# Patient Record
Sex: MALE | Employment: OTHER | URBAN - METROPOLITAN AREA
[De-identification: names, ages, dates, MRNs, and addresses within clinical notes are randomized per-mention and may not be internally consistent; named-entity substitution may affect disease eponyms.]

---

## 2021-07-30 ENCOUNTER — APPOINTMENT (OUTPATIENT)
Dept: GENERAL RADIOLOGY | Age: 61
DRG: 640 | End: 2021-07-30
Payer: COMMERCIAL

## 2021-07-30 ENCOUNTER — HOSPITAL ENCOUNTER (INPATIENT)
Age: 61
LOS: 2 days | Discharge: HOME | DRG: 640 | End: 2021-08-01
Attending: STUDENT IN AN ORGANIZED HEALTH CARE EDUCATION/TRAINING PROGRAM
Payer: COMMERCIAL

## 2021-07-30 DIAGNOSIS — E87.20 METABOLIC ACIDOSIS: ICD-10-CM

## 2021-07-30 DIAGNOSIS — Z99.2 ESRD NEEDING DIALYSIS (HCC): ICD-10-CM

## 2021-07-30 DIAGNOSIS — N18.6 ESRD NEEDING DIALYSIS (HCC): ICD-10-CM

## 2021-07-30 DIAGNOSIS — R06.00 DYSPNEA, UNSPECIFIED TYPE: ICD-10-CM

## 2021-07-30 DIAGNOSIS — E87.5 HYPERKALEMIA: Primary | ICD-10-CM

## 2021-07-30 LAB
ANION GAP SERPL CALCULATED.3IONS-SCNC: 32 MMOL/L (ref 3–16)
BASE EXCESS VENOUS: -20.1 MMOL/L (ref -2–3)
BASOPHILS ABSOLUTE: 0 K/UL (ref 0–0.2)
BASOPHILS RELATIVE PERCENT: 0.4 %
BUN BLDV-MCNC: 236 MG/DL (ref 7–20)
CALCIUM SERPL-MCNC: 7.6 MG/DL (ref 8.3–10.6)
CARBOXYHEMOGLOBIN: 1.3 % (ref 0–1.5)
CHLORIDE BLD-SCNC: 98 MMOL/L (ref 99–110)
CO2: 8 MMOL/L (ref 21–32)
CREAT SERPL-MCNC: 30.8 MG/DL (ref 0.9–1.3)
EKG ATRIAL RATE: 94 BPM
EKG DIAGNOSIS: NORMAL
EKG P AXIS: 73 DEGREES
EKG P-R INTERVAL: 182 MS
EKG Q-T INTERVAL: 414 MS
EKG QRS DURATION: 128 MS
EKG QTC CALCULATION (BAZETT): 517 MS
EKG R AXIS: 19 DEGREES
EKG T AXIS: 80 DEGREES
EKG VENTRICULAR RATE: 94 BPM
EOSINOPHILS ABSOLUTE: 0.1 K/UL (ref 0–0.6)
EOSINOPHILS RELATIVE PERCENT: 0.6 %
GFR AFRICAN AMERICAN: 2
GFR NON-AFRICAN AMERICAN: 1
GLUCOSE BLD-MCNC: 101 MG/DL (ref 70–99)
GLUCOSE BLD-MCNC: 219 MG/DL (ref 70–99)
HCO3 VENOUS: 7.7 MMOL/L (ref 24–28)
HCT VFR BLD CALC: 25.9 % (ref 40.5–52.5)
HEMOGLOBIN, VEN, REDUCED: 12.9 %
HEMOGLOBIN: 8.8 G/DL (ref 13.5–17.5)
LYMPHOCYTES ABSOLUTE: 0.5 K/UL (ref 1–5.1)
LYMPHOCYTES RELATIVE PERCENT: 5.1 %
MCH RBC QN AUTO: 31.9 PG (ref 26–34)
MCHC RBC AUTO-ENTMCNC: 33.9 G/DL (ref 31–36)
MCV RBC AUTO: 93.9 FL (ref 80–100)
METHEMOGLOBIN VENOUS: 0.5 % (ref 0–1.5)
MONOCYTES ABSOLUTE: 0.5 K/UL (ref 0–1.3)
MONOCYTES RELATIVE PERCENT: 5.2 %
NEUTROPHILS ABSOLUTE: 9.4 K/UL (ref 1.7–7.7)
NEUTROPHILS RELATIVE PERCENT: 88.7 %
O2 SAT, VEN: 87 %
PCO2, VEN: 23.8 MMHG (ref 41–51)
PDW BLD-RTO: 15.2 % (ref 12.4–15.4)
PERFORMED ON: ABNORMAL
PH VENOUS: 7.12 (ref 7.35–7.45)
PHOSPHORUS: 16.2 MG/DL (ref 2.5–4.9)
PLATELET # BLD: 365 K/UL (ref 135–450)
PMV BLD AUTO: 7.8 FL (ref 5–10.5)
PO2, VEN: 66.8 MMHG (ref 25–40)
POTASSIUM REFLEX MAGNESIUM: 7.8 MMOL/L (ref 3.5–5.1)
PRO-BNP: ABNORMAL PG/ML (ref 0–124)
RBC # BLD: 2.76 M/UL (ref 4.2–5.9)
SODIUM BLD-SCNC: 138 MMOL/L (ref 136–145)
TCO2 CALC VENOUS: 8 MMOL/L
TROPONIN: 0.08 NG/ML
WBC # BLD: 10.6 K/UL (ref 4–11)

## 2021-07-30 PROCEDURE — 90935 HEMODIALYSIS ONE EVALUATION: CPT

## 2021-07-30 PROCEDURE — 96365 THER/PROPH/DIAG IV INF INIT: CPT

## 2021-07-30 PROCEDURE — 96376 TX/PRO/DX INJ SAME DRUG ADON: CPT

## 2021-07-30 PROCEDURE — 2500000003 HC RX 250 WO HCPCS: Performed by: INTERNAL MEDICINE

## 2021-07-30 PROCEDURE — 93005 ELECTROCARDIOGRAM TRACING: CPT | Performed by: STUDENT IN AN ORGANIZED HEALTH CARE EDUCATION/TRAINING PROGRAM

## 2021-07-30 PROCEDURE — 6370000000 HC RX 637 (ALT 250 FOR IP): Performed by: STUDENT IN AN ORGANIZED HEALTH CARE EDUCATION/TRAINING PROGRAM

## 2021-07-30 PROCEDURE — 83880 ASSAY OF NATRIURETIC PEPTIDE: CPT

## 2021-07-30 PROCEDURE — 71045 X-RAY EXAM CHEST 1 VIEW: CPT

## 2021-07-30 PROCEDURE — 2580000003 HC RX 258: Performed by: STUDENT IN AN ORGANIZED HEALTH CARE EDUCATION/TRAINING PROGRAM

## 2021-07-30 PROCEDURE — 99283 EMERGENCY DEPT VISIT LOW MDM: CPT

## 2021-07-30 PROCEDURE — 96375 TX/PRO/DX INJ NEW DRUG ADDON: CPT

## 2021-07-30 PROCEDURE — 84100 ASSAY OF PHOSPHORUS: CPT

## 2021-07-30 PROCEDURE — 6360000002 HC RX W HCPCS: Performed by: STUDENT IN AN ORGANIZED HEALTH CARE EDUCATION/TRAINING PROGRAM

## 2021-07-30 PROCEDURE — 2060000000 HC ICU INTERMEDIATE R&B

## 2021-07-30 PROCEDURE — 84484 ASSAY OF TROPONIN QUANT: CPT

## 2021-07-30 PROCEDURE — 2500000003 HC RX 250 WO HCPCS: Performed by: STUDENT IN AN ORGANIZED HEALTH CARE EDUCATION/TRAINING PROGRAM

## 2021-07-30 PROCEDURE — 5A1D70Z PERFORMANCE OF URINARY FILTRATION, INTERMITTENT, LESS THAN 6 HOURS PER DAY: ICD-10-PCS | Performed by: INTERNAL MEDICINE

## 2021-07-30 PROCEDURE — 85025 COMPLETE CBC W/AUTO DIFF WBC: CPT

## 2021-07-30 PROCEDURE — 82803 BLOOD GASES ANY COMBINATION: CPT

## 2021-07-30 PROCEDURE — 36415 COLL VENOUS BLD VENIPUNCTURE: CPT

## 2021-07-30 PROCEDURE — 80048 BASIC METABOLIC PNL TOTAL CA: CPT

## 2021-07-30 RX ORDER — LABETALOL HYDROCHLORIDE 5 MG/ML
5 INJECTION, SOLUTION INTRAVENOUS EVERY 4 HOURS PRN
Status: DISCONTINUED | OUTPATIENT
Start: 2021-07-30 | End: 2021-08-01 | Stop reason: HOSPADM

## 2021-07-30 RX ORDER — DEXTROSE MONOHYDRATE 25 G/50ML
25 INJECTION, SOLUTION INTRAVENOUS PRN
Status: DISCONTINUED | OUTPATIENT
Start: 2021-07-30 | End: 2021-08-01 | Stop reason: HOSPADM

## 2021-07-30 RX ORDER — CALCIUM GLUCONATE 94 MG/ML
1000 INJECTION, SOLUTION INTRAVENOUS ONCE
Status: COMPLETED | OUTPATIENT
Start: 2021-07-30 | End: 2021-07-30

## 2021-07-30 RX ORDER — CALCIUM ACETATE 667 MG/1
1334 CAPSULE ORAL
Status: DISCONTINUED | OUTPATIENT
Start: 2021-07-30 | End: 2021-07-31

## 2021-07-30 RX ADMIN — SODIUM BICARBONATE 50 MEQ: 84 INJECTION, SOLUTION INTRAVENOUS at 15:48

## 2021-07-30 RX ADMIN — INSULIN HUMAN 5 UNITS: 100 INJECTION, SOLUTION PARENTERAL at 15:46

## 2021-07-30 RX ADMIN — LABETALOL HYDROCHLORIDE 5 MG: 5 INJECTION, SOLUTION INTRAVENOUS at 22:04

## 2021-07-30 RX ADMIN — CALCIUM GLUCONATE 1000 MG: 98 INJECTION, SOLUTION INTRAVENOUS at 14:43

## 2021-07-30 RX ADMIN — CALCIUM GLUCONATE 1000 MG: 98 INJECTION, SOLUTION INTRAVENOUS at 15:59

## 2021-07-30 ASSESSMENT — ENCOUNTER SYMPTOMS
VOMITING: 0
SHORTNESS OF BREATH: 1
BACK PAIN: 0
NAUSEA: 0
ABDOMINAL PAIN: 0
COUGH: 0
EYE DISCHARGE: 0
EYE PAIN: 0
EYE REDNESS: 0
DIARRHEA: 0

## 2021-07-30 ASSESSMENT — PAIN SCALES - GENERAL
PAINLEVEL_OUTOF10: 10
PAINLEVEL_OUTOF10: 0
PAINLEVEL_OUTOF10: 0

## 2021-07-30 ASSESSMENT — PAIN DESCRIPTION - PAIN TYPE: TYPE: ACUTE PAIN

## 2021-07-30 ASSESSMENT — PAIN DESCRIPTION - LOCATION: LOCATION: EYE

## 2021-07-30 NOTE — H&P
Hospital Medicine History & Physical      PCP: No primary care provider on file. Date of Admission: 7/30/2021    Date of Service: 7/30/2021    Pt seen/examined on 7/30/2021    Admitted to Inpatient with expected LOS greater than two midnights due to medical therapy. Chief Complaint:     Chief Complaint   Patient presents with    Shortness of Breath     had eye procedure last week, has not been able to drive and therefore has not been to HD in two weeks    Eye Pain       History Of Present Illness:      64 y.o. male who presented to [facility]. with SOB, began 2 weeks ago when he missed HD, progressively worse as he has missed more HD, persisted until presentation. He had an eye procedure done 2 weeks ago and has bee unable to drive and therefore has not been to HD since that time. Past Medical History:      Reviewed and non-contributory except as noted below      Diagnosis Date    Chronic kidney disease     Hypertension        Past Surgical History:      Reviewed and non-contributory except as noted below  No past surgical history on file. Medications Prior to Admission:      Reviewed and non-contributory except as noted below  Prior to Admission medications    Not on File       Allergies:     Reviewed and non-contributory except as noted below   Patient has no known allergies. Social History:      Reviewed and non-contributory except as noted below    TOBACCO:   reports that he has quit smoking. He has never used smokeless tobacco.  ETOH:   reports previous alcohol use. Family History:      Reviewed and non-contributory except as noted below    No family history on file. REVIEW OF SYSTEMS:   Pertinent positives and negatives as noted in the HPI. All other systems reviewed and negative.     PHYSICAL EXAM PERFORMED:    BP (!) 185/82   Pulse 92   Temp 98.9 °F (37.2 °C) (Oral)   Resp 28   Ht 5' 8\" (1.727 m)   Wt 164 lb (74.4 kg)   SpO2 98%   BMI 24.94 kg/m²     General appearance:  NAD, appears stated age  Eyes: Pupils equal, round, reactive to light, conjunctiva/corneas clear  Ears/Nose/Mouth/Throat: No external lesions or scars, hearing intact to voice  Neck: Trachea midline, no masses noted, no thyromegaly  Respiratory:  Mildly increased work of breathing, bibasilar crackles  Cardiovascular: Regular rate and rhythm, no murmurs, gallops, or rubs  Abdomen: soft, non-tender, non-distended  Musculoskeletal: Warm, 1+ pitting edema in BL lower extremities  Skin: normal color, no wounds noted  Psychiatric: A&Ox4, good insight and judgment    Labs:     Recent Labs     07/30/21  1435   WBC 10.6   HGB 8.8*   HCT 25.9*        Recent Labs     07/30/21  1435      K 7.8*   CL 98*   CO2 8*   *   CREATININE 30.8*   CALCIUM 7.6*     No results for input(s): AST, ALT, BILIDIR, BILITOT, ALKPHOS in the last 72 hours. No results for input(s): INR in the last 72 hours. Recent Labs     07/30/21  1435   TROPONINI 0.08*       Urinalysis:    No results found for: Deward Goon, BACTERIA, RBCUA, BLOODU, SPECGRAV, GLUCOSEU    Radiology:     XR CHEST PORTABLE   Final Result      Multifocal airspace opacities are noted. Recommend dedicated CT chest with intravenous contrast for further assessment. ASSESSMENT:    Active Hospital Problems    Diagnosis Date Noted    Metabolic acidosis [M87.1] 07/30/2021       PLAN:    # Metabolic acidosis  # ESRD on HD  # Hyperkalemia w/ EKG changes  -potentially life threratening  -s/p lokelma, Ca-gluconate, bicarb  -nephrology consulted for urgent HD  -tele    # HTN  -no home medications on file  -anticipate improvement w/ HD  -adjust PO regimen as needed    DVT Prophylaxis: Heparin  Diet: No diet orders on file  Code Status: No Order    PT/OT Eval Status: Ongoing    Dispo: Elizabeth Bergeron pending clinical improvement    Сергей Coffey MD    Thank you No primary care provider on file. for the opportunity to be involved in this patient's care.  If you have any questions or concerns please feel free to contact me at [phone].

## 2021-07-30 NOTE — ED PROVIDER NOTES
ED Attending Attestation Note     Date of evaluation: 7/30/2021    I supervised the care provided by the resident physician, Dr. Thu Nickerson MD. I personally interviewed and examined the patient, and I agree with the workup, evaluation, management and diagnosis. ED data including laboratory and imaging studies were reviewed by me and considered in medical decision making, and I agree with the interpretations as documented, with the following exceptions: None    In brief, Noe Grider is a 63 y.o. male who presents to the ED with Shortness of Breath (had eye procedure last week, has not been able to drive and therefore has not been to HD in two weeks) and Eye Pain  . My assessment reveals ill-appearing 79-year-old male who is tachycardic, tachypneic, confusion is present, is hypertensive. He has missed several weeks of dialysis  I have reviewed the ECG and concur with the resident's interpretation of ECG findings consistent with hyperkalemia.   Treatment given for ardiac membrane potential treatment directed  I was present for the following procedures: None    MD Cheryl Navarro MD  08/03/21 5455

## 2021-07-30 NOTE — ED NOTES
Bed: B24-24  Expected date:   Expected time:   Means of arrival:   Comments:  Jesica Del Real RN  07/30/21 5827

## 2021-07-30 NOTE — CONSULTS
Consult received. Labs and notes were reviewed. Case was discussed with the staff. Full note to follow.   Dialysis arranged for today    Thanks  Nephrology  [facility] [address]  Office: [phone]  Cell: [phone]  Fax: [phone]
Serve or cell number    Floresita Messina MD  [facility] Nephrology  Roderick Professor [address], [address]  Fax: [phone]  Office: [phone]               Reason for Consult and Chief Complaint     Reason for consult: ESRD    Chief complaint:   Chief Complaint   Patient presents with    Shortness of Breath     had eye procedure last week, has not been able to drive and therefore has not been to HD in two weeks    Eye Pain       History of Present Illness   Negrita Ybarra is a 64 y.o. with PMH significant for ESRD on HD,  HTN presents from home via EMS for evaluation of SOB. Pt has not been able to go to dialysis and unable to walk as he is unable to see. Patient also had recent eye laser surgery. Nephrology is consulted for assist in dialysis management. BP is elevated in ER. CXR in ER showed focal opacities in the right middle lower lung zones. There may be focal airspace disease in the left midlung zone as well. K is very high close to 8 and severe AGMA. Patient is uncomfortable and complaining of SOB and he is on oxygen. Patient denies chest pain, GI symptoms. No report of fever. No fall or trauma. Review of Systems     Unable to ask full ROS as patient is in distress. Past Medical History     Past Medical History:   Diagnosis Date    Chronic kidney disease     Hypertension          Past Surgical History     No past surgical history on file. Family History     No family history on file. Social History     Social History     Tobacco Use    Smoking status: Former Smoker    Smokeless tobacco: Never Used   Substance Use Topics    Alcohol use: Not Currently          Past medical, family, and social histories were reviewed as previously documented. Updates were made as necessary.       Inpatient Medications and Allergies       Scheduled Meds:    Allergies: No Known Allergies      Vital Signs     Vitals:    07/30/21 1408   BP: (!) 194/81   Pulse: 95   Resp: 15   Temp: 98.9 °F (37.2 °C)

## 2021-07-31 ENCOUNTER — APPOINTMENT (OUTPATIENT)
Dept: CT IMAGING | Age: 61
DRG: 640 | End: 2021-07-31
Payer: COMMERCIAL

## 2021-07-31 LAB
ALBUMIN SERPL-MCNC: 3.1 G/DL (ref 3.4–5)
ALBUMIN SERPL-MCNC: 3.1 G/DL (ref 3.4–5)
ALBUMIN SERPL-MCNC: 3.2 G/DL (ref 3.4–5)
ANION GAP SERPL CALCULATED.3IONS-SCNC: 18 MMOL/L (ref 3–16)
ANION GAP SERPL CALCULATED.3IONS-SCNC: 23 MMOL/L (ref 3–16)
ANION GAP SERPL CALCULATED.3IONS-SCNC: 25 MMOL/L (ref 3–16)
BASE EXCESS VENOUS: -1.5 MMOL/L (ref -2–3)
BASOPHILS ABSOLUTE: 0 K/UL (ref 0–0.2)
BASOPHILS ABSOLUTE: 0 K/UL (ref 0–0.2)
BASOPHILS RELATIVE PERCENT: 0.1 %
BASOPHILS RELATIVE PERCENT: 0.3 %
BUN BLDV-MCNC: 50 MG/DL (ref 7–20)
BUN BLDV-MCNC: 89 MG/DL (ref 7–20)
BUN BLDV-MCNC: 98 MG/DL (ref 7–20)
CALCIUM SERPL-MCNC: 7.6 MG/DL (ref 8.3–10.6)
CALCIUM SERPL-MCNC: 7.8 MG/DL (ref 8.3–10.6)
CALCIUM SERPL-MCNC: 8 MG/DL (ref 8.3–10.6)
CARBOXYHEMOGLOBIN: 1.4 % (ref 0–1.5)
CHLORIDE BLD-SCNC: 92 MMOL/L (ref 99–110)
CHLORIDE BLD-SCNC: 92 MMOL/L (ref 99–110)
CHLORIDE BLD-SCNC: 96 MMOL/L (ref 99–110)
CO2: 19 MMOL/L (ref 21–32)
CO2: 22 MMOL/L (ref 21–32)
CO2: 25 MMOL/L (ref 21–32)
CREAT SERPL-MCNC: 13.3 MG/DL (ref 0.9–1.3)
CREAT SERPL-MCNC: 14.1 MG/DL (ref 0.9–1.3)
CREAT SERPL-MCNC: 6.6 MG/DL (ref 0.9–1.3)
EKG ATRIAL RATE: 102 BPM
EKG ATRIAL RATE: 197 BPM
EKG DIAGNOSIS: NORMAL
EKG DIAGNOSIS: NORMAL
EKG P AXIS: 76 DEGREES
EKG P-R INTERVAL: 124 MS
EKG Q-T INTERVAL: 292 MS
EKG Q-T INTERVAL: 364 MS
EKG QRS DURATION: 84 MS
EKG QRS DURATION: 84 MS
EKG QTC CALCULATION (BAZETT): 474 MS
EKG QTC CALCULATION (BAZETT): 488 MS
EKG R AXIS: 38 DEGREES
EKG R AXIS: 58 DEGREES
EKG T AXIS: 169 DEGREES
EKG T AXIS: 225 DEGREES
EKG VENTRICULAR RATE: 102 BPM
EKG VENTRICULAR RATE: 168 BPM
EOSINOPHILS ABSOLUTE: 0 K/UL (ref 0–0.6)
EOSINOPHILS ABSOLUTE: 0 K/UL (ref 0–0.6)
EOSINOPHILS RELATIVE PERCENT: 0 %
EOSINOPHILS RELATIVE PERCENT: 0.1 %
FERRITIN: 893.4 NG/ML (ref 30–400)
GFR AFRICAN AMERICAN: 10
GFR AFRICAN AMERICAN: 4
GFR AFRICAN AMERICAN: 5
GFR NON-AFRICAN AMERICAN: 4
GFR NON-AFRICAN AMERICAN: 4
GFR NON-AFRICAN AMERICAN: 9
GLUCOSE BLD-MCNC: 115 MG/DL (ref 70–99)
GLUCOSE BLD-MCNC: 128 MG/DL (ref 70–99)
GLUCOSE BLD-MCNC: 167 MG/DL (ref 70–99)
GLUCOSE BLD-MCNC: 176 MG/DL (ref 70–99)
HCO3 VENOUS: 23.1 MMOL/L (ref 24–28)
HCT VFR BLD CALC: 20.4 % (ref 40.5–52.5)
HCT VFR BLD CALC: 23.4 % (ref 40.5–52.5)
HEMOGLOBIN, VEN, REDUCED: 31.7 %
HEMOGLOBIN: 7.3 G/DL (ref 13.5–17.5)
HEMOGLOBIN: 8.1 G/DL (ref 13.5–17.5)
IRON SATURATION: 56 % (ref 20–50)
IRON: 74 UG/DL (ref 59–158)
LACTIC ACID: 1.3 MMOL/L (ref 0.4–2)
LACTIC ACID: 3.3 MMOL/L (ref 0.4–2)
LYMPHOCYTES ABSOLUTE: 0.5 K/UL (ref 1–5.1)
LYMPHOCYTES ABSOLUTE: 0.5 K/UL (ref 1–5.1)
LYMPHOCYTES RELATIVE PERCENT: 3.5 %
LYMPHOCYTES RELATIVE PERCENT: 5 %
MAGNESIUM: 1.8 MG/DL (ref 1.8–2.4)
MCH RBC QN AUTO: 31.2 PG (ref 26–34)
MCH RBC QN AUTO: 32.5 PG (ref 26–34)
MCHC RBC AUTO-ENTMCNC: 34.6 G/DL (ref 31–36)
MCHC RBC AUTO-ENTMCNC: 35.9 G/DL (ref 31–36)
MCV RBC AUTO: 90.2 FL (ref 80–100)
MCV RBC AUTO: 90.5 FL (ref 80–100)
METHEMOGLOBIN VENOUS: 0.5 % (ref 0–1.5)
MONOCYTES ABSOLUTE: 0.7 K/UL (ref 0–1.3)
MONOCYTES ABSOLUTE: 1 K/UL (ref 0–1.3)
MONOCYTES RELATIVE PERCENT: 10.1 %
MONOCYTES RELATIVE PERCENT: 5.2 %
NEUTROPHILS ABSOLUTE: 12.1 K/UL (ref 1.7–7.7)
NEUTROPHILS ABSOLUTE: 8.3 K/UL (ref 1.7–7.7)
NEUTROPHILS RELATIVE PERCENT: 84.5 %
NEUTROPHILS RELATIVE PERCENT: 91.2 %
O2 SAT, VEN: 68 %
PARATHYROID HORMONE INTACT: 357.5 PG/ML (ref 14–72)
PCO2, VEN: 37.1 MMHG (ref 41–51)
PDW BLD-RTO: 15 % (ref 12.4–15.4)
PDW BLD-RTO: 15.2 % (ref 12.4–15.4)
PERFORMED ON: ABNORMAL
PH VENOUS: 7.4 (ref 7.35–7.45)
PHOSPHORUS: 4.6 MG/DL (ref 2.5–4.9)
PHOSPHORUS: 8.3 MG/DL (ref 2.5–4.9)
PHOSPHORUS: 9.4 MG/DL (ref 2.5–4.9)
PLATELET # BLD: 280 K/UL (ref 135–450)
PLATELET # BLD: 352 K/UL (ref 135–450)
PMV BLD AUTO: 7.3 FL (ref 5–10.5)
PMV BLD AUTO: 8 FL (ref 5–10.5)
PO2, VEN: 37.7 MMHG (ref 25–40)
POTASSIUM SERPL-SCNC: 4.6 MMOL/L (ref 3.5–5.1)
POTASSIUM SERPL-SCNC: 4.6 MMOL/L (ref 3.5–5.1)
POTASSIUM SERPL-SCNC: 4.7 MMOL/L (ref 3.5–5.1)
PROCALCITONIN: 2.71 NG/ML (ref 0–0.15)
RBC # BLD: 2.25 M/UL (ref 4.2–5.9)
RBC # BLD: 2.59 M/UL (ref 4.2–5.9)
SODIUM BLD-SCNC: 136 MMOL/L (ref 136–145)
SODIUM BLD-SCNC: 137 MMOL/L (ref 136–145)
SODIUM BLD-SCNC: 139 MMOL/L (ref 136–145)
TCO2 CALC VENOUS: 24 MMOL/L
TOTAL IRON BINDING CAPACITY: 131 UG/DL (ref 260–445)
TROPONIN: 0.11 NG/ML
VITAMIN D 25-HYDROXY: 41.7 NG/ML
WBC # BLD: 13.2 K/UL (ref 4–11)
WBC # BLD: 9.8 K/UL (ref 4–11)

## 2021-07-31 PROCEDURE — 2580000003 HC RX 258: Performed by: STUDENT IN AN ORGANIZED HEALTH CARE EDUCATION/TRAINING PROGRAM

## 2021-07-31 PROCEDURE — 84484 ASSAY OF TROPONIN QUANT: CPT

## 2021-07-31 PROCEDURE — 36415 COLL VENOUS BLD VENIPUNCTURE: CPT

## 2021-07-31 PROCEDURE — 84145 PROCALCITONIN (PCT): CPT

## 2021-07-31 PROCEDURE — 90935 HEMODIALYSIS ONE EVALUATION: CPT

## 2021-07-31 PROCEDURE — 2500000003 HC RX 250 WO HCPCS: Performed by: STUDENT IN AN ORGANIZED HEALTH CARE EDUCATION/TRAINING PROGRAM

## 2021-07-31 PROCEDURE — 83970 ASSAY OF PARATHORMONE: CPT

## 2021-07-31 PROCEDURE — 93010 ELECTROCARDIOGRAM REPORT: CPT | Performed by: INTERNAL MEDICINE

## 2021-07-31 PROCEDURE — 85025 COMPLETE CBC W/AUTO DIFF WBC: CPT

## 2021-07-31 PROCEDURE — 6360000002 HC RX W HCPCS: Performed by: INTERNAL MEDICINE

## 2021-07-31 PROCEDURE — 2500000003 HC RX 250 WO HCPCS: Performed by: INTERNAL MEDICINE

## 2021-07-31 PROCEDURE — 70450 CT HEAD/BRAIN W/O DYE: CPT

## 2021-07-31 PROCEDURE — 82803 BLOOD GASES ANY COMBINATION: CPT

## 2021-07-31 PROCEDURE — 6370000000 HC RX 637 (ALT 250 FOR IP): Performed by: INTERNAL MEDICINE

## 2021-07-31 PROCEDURE — 83605 ASSAY OF LACTIC ACID: CPT

## 2021-07-31 PROCEDURE — 83550 IRON BINDING TEST: CPT

## 2021-07-31 PROCEDURE — 71260 CT THORAX DX C+: CPT

## 2021-07-31 PROCEDURE — 2580000003 HC RX 258: Performed by: INTERNAL MEDICINE

## 2021-07-31 PROCEDURE — 2060000000 HC ICU INTERMEDIATE R&B

## 2021-07-31 PROCEDURE — 6360000002 HC RX W HCPCS: Performed by: STUDENT IN AN ORGANIZED HEALTH CARE EDUCATION/TRAINING PROGRAM

## 2021-07-31 PROCEDURE — 82728 ASSAY OF FERRITIN: CPT

## 2021-07-31 PROCEDURE — 80069 RENAL FUNCTION PANEL: CPT

## 2021-07-31 PROCEDURE — 83735 ASSAY OF MAGNESIUM: CPT

## 2021-07-31 PROCEDURE — 82306 VITAMIN D 25 HYDROXY: CPT

## 2021-07-31 PROCEDURE — 93005 ELECTROCARDIOGRAM TRACING: CPT | Performed by: STUDENT IN AN ORGANIZED HEALTH CARE EDUCATION/TRAINING PROGRAM

## 2021-07-31 PROCEDURE — 83540 ASSAY OF IRON: CPT

## 2021-07-31 PROCEDURE — 93005 ELECTROCARDIOGRAM TRACING: CPT | Performed by: INTERNAL MEDICINE

## 2021-07-31 PROCEDURE — 6360000004 HC RX CONTRAST MEDICATION: Performed by: INTERNAL MEDICINE

## 2021-07-31 RX ORDER — ONDANSETRON 2 MG/ML
4 INJECTION INTRAMUSCULAR; INTRAVENOUS EVERY 6 HOURS PRN
Status: DISCONTINUED | OUTPATIENT
Start: 2021-07-31 | End: 2021-08-01 | Stop reason: HOSPADM

## 2021-07-31 RX ORDER — SODIUM CHLORIDE 0.9 % (FLUSH) 0.9 %
10 SYRINGE (ML) INJECTION PRN
Status: DISCONTINUED | OUTPATIENT
Start: 2021-07-31 | End: 2021-08-01 | Stop reason: HOSPADM

## 2021-07-31 RX ORDER — SODIUM CHLORIDE 9 MG/ML
25 INJECTION, SOLUTION INTRAVENOUS PRN
Status: DISCONTINUED | OUTPATIENT
Start: 2021-07-31 | End: 2021-08-01 | Stop reason: HOSPADM

## 2021-07-31 RX ORDER — ACETAMINOPHEN 325 MG/1
650 TABLET ORAL EVERY 6 HOURS PRN
Status: DISCONTINUED | OUTPATIENT
Start: 2021-07-31 | End: 2021-08-01 | Stop reason: HOSPADM

## 2021-07-31 RX ORDER — METOPROLOL TARTRATE 5 MG/5ML
5 INJECTION INTRAVENOUS ONCE
Status: COMPLETED | OUTPATIENT
Start: 2021-07-31 | End: 2021-07-31

## 2021-07-31 RX ORDER — MORPHINE SULFATE 2 MG/ML
1 INJECTION, SOLUTION INTRAMUSCULAR; INTRAVENOUS ONCE
Status: COMPLETED | OUTPATIENT
Start: 2021-07-31 | End: 2021-07-31

## 2021-07-31 RX ORDER — LABETALOL HYDROCHLORIDE 5 MG/ML
10 INJECTION, SOLUTION INTRAVENOUS ONCE
Status: COMPLETED | OUTPATIENT
Start: 2021-07-31 | End: 2021-07-31

## 2021-07-31 RX ORDER — SODIUM CHLORIDE 0.9 % (FLUSH) 0.9 %
10 SYRINGE (ML) INJECTION EVERY 12 HOURS SCHEDULED
Status: DISCONTINUED | OUTPATIENT
Start: 2021-07-31 | End: 2021-08-01 | Stop reason: HOSPADM

## 2021-07-31 RX ORDER — AMLODIPINE BESYLATE 5 MG/1
5 TABLET ORAL DAILY
Status: DISCONTINUED | OUTPATIENT
Start: 2021-07-31 | End: 2021-08-01 | Stop reason: HOSPADM

## 2021-07-31 RX ORDER — ACETAMINOPHEN 650 MG/1
650 SUPPOSITORY RECTAL EVERY 6 HOURS PRN
Status: DISCONTINUED | OUTPATIENT
Start: 2021-07-31 | End: 2021-08-01 | Stop reason: HOSPADM

## 2021-07-31 RX ORDER — HEPARIN SODIUM 5000 [USP'U]/ML
5000 INJECTION, SOLUTION INTRAVENOUS; SUBCUTANEOUS EVERY 8 HOURS SCHEDULED
Status: DISCONTINUED | OUTPATIENT
Start: 2021-07-31 | End: 2021-08-01 | Stop reason: HOSPADM

## 2021-07-31 RX ORDER — SEVELAMER CARBONATE 800 MG/1
1600 TABLET, FILM COATED ORAL
Status: DISCONTINUED | OUTPATIENT
Start: 2021-07-31 | End: 2021-08-01 | Stop reason: HOSPADM

## 2021-07-31 RX ORDER — SENNA AND DOCUSATE SODIUM 50; 8.6 MG/1; MG/1
1 TABLET, FILM COATED ORAL 2 TIMES DAILY
Status: DISCONTINUED | OUTPATIENT
Start: 2021-07-31 | End: 2021-08-01 | Stop reason: HOSPADM

## 2021-07-31 RX ORDER — ONDANSETRON 4 MG/1
4 TABLET, ORALLY DISINTEGRATING ORAL EVERY 8 HOURS PRN
Status: DISCONTINUED | OUTPATIENT
Start: 2021-07-31 | End: 2021-08-01 | Stop reason: HOSPADM

## 2021-07-31 RX ADMIN — IOPAMIDOL 80 ML: 755 INJECTION, SOLUTION INTRAVENOUS at 16:19

## 2021-07-31 RX ADMIN — METOPROLOL TARTRATE 2.5 MG: 5 INJECTION INTRAVENOUS at 12:33

## 2021-07-31 RX ADMIN — HEPARIN SODIUM 5000 UNITS: 5000 INJECTION INTRAVENOUS; SUBCUTANEOUS at 23:13

## 2021-07-31 RX ADMIN — ONDANSETRON 4 MG: 2 INJECTION INTRAMUSCULAR; INTRAVENOUS at 09:43

## 2021-07-31 RX ADMIN — CALCIUM ACETATE 1334 MG: 667 CAPSULE ORAL at 00:07

## 2021-07-31 RX ADMIN — DOCUSATE SODIUM 50 MG AND SENNOSIDES 8.6 MG 1 TABLET: 8.6; 5 TABLET, FILM COATED ORAL at 13:49

## 2021-07-31 RX ADMIN — LABETALOL HYDROCHLORIDE 10 MG: 5 INJECTION INTRAVENOUS at 01:43

## 2021-07-31 RX ADMIN — Medication 10 ML: at 22:11

## 2021-07-31 RX ADMIN — AMIODARONE HYDROCHLORIDE 150 MG: 50 INJECTION, SOLUTION INTRAVENOUS at 13:00

## 2021-07-31 RX ADMIN — Medication 10 ML: at 09:25

## 2021-07-31 RX ADMIN — SEVELAMER CARBONATE 1600 MG: 800 TABLET, FILM COATED ORAL at 13:49

## 2021-07-31 RX ADMIN — HEPARIN SODIUM 5000 UNITS: 5000 INJECTION INTRAVENOUS; SUBCUTANEOUS at 13:39

## 2021-07-31 RX ADMIN — ONDANSETRON 4 MG: 2 INJECTION INTRAMUSCULAR; INTRAVENOUS at 12:54

## 2021-07-31 RX ADMIN — ONDANSETRON 4 MG: 4 TABLET, ORALLY DISINTEGRATING ORAL at 23:21

## 2021-07-31 RX ADMIN — SEVELAMER CARBONATE 1600 MG: 800 TABLET, FILM COATED ORAL at 21:06

## 2021-07-31 RX ADMIN — AZITHROMYCIN MONOHYDRATE 500 MG: 500 INJECTION, POWDER, LYOPHILIZED, FOR SOLUTION INTRAVENOUS at 15:01

## 2021-07-31 RX ADMIN — CEFTRIAXONE 1000 MG: 1 INJECTION, POWDER, FOR SOLUTION INTRAMUSCULAR; INTRAVENOUS at 13:35

## 2021-07-31 RX ADMIN — MORPHINE SULFATE 1 MG: 2 INJECTION, SOLUTION INTRAMUSCULAR; INTRAVENOUS at 01:42

## 2021-07-31 ASSESSMENT — PAIN SCALES - GENERAL
PAINLEVEL_OUTOF10: 0
PAINLEVEL_OUTOF10: 5
PAINLEVEL_OUTOF10: 0

## 2021-07-31 NOTE — PROGRESS NOTES
NUTRITION NOTE   Admission Date: 7/30/2021     Type and Reason for Visit: Positive Nutrition Screen    NUTRITION RECOMMENDATIONS:   1. PO Diet: Recommend adding low Phosphorus modifier on regular diet. 2. ONS: Adding Nepro BID   3. Record and encourage PO intake at all meals through admission. NUTRITION ASSESSMENT:  RD assessment triggered for Malnutrition Screening Tool Score indicating appetite decrease and N/V. Unable to see pt receiving dialysis at the time. No weight history per EMR. Noted elevated phos level, thus RD adding phosphorus restriction on current diet. RD adding ONS BID to aid with meeting elevated nutritional needs while on dialysis. The patient will still be monitored per nutrition standards of care. Consult dietitian if nutrition interventions essential to patient care is needed. Patient admitted d/t Metabolic acidosis    PMH significant for: ESRD on HD, HTN    MALNUTRITION ASSESSMENT  Context of Malnutrition: Acute Illness (on chronic)   Malnutrition Status: Insufficient data    NUTRITION DIAGNOSIS  Problem: Problem #1: Increased nutrient needs  Etiology: Increased demand for nutrient  Signs & Symptoms: Known losses with dialysis    NUTRITION MONITORING & EVALUATION:  Evaluation:Goals set   Goals: Pt to meet >50% of nutritional requirements from diet and ONS   Monitoring: Meal Intake , Nausea , Pertinent Labs , Supplement Intake  or Vomiting      ANTHROPOMETRICS  Current Height: 5' 8\" (172.7 cm)  Current Weight: 156 lb 15.5 oz (71.2 kg)    Admission weight: 164 lb (74.4 kg)  Ideal Body Weight (lbs) (Calculated): 154 lbs (Ideal Body Weight (Kg) (Calculated): 70 kg)  Usual Bodyweight ADRIÁN   Weight Changes ADRIÁN       BMI BMI (Calculated): 23.9    Wt Readings from Last 50 Encounters:   07/31/21 156 lb 15.5 oz (71.2 kg)         CURRENT NUTRITION THERAPIES  ADULT DIET; Regular; Low Phosphorus (Less than 1000 mg)  Adult Oral Nutrition Supplement;  Renal Oral Supplement     PO Intake: ADRIÁN- new admission  PO Supplement Intake: None   IVF: none       Bunny Kraus, [address] [place] [address],   [place]:  [phone]  Office:  [phone]

## 2021-07-31 NOTE — PROGRESS NOTES
Nephrology Consult Note        [address]    [facility]       Phone: [phone]                                                  7/31/2021 8:23 AM     Patient: Noe Grider [id number]  [id number]  Date of Admit: 7/30/2021 LOS: 1 days  Referring physician:      Assessment & Plan     Renal function:  ESRD  On iHD  Patient is non complaint   Patient is complaining of SOB. No chest pain. Electrolytes:  Severe hyperkalemia. Lab Results   Component Value Date    CREATININE 13.3 (HH) 07/31/2021    BUN 89 (HH) 07/31/2021     07/31/2021    K 4.7 07/31/2021    CL 92 (L) 07/31/2021    CO2 22 07/31/2021            # Renal Osteodystrophy:   Lab Results   Component Value Date    .5 (H) 07/31/2021    CALCIUM 8.0 (L) 07/31/2021    PHOS 9.4 (H) 07/31/2021         Acid/Base status:  AGMA. Severe due to non compliance with HD. Volume status/BP:  Patient is hypertensive. Hypervolemic      Hematology:   Lab Results   Component Value Date    FERRITIN 893.4 (H) 07/31/2021     Lab Results   Component Value Date    WBC 13.2 (H) 07/31/2021    HGB 8.1 (L) 07/31/2021    HCT 23.4 (L) 07/31/2021    MCV 90.2 07/31/2021     07/31/2021     CKD related anemia: Hb is low. Goal Hgb 10-11.5 g/dl.   Transfusion goal per primary team.         Plan:    SP HD yesterday with 2.5 liters removal.    Potassium improved    Start Binders and add calcitriol once PO4 under control  Ferritin is high    HD again today      Avoid nephrotoxins  Monitor input, output and daily weight  Renally dose all medications        Thank you for allowing us to participate in this patient's care    In case of any question please call us at our 24 hour answering service [phone] or from 7 AM to 5 PM via [employer] or cell number    Betito Andrea MD  [address] Neph[facility]  [facility]  [place], [address]  Fax: [phone]  Office: [phone]               Reason for Consult and Chief Complaint Reason for consult: ESRD    Chief complaint:   Chief Complaint   Patient presents with    Shortness of Breath     had eye procedure last week, has not been able to drive and therefore has not been to HD in two weeks    Eye Pain       History of Present Illness   Jimenez Alvarenga is a 64 y.o. with PMH significant for ESRD on HD,  HTN presents from home via EMS for evaluation of SOB. Pt has not been able to go to dialysis and unable to walk as he is unable to see. Patient also had recent eye laser surgery. Nephrology is consulted for assist in dialysis management. BP is elevated in ER. CXR in ER showed focal opacities in the right middle lower lung zones. There may be focal airspace disease in the left midlung zone as well. K is very high close to 8 and severe AGMA. Patient is uncomfortable and complaining of SOB and he is on oxygen. Patient denies chest pain, GI symptoms. No report of fever. No fall or trauma. Review of Systems     Unable to ask full ROS as patient is in distress. Past Medical History     Past Medical History:   Diagnosis Date    Chronic kidney disease     Hypertension          Past Surgical History     No past surgical history on file. Family History     No family history on file. Social History     Social History     Tobacco Use    Smoking status: Former Smoker    Smokeless tobacco: Never Used   Substance Use Topics    Alcohol use: Not Currently          Past medical, family, and social histories were reviewed as previously documented. Updates were made as necessary. Inpatient Medications and Allergies       Scheduled Meds:    Allergies: No Known Allergies      Vital Signs     Vitals:    07/31/21 0302   BP: (!) 162/78   Pulse: 93   Resp: 22   Temp: 98.5 °F (36.9 °C)   SpO2: 96%       No intake or output data in the 24 hours ending 07/31/21 4252      Physical Exam     General appearance: Patient is in respiratory distress.    Head: Normocephalic, without obvious abnormality, atraumatic   Mouth: Moist mucous membrane  Neck: Supple. Lungs: Patient is on oxygen and in respiratory distress. No wheezing. Heart: S1, S2. No tachycardia. BP elevated. Abdomen: soft, non-tender non-distended  Extremities: No leg edema, warm to touch   Skin: No concerning rashes noted  Psych: good eye contact, normal affect  Neuro: AAO x 3. Moving all extremities. AVF: Good thrill. No signs of infection.        Laboratory Data     Please see above     Diagnostic Studies   Pertinent images reviewed

## 2021-07-31 NOTE — SIGNIFICANT EVENT
Patient HR elevated while in dialysis sustaining in the 170's. This nurse and the charge nurse arrived to the unit to assess the patient. He was asymptomatic. A rapid was called due to this being new onset afib with RVR. The rapid response team arrived along with the ICU resident team. Dialysis treatment was stopped and patient was treated with a bolus, 2.5 mg of lopressor and attempted to start an amiodraone IV bolus which caused his HR to drop to the 40s. Amiodarone was stopped and patient was stabilized then returned to the unit. Will continue to monitor patient closely.

## 2021-07-31 NOTE — PROGRESS NOTES
Pt admitted from dialysis    4 Eyes Admission Assessment     I agree as the admission nurse that 2 RN's have performed a thorough Head to Toe Skin Assessment on the patient. ALL assessment sites listed below have been assessed on admission. Areas assessed by both nurses: ***  [x]   Head, Face, and Ears   [x]   Shoulders, Back, and Chest  [x]   Arms, Elbows, and Hands- Bruising R arm; fistula L FA  [x]   Coccyx, Sacrum, and Ischium Redness   [x]   Legs, Feet, and Heel        Does the Patient have Skin Breakdown?          Cezar Prevention initiated:  Yes   Wound Care Orders initiated:  No      WOC nurse consulted for Pressure Injury (Stage 3,4, Unstageable, DTI, NWPT, and Complex wounds) or Cezar score 18 or lower:  No      Nurse 1 eSignature: Electronically signed by Doe Bruce RN on 7/31/21 at 2:42 AM EDT    **SHARE this note so that the co-signing nurse is able to place an eSignature**    Nurse 2 eSignature: {Esignature:[id number]}

## 2021-07-31 NOTE — PLAN OF CARE
Problem: Nutrition  Goal: Optimal nutrition therapy  Outcome: Ongoing   Nutrition Problem #1: Increased nutrient needs  Intervention: Food and/or Nutrient Delivery: Continue Current Diet, Start Oral Nutrition Supplement  Nutritional Goals: Pt to meet >50% of nutritional requirements from diet and ONS

## 2021-07-31 NOTE — PROGRESS NOTES
Treatment time: 4 hours  Net UF: 2500 ml     Pre weight: 75.4 kg  Post weight: 73 kg  EDW: 76.5 kg (challenging)  Crit Line Used: Yes Ending Profile: C  Refill Present: No     07/30/21 1645 07/30/21 2100   Vital Signs   BP (!) 199/96 (!) 141/70   Temp 97.4 °F (36.3 °C) 97.7 °F (36.5 °C)   Pulse 88 96   Resp 20 18   SpO2 98 % 98 %   Height 5' 8\" (1.727 m) 5' 8\" (1.727 m)   Weight 166 lb 3.6 oz (75.4 kg) 160 lb 15 oz (73 kg)   Weight Method Bed scale Bed scale   Percent Weight Change 1.36 -3.18   Dry Weight 168 lb 10.4 oz (76.5 kg) 168 lb 10.4 oz (76.5 kg)     Access used: L AVF    Access function: Well with  ml/min     Medications or blood products given: n/a     Regular outpatient schedule: ANABELA Perez     Summary of response to treatment: Patient tolerated treatment fair and without any complications. Patient stable upon exiting the dialysis suite on [facility] via RN transport. Report given to Hill Boo RN and copy of dialysis treatment record placed in chart, to be scanned into EMR.

## 2021-07-31 NOTE — FLOWSHEET NOTE
Treatment time: 2 hours 11min  Net UF: -500 ml     Pre weight: 71.2 kg (standing)  Post weight: 71.7 kg (estimated, ADRIÁN due to patient becoming unstable)  EDW: 76.5 kg (was being challenged)     Access used: LFA AVF  Access function: Good with  ml/min     Medications or blood products given: None     Regular outpatient schedule: ANABELA Masterson     Summary of response to treatment: Tx ended early because floor nurse came to bedside and said patient has heart rate of 160. Patient remains responsive. States he feels fine. Blood rinsed back with 850ml. Patient remains confused about time. Additional 500ml NSS bolus given by HD nurse and labs collected before AVF needles removed per order of rapid response MD. Patient's HR did decrease to 94bpm for a few minutes after tx ended and blood was returned. However, HR increased back to 160's shortly after. RR team gave bolus of Metoprolol and then patient became hypotensive 84/36, but still remained responsive and talking to staff. Patient was transferred back to his room by floor staff once he was stabilized.  Dr. Julee Rosas notified patient had rapid response in HD and tx was cut short.        Copy of dialysis treatment record placed in chart, to be scanned into EMR.       07/31/21 0950 07/31/21 1215   Treatment   Time On 0959  --    Time Off  --  1210   Vital Signs   /68 134/69   Temp 98.5 °F (36.9 °C) 97.8 °F (36.6 °C)   Pulse 98 94   Resp 18 18   Dialysis Bath   K+ (Potassium) 3  --    Ca+ (Calcium) 2.5  --    Na+ (Sodium) 140  --    HCO3 (Bicarb) 40  --

## 2021-07-31 NOTE — SIGNIFICANT EVENT
The ICU resident team on call was called to a rapid response in the dialysis suite for this patient. He was noted to have a HR in the 170's. On arrival he was found to be asymptomatic, without chest pain, shortness of breath or palpitations. He was shortly found to be in Afib with RVR. At that point, Lopressor 2.5mg IV was ordered and given. This caused the patient to have an episode off hypotension to the 81'R-04'F systolic. Amiodarone IV bolus was ordered and started for the patient, which caused the patient's heart rate to slow to the 40's, at which time the bolus amiodarone was discontinued.      Signed,    Mercedez Chauhan MD, PGY-1  Internal Medicine Resident

## 2021-08-01 VITALS
RESPIRATION RATE: 18 BRPM | SYSTOLIC BLOOD PRESSURE: 168 MMHG | HEART RATE: 94 BPM | WEIGHT: 158.07 LBS | DIASTOLIC BLOOD PRESSURE: 83 MMHG | BODY MASS INDEX: 23.96 KG/M2 | OXYGEN SATURATION: 95 % | HEIGHT: 68 IN | TEMPERATURE: 99 F

## 2021-08-01 LAB
ANION GAP SERPL CALCULATED.3IONS-SCNC: 16 MMOL/L (ref 3–16)
BUN BLDV-MCNC: 68 MG/DL (ref 7–20)
CALCIUM SERPL-MCNC: 7.6 MG/DL (ref 8.3–10.6)
CHLORIDE BLD-SCNC: 96 MMOL/L (ref 99–110)
CO2: 28 MMOL/L (ref 21–32)
CREAT SERPL-MCNC: 9.2 MG/DL (ref 0.9–1.3)
EKG ATRIAL RATE: 87 BPM
EKG ATRIAL RATE: 87 BPM
EKG DIAGNOSIS: NORMAL
EKG DIAGNOSIS: NORMAL
EKG P AXIS: 66 DEGREES
EKG P AXIS: 69 DEGREES
EKG P-R INTERVAL: 128 MS
EKG P-R INTERVAL: 140 MS
EKG Q-T INTERVAL: 526 MS
EKG Q-T INTERVAL: 536 MS
EKG QRS DURATION: 88 MS
EKG QRS DURATION: 90 MS
EKG QTC CALCULATION (BAZETT): 578 MS
EKG QTC CALCULATION (BAZETT): 632 MS
EKG R AXIS: 44 DEGREES
EKG R AXIS: 44 DEGREES
EKG T AXIS: 73 DEGREES
EKG T AXIS: 75 DEGREES
EKG VENTRICULAR RATE: 70 BPM
EKG VENTRICULAR RATE: 87 BPM
GFR AFRICAN AMERICAN: 7
GFR NON-AFRICAN AMERICAN: 6
GLUCOSE BLD-MCNC: 191 MG/DL (ref 70–99)
GLUCOSE BLD-MCNC: 232 MG/DL (ref 70–99)
GLUCOSE BLD-MCNC: 280 MG/DL (ref 70–99)
HCT VFR BLD CALC: 20.7 % (ref 40.5–52.5)
HEMOGLOBIN: 7.2 G/DL (ref 13.5–17.5)
MCH RBC QN AUTO: 32.3 PG (ref 26–34)
MCHC RBC AUTO-ENTMCNC: 34.9 G/DL (ref 31–36)
MCV RBC AUTO: 92.4 FL (ref 80–100)
PDW BLD-RTO: 15.1 % (ref 12.4–15.4)
PERFORMED ON: ABNORMAL
PERFORMED ON: ABNORMAL
PLATELET # BLD: 297 K/UL (ref 135–450)
PMV BLD AUTO: 8.1 FL (ref 5–10.5)
POTASSIUM REFLEX MAGNESIUM: 4.7 MMOL/L (ref 3.5–5.1)
RBC # BLD: 2.24 M/UL (ref 4.2–5.9)
SODIUM BLD-SCNC: 140 MMOL/L (ref 136–145)
WBC # BLD: 10.5 K/UL (ref 4–11)

## 2021-08-01 PROCEDURE — 36415 COLL VENOUS BLD VENIPUNCTURE: CPT

## 2021-08-01 PROCEDURE — 2500000003 HC RX 250 WO HCPCS: Performed by: INTERNAL MEDICINE

## 2021-08-01 PROCEDURE — 2580000003 HC RX 258: Performed by: INTERNAL MEDICINE

## 2021-08-01 PROCEDURE — 6360000002 HC RX W HCPCS: Performed by: INTERNAL MEDICINE

## 2021-08-01 PROCEDURE — 80048 BASIC METABOLIC PNL TOTAL CA: CPT

## 2021-08-01 PROCEDURE — 6370000000 HC RX 637 (ALT 250 FOR IP): Performed by: INTERNAL MEDICINE

## 2021-08-01 PROCEDURE — 85027 COMPLETE CBC AUTOMATED: CPT

## 2021-08-01 RX ORDER — METOPROLOL SUCCINATE 25 MG/1
25 TABLET, EXTENDED RELEASE ORAL DAILY
Status: DISCONTINUED | OUTPATIENT
Start: 2021-08-01 | End: 2021-08-01 | Stop reason: HOSPADM

## 2021-08-01 RX ORDER — AMLODIPINE BESYLATE 5 MG/1
5 TABLET ORAL DAILY
Qty: 30 TABLET | Refills: 3 | Status: ON HOLD | OUTPATIENT
Start: 2021-08-02 | End: 2021-08-04 | Stop reason: HOSPADM

## 2021-08-01 RX ORDER — METOPROLOL SUCCINATE 25 MG/1
25 TABLET, EXTENDED RELEASE ORAL DAILY
Qty: 30 TABLET | Refills: 3 | Status: ON HOLD | OUTPATIENT
Start: 2021-08-02 | End: 2021-08-04 | Stop reason: HOSPADM

## 2021-08-01 RX ORDER — INSULIN GLARGINE 100 [IU]/ML
10 INJECTION, SOLUTION SUBCUTANEOUS NIGHTLY
COMMUNITY

## 2021-08-01 RX ORDER — AZITHROMYCIN 500 MG/1
500 TABLET, FILM COATED ORAL DAILY
Qty: 3 TABLET | Refills: 0 | Status: ON HOLD | OUTPATIENT
Start: 2021-08-01 | End: 2021-08-04 | Stop reason: HOSPADM

## 2021-08-01 RX ORDER — CEFDINIR 300 MG/1
300 CAPSULE ORAL 2 TIMES DAILY
Qty: 10 CAPSULE | Refills: 0 | Status: SHIPPED | OUTPATIENT
Start: 2021-08-01 | End: 2021-08-06

## 2021-08-01 RX ORDER — SEVELAMER CARBONATE 800 MG/1
1600 TABLET, FILM COATED ORAL
Qty: 90 TABLET | Refills: 3 | Status: ON HOLD | OUTPATIENT
Start: 2021-08-01 | End: 2022-10-07 | Stop reason: SDUPTHER

## 2021-08-01 RX ADMIN — AMLODIPINE BESYLATE 5 MG: 5 TABLET ORAL at 12:29

## 2021-08-01 RX ADMIN — HEPARIN SODIUM 5000 UNITS: 5000 INJECTION INTRAVENOUS; SUBCUTANEOUS at 05:59

## 2021-08-01 RX ADMIN — AZITHROMYCIN MONOHYDRATE 500 MG: 500 INJECTION, POWDER, LYOPHILIZED, FOR SOLUTION INTRAVENOUS at 12:30

## 2021-08-01 RX ADMIN — SEVELAMER CARBONATE 1600 MG: 800 TABLET, FILM COATED ORAL at 12:29

## 2021-08-01 RX ADMIN — METOPROLOL SUCCINATE 25 MG: 25 TABLET, FILM COATED, EXTENDED RELEASE ORAL at 12:29

## 2021-08-01 RX ADMIN — LABETALOL HYDROCHLORIDE 5 MG: 5 INJECTION, SOLUTION INTRAVENOUS at 08:27

## 2021-08-01 RX ADMIN — DOCUSATE SODIUM 50 MG AND SENNOSIDES 8.6 MG 1 TABLET: 8.6; 5 TABLET, FILM COATED ORAL at 08:27

## 2021-08-01 RX ADMIN — Medication 10 ML: at 08:28

## 2021-08-01 RX ADMIN — CEFTRIAXONE 1000 MG: 1 INJECTION, POWDER, FOR SOLUTION INTRAMUSCULAR; INTRAVENOUS at 12:30

## 2021-08-01 RX ADMIN — SEVELAMER CARBONATE 1600 MG: 800 TABLET, FILM COATED ORAL at 08:27

## 2021-08-01 RX ADMIN — HEPARIN SODIUM 5000 UNITS: 5000 INJECTION INTRAVENOUS; SUBCUTANEOUS at 14:49

## 2021-08-01 ASSESSMENT — PAIN SCALES - GENERAL
PAINLEVEL_OUTOF10: 0

## 2021-08-01 NOTE — CARE COORDINATION
Case Management Assessment            Discharge Note                    Date / Time of Note: 8/1/2021 4:32 PM                  Discharge Note Completed by: Sidney Roa RN    Patient Name: Fabiola Jimenez   YOB: 1960  Diagnosis: Metabolic acidosis [T46.4]   Date / Time: 7/30/2021  2:02 PM    Current PCP: No primary care provider on file. Clinic patient: No    Hospitalization in the last 30 days: No    Advance Directives:  Code Status: Full Code  [address] DNR form completed and on chart: Not Indicated    Financial:  Payor: /      Pharmacy:    [facility], [address] [address]  Phone: [phone] Fax: [phone]      Assistance purchasing medications?:    Assistance provided by Case Management: None at this time    Does patient want to participate in local refill/ meds to beds program?:      Meds To Beds General Rules:  1. Can ONLY be done Monday- Friday between 8:30am-5pm  2. Prescription(s) must be in pharmacy by 3pm to be filled same day  3. Copy of patient's insurance/ prescription drug card and patient face sheet must be sent along with the prescription(s)  4. Cost of Rx cannot be added to hospital bill. If financial assistance is needed, please contact unit  or ;  or  CANNOT provide pharmacy voucher for patients co-pays  5.  Patients can then  the prescription on their way out of the hospital at discharge, or pharmacy can deliver to the bedside if staff is available. (payment due at time of pick-up or delivery - cash, check, or card accepted)     Able to afford home medications/ co-pay costs: Yes    ADLS:  Current PT AM-PAC Score:   /24  Current OT AM-PAC Score:   /24      DISCHARGE Disposition: Home- No Services Needed    LOC at discharge: Not Applicable  RAVINDER Completed: Not Indicated    Notification completed in HENS/PAS?:  Not Applicable    IMM Completed:   Not Indicated    Transportation:  Transportation PLAN for discharge: family   Mode of Transport: [address] ordered at discharge: Not [address]: Not Applicable  Orders faxed: No    Durable Medical Equipment:  DME Provider: none  Equipment obtained during hospitalization:     Home Oxygen and Respiratory Equipment:  Oxygen needed at discharge?: Not [address]: Not Applicable  Portable tank available for discharge?: Not Indicated    Dialysis:  Dialysis patient: Yes [facility]    [facility]   Address:    Phone: Additional CM Notes: PT from home will DC home no needs can ambulate safely per nurse and pt states he can now see and will drive himself to HD at [facility]. The Plan for Transition of Care is related to the following treatment goals of Metabolic acidosis [F17.9]    The Patient and/or patient representative Dalila Crowell and his family were provided with a choice of provider and agrees with the discharge plan Yes    Freedom of choice list was provided with basic dialogue that supports the patient's individualized plan of care/goals and shares the quality data associated with the providers.  Not Indicated    Care Transitions patient: No    Chris Portillo RN  [facility]  Case Management Department  Ph: [phone]  Fax: [phone]

## 2021-08-01 NOTE — PLAN OF CARE
Problem: Confusion - Acute:  Goal: Absence of continued neurological deterioration signs and symptoms  Description: Absence of continued neurological deterioration signs and symptoms  Outcome: Ongoing   Pt is alert and oriented X4. Follows instructions and aware of needs. Problem: Skin Integrity:  Goal: Will show no infection signs and symptoms  Description: Will show no infection signs and symptoms  Outcome: Ongoing   Pt with no new skin breakdown during this shift. Vital signs are stable, pt able to transfer appropriately. Pt able to turn self. Continue with current care. Problem: Falls - Risk of:  Goal: Will remain free from falls  Description: Will remain free from falls  Outcome: Ongoing   Pt remains without falls during this shift. Pt does not attempt to get OOB alone. Pt able to call out appropriately, call light within reach. Safety precautions in place include fall armband, fall towel, chair & bed alarms, non slip foot wear. Signs posted on door, and door remains open for observation. The bed is in lowest position, wheels are locked, and rails are up 2/4. Will continue with current care.

## 2021-08-01 NOTE — PROGRESS NOTES
Nephrology Consult Note        [facility]    [email or web address]       Phone: [phone]                                                  8/1/2021 8:37 AM     Patient: Radha Moreno [id number]  [id number]  Date of Admit: 7/30/2021 LOS: 2 days  Referring physician:      Assessment & Plan     Renal function:  ESRD  On iHD  Patient is non complaint   Patient is complaining of SOB. No chest pain. Electrolytes:  Severe hyperkalemia. Lab Results   Component Value Date    CREATININE 9.2 ([place]) 08/01/2021    BUN 68 (H) 08/01/2021     08/01/2021    K 4.7 08/01/2021    CL 96 (L) 08/01/2021    CO2 28 08/01/2021            # Renal Osteodystrophy:   Lab Results   Component Value Date    .5 (H) 07/31/2021    CALCIUM 7.6 (L) 08/01/2021    PHOS 4.6 07/31/2021         Acid/Base status:  AGMA. Severe due to non compliance with HD. Volume status/BP:  Patient is hypertensive. Hypervolemic      Hematology:   Lab Results   Component Value Date    IRON 74 07/31/2021    TIBC 131 (L) 07/31/2021    FERRITIN 893.4 (H) 07/31/2021     Lab Results   Component Value Date    WBC 10.5 08/01/2021    HGB 7.2 (L) 08/01/2021    HCT 20.7 (LL) 08/01/2021    MCV 92.4 08/01/2021     08/01/2021     CKD related anemia: Hb is low. Goal Hgb 10-11.5 g/dl.   Transfusion goal per primary team.         Plan:    SP HD yesterday with 2.5 liters removal.    Potassium improved    Continue Binders and add calcitriol once PO4 under control  Ferritin is high    Started procrit for anemia   Add Toprol XL for HTN     Avoid nephrotoxins  Monitor input, output and daily weight  Renally dose all medications        Thank you for allowing us to participate in this patient's care    In case of any question please call us at our 24 hour answering service [phone] or from 7 AM to 5 PM via Perfect [employer] or cell number    Maria Del Rosario Florian MD  [facility] Nephrology  [address], [address]  Fax: [phone]  Office: [phone]               Reason for Consult and Chief Complaint     Reason for consult: ESRD    Chief complaint:   Chief Complaint   Patient presents with    Shortness of Breath     had eye procedure last week, has not been able to drive and therefore has not been to HD in two weeks    Eye Pain       History of Present Illness   Pam Qiu is a 63 y.o. with PMH significant for ESRD on HD,  HTN presents from home via EMS for evaluation of SOB. Pt has not been able to go to dialysis and unable to walk as he is unable to see. Patient also had recent eye laser surgery. Nephrology is consulted for assist in dialysis management. BP is elevated in ER. CXR in ER showed focal opacities in the right middle lower lung zones. There may be focal airspace disease in the left midlung zone as well. K is very high close to 8 and severe AGMA. Patient is uncomfortable and complaining of SOB and he is on oxygen. Patient denies chest pain, GI symptoms. No report of fever. No fall or trauma. Review of Systems     Unable to ask full ROS as patient is in distress. Past Medical History     Past Medical History:   Diagnosis Date    Chronic kidney disease     Hypertension          Past Surgical History     No past surgical history on file. Family History     No family history on file. Social History     Social History     Tobacco Use    Smoking status: Former Smoker    Smokeless tobacco: Never Used   Substance Use Topics    Alcohol use: Not Currently          Past medical, family, and social histories were reviewed as previously documented. Updates were made as necessary.       Inpatient Medications and Allergies       Scheduled Meds:    Allergies: No Known Allergies      Vital Signs     Vitals:    08/01/21 0816   BP: (!) 186/87   Pulse: 94   Resp: 20   Temp: 100.2 °F (37.9 °C)   SpO2: 97%         Intake/Output Summary (Last 24 hours) at 8/1/2021 9531  Last data filed at 8/1/2021 1592  Gross per 24 hour   Intake 2150 ml   Output 850 ml   Net 1300 ml         Physical Exam     General appearance: Patient is in respiratory distress. Head: Normocephalic, without obvious abnormality, atraumatic   Mouth: Moist mucous membrane  Neck: Supple. Lungs: Patient is on oxygen and in respiratory distress. No wheezing. Heart: S1, S2. No tachycardia. BP elevated. Abdomen: soft, non-tender non-distended  Extremities: No leg edema, warm to touch   Skin: No concerning rashes noted  Psych: good eye contact, normal affect  Neuro: AAO x 3. Moving all extremities. AVF: Good thrill. No signs of infection.        Laboratory Data     Please see above     Diagnostic Studies   Pertinent images reviewed

## 2021-08-03 ENCOUNTER — APPOINTMENT (OUTPATIENT)
Dept: GENERAL RADIOLOGY | Age: 61
DRG: 312 | End: 2021-08-03
Payer: COMMERCIAL

## 2021-08-03 ENCOUNTER — APPOINTMENT (OUTPATIENT)
Dept: CT IMAGING | Age: 61
DRG: 312 | End: 2021-08-03
Payer: COMMERCIAL

## 2021-08-03 ENCOUNTER — HOSPITAL ENCOUNTER (INPATIENT)
Age: 61
LOS: 1 days | Discharge: HOME | DRG: 312 | End: 2021-08-04
Attending: EMERGENCY MEDICINE | Admitting: INTERNAL MEDICINE
Payer: COMMERCIAL

## 2021-08-03 DIAGNOSIS — D64.9 ANEMIA, UNSPECIFIED TYPE: ICD-10-CM

## 2021-08-03 DIAGNOSIS — R55 SYNCOPE AND COLLAPSE: Primary | ICD-10-CM

## 2021-08-03 DIAGNOSIS — R42 DIZZINESS: ICD-10-CM

## 2021-08-03 PROBLEM — I95.9 HYPOTENSION: Status: ACTIVE | Noted: 2021-08-03

## 2021-08-03 LAB
ABO/RH: NORMAL
ALBUMIN SERPL-MCNC: 3 G/DL (ref 3.4–5)
ALP BLD-CCNC: 66 U/L (ref 40–129)
ALT SERPL-CCNC: 15 U/L (ref 10–40)
AMMONIA: 12 UMOL/L (ref 16–60)
ANION GAP SERPL CALCULATED.3IONS-SCNC: 20 MMOL/L (ref 3–16)
ANION GAP SERPL CALCULATED.3IONS-SCNC: 23 MMOL/L (ref 3–16)
ANTIBODY SCREEN: NORMAL
AST SERPL-CCNC: 16 U/L (ref 15–37)
BASE EXCESS VENOUS: 7.1 MMOL/L (ref -2–3)
BASOPHILS ABSOLUTE: 0.1 K/UL (ref 0–0.2)
BASOPHILS RELATIVE PERCENT: 0.5 %
BILIRUB SERPL-MCNC: <0.2 MG/DL (ref 0–1)
BILIRUBIN DIRECT: <0.2 MG/DL (ref 0–0.3)
BILIRUBIN, INDIRECT: ABNORMAL MG/DL (ref 0–1)
BLOOD BANK DISPENSE STATUS: NORMAL
BLOOD BANK PRODUCT CODE: NORMAL
BPU ID: NORMAL
BUN BLDV-MCNC: 83 MG/DL (ref 7–20)
BUN BLDV-MCNC: 96 MG/DL (ref 7–20)
CALCIUM SERPL-MCNC: 6 MG/DL (ref 8.3–10.6)
CALCIUM SERPL-MCNC: 7.2 MG/DL (ref 8.3–10.6)
CARBOXYHEMOGLOBIN: 3.9 % (ref 0–1.5)
CHLORIDE BLD-SCNC: 85 MMOL/L (ref 99–110)
CHLORIDE BLD-SCNC: 90 MMOL/L (ref 99–110)
CO2: 24 MMOL/L (ref 21–32)
CO2: 27 MMOL/L (ref 21–32)
CREAT SERPL-MCNC: 11.2 MG/DL (ref 0.9–1.3)
CREAT SERPL-MCNC: 12.8 MG/DL (ref 0.9–1.3)
DESCRIPTION BLOOD BANK: NORMAL
EKG ATRIAL RATE: 69 BPM
EKG ATRIAL RATE: 72 BPM
EKG ATRIAL RATE: 79 BPM
EKG DIAGNOSIS: NORMAL
EKG P AXIS: -15 DEGREES
EKG P AXIS: 30 DEGREES
EKG P AXIS: 9 DEGREES
EKG P-R INTERVAL: 122 MS
EKG P-R INTERVAL: 148 MS
EKG P-R INTERVAL: 162 MS
EKG Q-T INTERVAL: 438 MS
EKG Q-T INTERVAL: 446 MS
EKG Q-T INTERVAL: 466 MS
EKG QRS DURATION: 100 MS
EKG QRS DURATION: 104 MS
EKG QRS DURATION: 96 MS
EKG QTC CALCULATION (BAZETT): 488 MS
EKG QTC CALCULATION (BAZETT): 499 MS
EKG QTC CALCULATION (BAZETT): 502 MS
EKG R AXIS: 41 DEGREES
EKG R AXIS: 43 DEGREES
EKG R AXIS: 46 DEGREES
EKG T AXIS: 69 DEGREES
EKG T AXIS: 74 DEGREES
EKG T AXIS: 79 DEGREES
EKG VENTRICULAR RATE: 69 BPM
EKG VENTRICULAR RATE: 72 BPM
EKG VENTRICULAR RATE: 79 BPM
EOSINOPHILS ABSOLUTE: 0.4 K/UL (ref 0–0.6)
EOSINOPHILS RELATIVE PERCENT: 2.6 %
GFR AFRICAN AMERICAN: 5
GFR AFRICAN AMERICAN: 6
GFR NON-AFRICAN AMERICAN: 4
GFR NON-AFRICAN AMERICAN: 5
GLUCOSE BLD-MCNC: 170 MG/DL (ref 70–99)
GLUCOSE BLD-MCNC: 174 MG/DL (ref 70–99)
GLUCOSE BLD-MCNC: 200 MG/DL (ref 70–99)
GLUCOSE BLD-MCNC: 201 MG/DL
GLUCOSE BLD-MCNC: 201 MG/DL (ref 70–99)
GLUCOSE BLD-MCNC: 738 MG/DL (ref 70–99)
HCO3 VENOUS: 32.2 MMOL/L (ref 24–28)
HCT VFR BLD CALC: 20.8 % (ref 40.5–52.5)
HCT VFR BLD CALC: 21 % (ref 40.5–52.5)
HEMOGLOBIN, VEN, REDUCED: 17.6 %
HEMOGLOBIN: 7 G/DL (ref 13.5–17.5)
IMMATURE RETIC FRACT: 0.53 (ref 0.21–0.37)
INR BLD: 1.31 (ref 0.88–1.12)
LACTIC ACID: 1.6 MMOL/L (ref 0.4–2)
LIPASE: 39 U/L (ref 13–60)
LV EF: 55 %
LVEF MODALITY: NORMAL
LYMPHOCYTES ABSOLUTE: 1.2 K/UL (ref 1–5.1)
LYMPHOCYTES RELATIVE PERCENT: 8.4 %
MAGNESIUM: 2.2 MG/DL (ref 1.8–2.4)
MCH RBC QN AUTO: 31.1 PG (ref 26–34)
MCHC RBC AUTO-ENTMCNC: 33.6 G/DL (ref 31–36)
MCV RBC AUTO: 92.6 FL (ref 80–100)
METHEMOGLOBIN VENOUS: 0.4 % (ref 0–1.5)
MONOCYTES ABSOLUTE: 0.9 K/UL (ref 0–1.3)
MONOCYTES RELATIVE PERCENT: 6.7 %
NEUTROPHILS ABSOLUTE: 11.2 K/UL (ref 1.7–7.7)
NEUTROPHILS RELATIVE PERCENT: 81.8 %
O2 SAT, VEN: 82 %
PCO2, VEN: 48.5 MMHG (ref 41–51)
PDW BLD-RTO: 14.8 % (ref 12.4–15.4)
PERFORMED ON: ABNORMAL
PH VENOUS: 7.43 (ref 7.35–7.45)
PLATELET # BLD: 429 K/UL (ref 135–450)
PMV BLD AUTO: 8.6 FL (ref 5–10.5)
PO2, VEN: 47.3 MMHG (ref 25–40)
POTASSIUM REFLEX MAGNESIUM: 5.6 MMOL/L (ref 3.5–5.1)
POTASSIUM SERPL-SCNC: 4.8 MMOL/L (ref 3.5–5.1)
PRO-BNP: ABNORMAL PG/ML (ref 0–124)
PROCALCITONIN: 1.98 NG/ML (ref 0–0.15)
PROTHROMBIN TIME: 15 SEC (ref 9.9–12.7)
RBC # BLD: 2.26 M/UL (ref 4.2–5.9)
RETICULOCYTE ABSOLUTE COUNT: 0.03 M/UL
RETICULOCYTE COUNT PCT: 1.1 % (ref 0.5–2.18)
SODIUM BLD-SCNC: 132 MMOL/L (ref 136–145)
SODIUM BLD-SCNC: 137 MMOL/L (ref 136–145)
TCO2 CALC VENOUS: 34 MMOL/L
TOTAL PROTEIN: 6.3 G/DL (ref 6.4–8.2)
TROPONIN: 0.17 NG/ML
TROPONIN: 0.2 NG/ML
TROPONIN: 0.37 NG/ML
WBC # BLD: 13.7 K/UL (ref 4–11)

## 2021-08-03 PROCEDURE — 83880 ASSAY OF NATRIURETIC PEPTIDE: CPT

## 2021-08-03 PROCEDURE — 93005 ELECTROCARDIOGRAM TRACING: CPT | Performed by: EMERGENCY MEDICINE

## 2021-08-03 PROCEDURE — 85610 PROTHROMBIN TIME: CPT

## 2021-08-03 PROCEDURE — 2580000003 HC RX 258: Performed by: EMERGENCY MEDICINE

## 2021-08-03 PROCEDURE — 93005 ELECTROCARDIOGRAM TRACING: CPT | Performed by: STUDENT IN AN ORGANIZED HEALTH CARE EDUCATION/TRAINING PROGRAM

## 2021-08-03 PROCEDURE — 99284 EMERGENCY DEPT VISIT MOD MDM: CPT

## 2021-08-03 PROCEDURE — 82803 BLOOD GASES ANY COMBINATION: CPT

## 2021-08-03 PROCEDURE — 83605 ASSAY OF LACTIC ACID: CPT

## 2021-08-03 PROCEDURE — 84145 PROCALCITONIN (PCT): CPT

## 2021-08-03 PROCEDURE — 2500000003 HC RX 250 WO HCPCS: Performed by: STUDENT IN AN ORGANIZED HEALTH CARE EDUCATION/TRAINING PROGRAM

## 2021-08-03 PROCEDURE — 2580000003 HC RX 258: Performed by: STUDENT IN AN ORGANIZED HEALTH CARE EDUCATION/TRAINING PROGRAM

## 2021-08-03 PROCEDURE — 99233 SBSQ HOSP IP/OBS HIGH 50: CPT | Performed by: INTERNAL MEDICINE

## 2021-08-03 PROCEDURE — 70450 CT HEAD/BRAIN W/O DYE: CPT

## 2021-08-03 PROCEDURE — 80076 HEPATIC FUNCTION PANEL: CPT

## 2021-08-03 PROCEDURE — 90935 HEMODIALYSIS ONE EVALUATION: CPT

## 2021-08-03 PROCEDURE — 85025 COMPLETE CBC W/AUTO DIFF WBC: CPT

## 2021-08-03 PROCEDURE — 93306 TTE W/DOPPLER COMPLETE: CPT

## 2021-08-03 PROCEDURE — 86850 RBC ANTIBODY SCREEN: CPT

## 2021-08-03 PROCEDURE — 71045 X-RAY EXAM CHEST 1 VIEW: CPT

## 2021-08-03 PROCEDURE — 36415 COLL VENOUS BLD VENIPUNCTURE: CPT

## 2021-08-03 PROCEDURE — 6370000000 HC RX 637 (ALT 250 FOR IP): Performed by: STUDENT IN AN ORGANIZED HEALTH CARE EDUCATION/TRAINING PROGRAM

## 2021-08-03 PROCEDURE — 84484 ASSAY OF TROPONIN QUANT: CPT

## 2021-08-03 PROCEDURE — 5A1D70Z PERFORMANCE OF URINARY FILTRATION, INTERMITTENT, LESS THAN 6 HOURS PER DAY: ICD-10-PCS | Performed by: STUDENT IN AN ORGANIZED HEALTH CARE EDUCATION/TRAINING PROGRAM

## 2021-08-03 PROCEDURE — 83735 ASSAY OF MAGNESIUM: CPT

## 2021-08-03 PROCEDURE — 6370000000 HC RX 637 (ALT 250 FOR IP): Performed by: EMERGENCY MEDICINE

## 2021-08-03 PROCEDURE — 83690 ASSAY OF LIPASE: CPT

## 2021-08-03 PROCEDURE — 6360000002 HC RX W HCPCS: Performed by: STUDENT IN AN ORGANIZED HEALTH CARE EDUCATION/TRAINING PROGRAM

## 2021-08-03 PROCEDURE — 86901 BLOOD TYPING SEROLOGIC RH(D): CPT

## 2021-08-03 PROCEDURE — 82140 ASSAY OF AMMONIA: CPT

## 2021-08-03 PROCEDURE — P9016 RBC LEUKOCYTES REDUCED: HCPCS

## 2021-08-03 PROCEDURE — 84443 ASSAY THYROID STIM HORMONE: CPT

## 2021-08-03 PROCEDURE — 85045 AUTOMATED RETICULOCYTE COUNT: CPT

## 2021-08-03 PROCEDURE — 2000000000 HC ICU R&B

## 2021-08-03 PROCEDURE — 86923 COMPATIBILITY TEST ELECTRIC: CPT

## 2021-08-03 PROCEDURE — 87040 BLOOD CULTURE FOR BACTERIA: CPT

## 2021-08-03 PROCEDURE — 80048 BASIC METABOLIC PNL TOTAL CA: CPT

## 2021-08-03 PROCEDURE — 86900 BLOOD TYPING SEROLOGIC ABO: CPT

## 2021-08-03 RX ORDER — SODIUM CHLORIDE, SODIUM LACTATE, POTASSIUM CHLORIDE, AND CALCIUM CHLORIDE .6; .31; .03; .02 G/100ML; G/100ML; G/100ML; G/100ML
1000 INJECTION, SOLUTION INTRAVENOUS ONCE
Status: DISCONTINUED | OUTPATIENT
Start: 2021-08-03 | End: 2021-08-03

## 2021-08-03 RX ORDER — SODIUM CHLORIDE 0.9 % (FLUSH) 0.9 %
5-40 SYRINGE (ML) INJECTION EVERY 12 HOURS SCHEDULED
Status: DISCONTINUED | OUTPATIENT
Start: 2021-08-03 | End: 2021-08-04 | Stop reason: HOSPADM

## 2021-08-03 RX ORDER — POLYETHYLENE GLYCOL 3350 17 G/17G
17 POWDER, FOR SOLUTION ORAL DAILY PRN
Status: DISCONTINUED | OUTPATIENT
Start: 2021-08-03 | End: 2021-08-04 | Stop reason: HOSPADM

## 2021-08-03 RX ORDER — ASPIRIN 325 MG
325 TABLET ORAL ONCE
Status: COMPLETED | OUTPATIENT
Start: 2021-08-03 | End: 2021-08-03

## 2021-08-03 RX ORDER — DEXTROSE MONOHYDRATE 50 MG/ML
100 INJECTION, SOLUTION INTRAVENOUS PRN
Status: DISCONTINUED | OUTPATIENT
Start: 2021-08-03 | End: 2021-08-04 | Stop reason: HOSPADM

## 2021-08-03 RX ORDER — INSULIN LISPRO 100 [IU]/ML
0-6 INJECTION, SOLUTION INTRAVENOUS; SUBCUTANEOUS
Status: DISCONTINUED | OUTPATIENT
Start: 2021-08-03 | End: 2021-08-04 | Stop reason: HOSPADM

## 2021-08-03 RX ORDER — NICOTINE POLACRILEX 4 MG
15 LOZENGE BUCCAL PRN
Status: DISCONTINUED | OUTPATIENT
Start: 2021-08-03 | End: 2021-08-04 | Stop reason: HOSPADM

## 2021-08-03 RX ORDER — SODIUM CHLORIDE 9 MG/ML
25 INJECTION, SOLUTION INTRAVENOUS PRN
Status: DISCONTINUED | OUTPATIENT
Start: 2021-08-03 | End: 2021-08-04 | Stop reason: HOSPADM

## 2021-08-03 RX ORDER — ONDANSETRON 2 MG/ML
4 INJECTION INTRAMUSCULAR; INTRAVENOUS EVERY 6 HOURS PRN
Status: DISCONTINUED | OUTPATIENT
Start: 2021-08-03 | End: 2021-08-04 | Stop reason: HOSPADM

## 2021-08-03 RX ORDER — ACETAMINOPHEN 650 MG/1
650 SUPPOSITORY RECTAL EVERY 6 HOURS PRN
Status: DISCONTINUED | OUTPATIENT
Start: 2021-08-03 | End: 2021-08-04 | Stop reason: HOSPADM

## 2021-08-03 RX ORDER — SODIUM CHLORIDE, SODIUM LACTATE, POTASSIUM CHLORIDE, AND CALCIUM CHLORIDE .6; .31; .03; .02 G/100ML; G/100ML; G/100ML; G/100ML
500 INJECTION, SOLUTION INTRAVENOUS ONCE
Status: COMPLETED | OUTPATIENT
Start: 2021-08-03 | End: 2021-08-03

## 2021-08-03 RX ORDER — ONDANSETRON 4 MG/1
4 TABLET, ORALLY DISINTEGRATING ORAL EVERY 8 HOURS PRN
Status: DISCONTINUED | OUTPATIENT
Start: 2021-08-03 | End: 2021-08-04 | Stop reason: HOSPADM

## 2021-08-03 RX ORDER — INSULIN LISPRO 100 [IU]/ML
0-3 INJECTION, SOLUTION INTRAVENOUS; SUBCUTANEOUS NIGHTLY
Status: DISCONTINUED | OUTPATIENT
Start: 2021-08-03 | End: 2021-08-04 | Stop reason: HOSPADM

## 2021-08-03 RX ORDER — ACETAMINOPHEN 325 MG/1
650 TABLET ORAL EVERY 6 HOURS PRN
Status: DISCONTINUED | OUTPATIENT
Start: 2021-08-03 | End: 2021-08-04 | Stop reason: HOSPADM

## 2021-08-03 RX ORDER — DEXTROSE MONOHYDRATE 25 G/50ML
25 INJECTION, SOLUTION INTRAVENOUS ONCE
Status: COMPLETED | OUTPATIENT
Start: 2021-08-03 | End: 2021-08-03

## 2021-08-03 RX ORDER — HEPARIN SODIUM 5000 [USP'U]/ML
5000 INJECTION, SOLUTION INTRAVENOUS; SUBCUTANEOUS EVERY 8 HOURS SCHEDULED
Status: DISCONTINUED | OUTPATIENT
Start: 2021-08-04 | End: 2021-08-04 | Stop reason: HOSPADM

## 2021-08-03 RX ORDER — DEXTROSE MONOHYDRATE 25 G/50ML
12.5 INJECTION, SOLUTION INTRAVENOUS PRN
Status: DISCONTINUED | OUTPATIENT
Start: 2021-08-03 | End: 2021-08-04 | Stop reason: HOSPADM

## 2021-08-03 RX ORDER — SODIUM CHLORIDE 9 MG/ML
INJECTION, SOLUTION INTRAVENOUS PRN
Status: DISCONTINUED | OUTPATIENT
Start: 2021-08-03 | End: 2021-08-04 | Stop reason: HOSPADM

## 2021-08-03 RX ORDER — SODIUM CHLORIDE 0.9 % (FLUSH) 0.9 %
5-40 SYRINGE (ML) INJECTION PRN
Status: DISCONTINUED | OUTPATIENT
Start: 2021-08-03 | End: 2021-08-04 | Stop reason: HOSPADM

## 2021-08-03 RX ORDER — SODIUM CHLORIDE 9 MG/ML
INJECTION, SOLUTION INTRAVENOUS CONTINUOUS
Status: ACTIVE | OUTPATIENT
Start: 2021-08-03 | End: 2021-08-03

## 2021-08-03 RX ADMIN — SODIUM CHLORIDE, SODIUM LACTATE, POTASSIUM CHLORIDE, AND CALCIUM CHLORIDE 500 ML: .6; .31; .03; .02 INJECTION, SOLUTION INTRAVENOUS at 11:32

## 2021-08-03 RX ADMIN — DEXTROSE MONOHYDRATE 1000 MG: 5 INJECTION INTRAVENOUS at 14:22

## 2021-08-03 RX ADMIN — ASPIRIN 325 MG ORAL TABLET 325 MG: 325 PILL ORAL at 11:32

## 2021-08-03 RX ADMIN — DEXTROSE MONOHYDRATE 25 G: 25 INJECTION, SOLUTION INTRAVENOUS at 15:31

## 2021-08-03 RX ADMIN — INSULIN HUMAN 10 UNITS: 100 INJECTION, SOLUTION PARENTERAL at 15:31

## 2021-08-03 RX ADMIN — INSULIN LISPRO 1 UNITS: 100 INJECTION, SOLUTION INTRAVENOUS; SUBCUTANEOUS at 21:33

## 2021-08-03 RX ADMIN — VANCOMYCIN HYDROCHLORIDE 1000 MG: 10 INJECTION, POWDER, LYOPHILIZED, FOR SOLUTION INTRAVENOUS at 15:30

## 2021-08-03 RX ADMIN — SODIUM ZIRCONIUM CYCLOSILICATE 10 G: 10 POWDER, FOR SUSPENSION ORAL at 15:31

## 2021-08-03 RX ADMIN — SODIUM CHLORIDE, POTASSIUM CHLORIDE, SODIUM LACTATE AND CALCIUM CHLORIDE 1000 ML: 600; 310; 30; 20 INJECTION, SOLUTION INTRAVENOUS at 15:50

## 2021-08-03 RX ADMIN — SODIUM CHLORIDE: 9 INJECTION, SOLUTION INTRAVENOUS at 16:03

## 2021-08-03 RX ADMIN — Medication 5 ML: at 21:36

## 2021-08-03 ASSESSMENT — ENCOUNTER SYMPTOMS
APNEA: 0
SHORTNESS OF BREATH: 0
GASTROINTESTINAL NEGATIVE: 1
DIARRHEA: 0
CONSTIPATION: 0
COUGH: 0
NAUSEA: 0
VOMITING: 0
RHINORRHEA: 0
WHEEZING: 0
BACK PAIN: 0
ABDOMINAL PAIN: 0
CHOKING: 0
TROUBLE SWALLOWING: 0
EYES NEGATIVE: 1
CHEST TIGHTNESS: 0
RESPIRATORY NEGATIVE: 1
SORE THROAT: 0

## 2021-08-03 ASSESSMENT — PAIN SCALES - GENERAL
PAINLEVEL_OUTOF10: 0
PAINLEVEL_OUTOF10: 0

## 2021-08-03 NOTE — CONSULTS
Nephrology Consult Note        [facility]phrology    [email or web address] [place]       Phone: [phone]                                                  8/3/2021 2:08 PM     Patient: Jose Daniel Amaya [id number]  [id number]/[id number]  Date of Admit: 8/3/2021 LOS: 0 days  Referring physician:      Assessment & Plan     Renal function:  ESRD  On HD MWF    K is 5.6. Mildly elevated. Plan for RRT once patient is transferred to ICU  Monitor renal panel        Electrolytes:  Lab Results   Component Value Date    CREATININE 9.2 (HH) 08/01/2021    BUN 68 (H) 08/01/2021     08/03/2021    K 5.6 (H) 08/03/2021    CL 90 (L) 08/03/2021    CO2 28 08/01/2021            # Renal Osteodystrophy:   Secondary hyperparathyroidism due to renal failure. Lab Results   Component Value Date    .5 (H) 07/31/2021    CALCIUM 7.6 (L) 08/01/2021    PHOS 4.6 07/31/2021         Acid/Base status:  Ph on VBG 7.4    Volume status/BP:  Patient is hypotensive in 70 s range. Rule out sepsis and patient is started on Abx. Also looks IV volume depleted and giving IVF and will transfuse PRBC      Hematology:   Lab Results   Component Value Date    IRON 74 07/31/2021    TIBC 131 (L) 07/31/2021    FERRITIN 893.4 (H) 07/31/2021     Lab Results   Component Value Date    WBC 13.7 (H) 08/03/2021    HGB 7.0 (L) 08/03/2021    HCT 21.0 (L) 08/03/2021    MCV 92.6 08/03/2021     08/03/2021     CKD related anemia:   Goal Hb 10-11. Transfusion goal per primary team.         Plan:  Patient has low BP in 80s in ER. HR is fine. Patient is fully oriented  Please give IVF and also PRBC and it will help in BP  Hold BB  Patient has AVF in left Arm and if BP gets better will do HD otherwise will need to place HD catheter and start CRRT  Requested for ICU admission for close monitoring. Low K diet. While waiting to stabilize BP, please give Lokelma 10 Gram stat in ER and also insulin+ dextrose  Antibiotics and sepsis work up ongoing.    Avoid nephrotoxins  Monitor input, output and daily weight  Renally dose all medications    Discussed with ER team, patient and his sister in detail. Also informed HD Charge RN      Thank you for allowing us to participate in this patient's care    In case of any question please call us at our 24 hour answering service [phone] or from 7 AM to 5 PM via Perfect [employer] or cell number    Robert Turcios MD  [facility] Nephrology  [address], [address]  Fax: [phone]  Office: [phone]               Reason for Consult and Chief Complaint     Reason for consult: ESRD    Chief complaint:   Chief Complaint   Patient presents with    Loss of Consciousness     patient found by family with altered LOC, less responsive, seen here recently for eye pain and abnormal labs, hadn't been to HD in a while, -CP, -SOB       History of Present Illness   Barb Silva is a 63 y.o. with PMH significant for ESRD on HD,  HTN admitted after being found down at home. CT head no acute IC process. CXR showed stable B/L air space disease. Labs showing K of 5.6, mild elevated troponin, leukocytosis with anemia. VBG PH s 7.4. Patient is hypotensive in ER. Patient seen in ER and he is accompanied by his sister in ER and patient is comfortable and oriented x 3. Patient was feeling fine after discharge and today was his dialysis day and his sister was coming to pick him up from his home but he was not feeling well. Later there is  Report of passing out for uncertain duration. Patient is hypotensive in ER and he feels fine. Patient do not feel SOB and he do not feel that he is volume overloaded. Patient denies any chest pain or pain anywhere else. No bleeding. Hb is stable as before although it is low in 7 range. No fever, cough. Patient has leukocytosis. No GI symptoms. Patient is on oxygen while in ER. Patient do not use oxygen at home. Patient's sister is concerned that he might took his metoprolol twice. HR in ER in 70s range. Review of Systems     GEN: No recent fever, chills or night sweats. Appetite is good. No weight loss. HEENT: No changes in vision, sore throat, rhinorrhea   CVS: No cp, palpitations or swelling in legs  Pulmonary: No cough or hemoptysis or SOB  GI: No nausea, vomiting, diarrhea, constipation or abdominal pain  : No bowel or bladder incontinence, no dysuria, no hematuria. MSK: No muscle or joint or bone pains  Skin: No rashes. CNS: No headache, dizziness, confusion or focal weakness. No seizure. + Syncope. Psych: No anxiety, agitation or depression. Reviewed all 12 systems, negative except as above. Past Medical History     Past Medical History:   Diagnosis Date    Chronic kidney disease     Hypertension          Past Surgical History     No past surgical history on file. Family History     No family history on file. Social History     Social History     Tobacco Use    Smoking status: Former Smoker    Smokeless tobacco: Never Used   Substance Use Topics    Alcohol use: Not Currently          Past medical, family, and social histories were reviewed as previously documented. Updates were made as necessary. Inpatient Medications and Allergies       Scheduled Meds:   cefTRIAXone (ROCEPHIN) IV  1,000 mg Intravenous Once    vancomycin  1,000 mg Intravenous Once    sodium zirconium cyclosilicate  10 g Oral Once       Allergies: No Known Allergies      Vital Signs     Vitals:    08/03/21 1357   BP: (!) 81/53   Pulse: 71   Resp: 15   Temp:    SpO2: 92%         Intake/Output Summary (Last 24 hours) at 8/3/2021 1408  Last data filed at 8/3/2021 1335  Gross per 24 hour   Intake 500 ml   Output --   Net 500 ml         Physical Exam     General appearance: NAD  Head: Normocephalic, without obvious abnormality, atraumatic   Mouth: Moist mucous membrane  Neck: Supple. Lungs: Clear to auscultation bilaterally and no respiratory distress  Heart: S1, S2.  No tachycardia. Abdomen: soft, non-tender non-distended  Extremities: No leg edema, warm to touch   Skin: No concerning rashes noted  Psych: good eye contact, normal affect  Neuro: AAO x 3. Non focal grossly.    LUE: AVF, good bruit and thrill      Laboratory Data     Please see above     Diagnostic Studies   Pertinent images reviewed

## 2021-08-03 NOTE — ED PROVIDER NOTES
ED Attending Attestation Note     Date of evaluation: 8/3/2021    This patient was seen by the resident. I have seen and examined the patient, agree with the workup, evaluation, management and diagnosis. The care plan has been discussed. I have reviewed the ECG and concur with the resident's interpretation. My assessment reveals a chronically ill-appearing 65-year-old male patient who presents after he was reportedly found down by family outside of his house. He is well-known to EMS and had a recent admission for hyperkalemia secondary to missed dialysis. Patient was just discharged on 7/30 from this facility. His prehospital EKG showed ST elevations however significant [place] depressions as well and he is not complaining of any chest pain although I do not believe his history is entirely reliable. Bedside ultrasound reveals a small pericardial effusion however no significant left ventricular dysfunction.      Axel Mireles MD  08/03/21 5875

## 2021-08-03 NOTE — CONSULTS
ICU Consult Note       Hospital Day: 0  ICU Day: 0                                                         Code:Prior  Admit Date: 8/3/2021  PCP: No primary care provider on file. CC: Loss of conciousness    HISTORY OF PRESENT ILLNESS:   The patient is a 64 Y/O M with PMH of ESRD on HD, HTN, DM presented to ED by EMS after family stated that he had a loss of conciousness. Pateint was just discharged on Sunday from [facility] after missing dialysis with the initail complaints of SOB. The patient underwent dialysis and had approx 3L of fluid removed. During dialysis he went into A fib RVR was given metoprolol that decreased his BP so was switched to amiodarone. The patient stated that when he got up this morning he felt dizzy when waling around the house. He went outside and the next thing he remembers is waking up in the ambulance. The total time elapsed was approx 30 min accoring to family. The patient has denied any feeling of being unwell, fever, chills, cough, SOB, chest pain, NV or diarrhea. PAST HISTORY:     Past Medical History:   Diagnosis Date    Chronic kidney disease     Hypertension        No past surgical history on file. SocialHistory:   The patient lives at home     Alcohol: None reported   Illicit drugs: no use  Tobacco:  None reported     Family History:  No family history on file. MEDICATIONS:     No current facility-administered medications on file prior to encounter.      Current Outpatient Medications on File Prior to Encounter   Medication Sig Dispense Refill    insulin glargine (LANTUS) 100 UNIT/ML injection vial Inject 15 Units into the skin nightly      insulin lispro (HUMALOG) 100 UNIT/ML injection vial Inject into the skin 3 times daily (before meals) Sliding scale      metoprolol succinate (TOPROL XL) 25 MG extended release tablet Take 1 tablet by mouth daily 30 tablet 3    amLODIPine (NORVASC) 5 MG tablet Take 1 tablet by mouth daily 30 tablet 3  sevelamer (RENVELA) 800 MG tablet Take 2 tablets by mouth 3 times daily (with meals) 90 tablet 3    cefdinir (OMNICEF) 300 MG capsule Take 1 capsule by mouth 2 times daily for 5 days 10 capsule 0    azithromycin (ZITHROMAX) 500 MG tablet Take 1 tablet by mouth daily for 3 days 3 tablet 0         Scheduled Meds:   vancomycin  1,000 mg Intravenous Once    insulin lispro  0-6 Units Subcutaneous TID WC    insulin lispro  0-3 Units Subcutaneous Nightly    [START ON 8/4/2021] heparin (porcine)  5,000 Units Subcutaneous 3 times per day      Continuous Infusions:   sodium chloride      dextrose      sodium chloride 1,000 mL/hr at 08/03/21 1603     PRN Meds:sodium chloride, glucose, dextrose, glucagon (rDNA), dextrose    Allergies: No Known Allergies    REVIEW OF SYSTEMS:       History obtained from the patient and chart review     Review of Systems   Constitutional: Negative. Negative for chills, fatigue, fever and unexpected weight change. HENT: Negative. Eyes: Negative. Respiratory: Negative. Negative for cough, choking, chest tightness, shortness of breath and wheezing. Cardiovascular: Negative. Negative for chest pain, palpitations and leg swelling. Gastrointestinal: Negative. Negative for abdominal pain, constipation, diarrhea, nausea and vomiting. Endocrine: Negative for polyuria. Genitourinary: Negative. Negative for dysuria. Musculoskeletal: Negative. Negative for arthralgias and myalgias. Skin: Negative. Neurological: Positive for syncope and light-headedness. Negative for dizziness, tremors, seizures, facial asymmetry, speech difficulty, weakness, numbness and headaches. Psychiatric/Behavioral: Negative.         PHYSICAL EXAM:       Vitals: BP (!) 81/42   Pulse 67   Temp 98.4 °F (36.9 °C) (Oral)   Resp 15   SpO2 100%     I/O:      Intake/Output Summary (Last 24 hours) at 8/3/2021 1607  Last data filed at 8/3/2021 1606  Gross per 24 hour   Intake 650 ml   Output -- Net 650 ml     I/O this shift:  In: 150 [IV Piggyback:150]  Out: -   I/O last 3 completed shifts: In: 500 [IV Piggyback:500]  Out: -     Physical Examination:     Physical Exam  Constitutional:       General: He is not in acute distress. HENT:      Head: Normocephalic and atraumatic. Eyes:      General: No scleral icterus. Right eye: No discharge. Left eye: No discharge. Extraocular Movements: Extraocular movements intact. Conjunctiva/sclera: Conjunctivae normal.      Pupils: Pupils are equal, round, and reactive to light. Cardiovascular:      Rate and Rhythm: Normal rate and regular rhythm. Pulses: Normal pulses. Heart sounds: Murmur (diastolic ) heard. Pulmonary:      Effort: Pulmonary effort is normal. No respiratory distress. Breath sounds: No stridor. Rales (bilateral ) present. No wheezing or rhonchi. Abdominal:      General: Abdomen is flat. Bowel sounds are normal. There is no distension. Palpations: Abdomen is soft. Musculoskeletal:         General: Normal range of motion. Cervical back: Normal range of motion and neck supple. Right lower leg: No edema. Left lower leg: No edema. Skin:     General: Skin is warm and dry. Coloration: Skin is not jaundiced. Neurological:      General: No focal deficit present. Mental Status: He is alert. Mental status is at baseline. Cranial Nerves: No cranial nerve deficit. Sensory: No sensory deficit. Motor: No weakness. Coordination: Coordination normal.      Gait: Gait normal.      Deep Tendon Reflexes: Reflexes normal.   Psychiatric:         Mood and Affect: Mood normal.         Behavior: Behavior normal.         Thought Content: Thought content normal.         Judgment: Judgment normal.         No results for input(s): PHART, NYY5JDE, PO2ART in the last 72 hours.         DATA:       Labs:  CBC:   Recent Labs     08/01/21  0452 08/03/21  1143   WBC 10.5 13.7*   HGB fluids as seems to be fluid responsive but he is ESRD patient so will be gentle   -Tele    Hyperkalemia: Unsure of when underwent last dialysis. No peaked T waves on EKG   -Insulin and D50 ordered   -Nephrology consulted for possible CRRT    Leukocytosis: SIRS 1/4 for WBC elevated to 13.7 that is increased from his baseline however lasctic acid less then 2 without fever, tachycardia or tachypnea. Received Rocephin and VAnc in the ED   -BC sent FU results  -CXR shows stable bilateral airspace disease   -Hold off further Abx for now   -CBC daily  -F/U pro nathaniel   CT chest     ESRD on HD   -Nephrology consulted for HD but patinet remains hypotensive, however not requiring pressors at this point   -CRRT planned     Heart failure with preserved EF. Echo on 8/3 show EF 55% with no wall motion abnormalities  But grade 2 DD.   -Cardiology consulted appreciate recommendations     HTN: Patient usually has elevated BP byut has been low   -Received 500 ml bolus  -Would give another 1L bolus and reassess  -Holding home BP meds     DM: Initial glucose on admission 200.  -LDSS insulin and titrate as required   Holding home medications     Anemia of chronic disease. No obvious source of bleeding   -One unit of pRBC ordered   -F/U post tansfusion H/H     Code Status:Full   FEN: NPO   PPX:  Heparin  DISPO: ICU    This patient has been staffed and discussed with Dr Justin Murphy    -----------------------------  Josemanuel Gutierrez MD, PGY-2  8/3/2021  4:07 PM    Pulm/CC - Date of service 8/4/2021    Patient seen and examined. I agree with Dr. Mayuri Corey history, physical, lab findings, assessment and plan. Patient presented after syncopal episode on his porch. He had recently been admitted to the hospital where he had A. fib with RVR and was started on Toprol XL. He admits that he has not been eating and drinking as much. On admission to the ER he was hypotensive.   ER was very conservative with fluid and on further fluid resuscitation in ICU his blood pressure normalized. His evaluation was significant for EKG with mild elevation of ST segment elevation inferiorly. He was having no chest pain and cardiology ordered a stat echo that showed EF of 55% with no regional wall motion abnormalities. He does have grade 2 diastolic dysfunction. proBNP was elevated at greater than 70,000 and troponin only got to 0.32 at the highest.  Chest x-ray showed bilateral opacities improved on the right but a little bit worse on the left compared to recent. He denies any fevers, cough, dyspnea, or sputum production. He states he currently is on oral antibiotics for recently diagnosed pneumonia. This morning he is significantly better and offers no complaints. He is actually hypertensive. His hemoglobin is 7.7. Given he likely has CAD we will give him 1 unit of blood. We will start Lopressor 12.5 mg p.o. twice daily. I will check a tox screen. After discussion with cardiology we feel is best to proceed with a nuclear medicine test.    However he is the only caregiver for his wife who is at home covering from her 6 stroke. He states that he needs to go home today to take care of her but will follow up with cardiology nephrology as an outpatient. He has signed out [address]     Gabbi Alamo MD

## 2021-08-03 NOTE — PROGRESS NOTES
Clinical Pharmacy Consult Note    Admit date: 8/3/2021    Subjective/Objective:  64 yo male with PMHx significant for ESRD on hemodialysis T/T/S (non-compliant), HTN who presents complaining of dizziness. Patient recently admitted on 7/30/2021 for SOB secondary to PNA. He was discharged on new HTN medications and antibiotics (cefdinir and azithromycin). Patient now admitted for an abnormal ECG reading with troponin 0.37. CXR shows stable airspace disease; patient is afebrile with leukocytosis (13.7). Antibiotics initiated for treatment of possible pneumonia. Pharmacy is consulted to dose Vancomycin per Dr. Karan Chiu    Pertinent Medications:  Ceftriaxone 1 g IV q24h -- day #1  Vancomycin -- pharmacy to dose -- day #1   Intermittent dosing  Date Vanc Level Vanc Dose   8/3 -- 1 g   8/4 Ordered        Recent Labs     08/03/21  1143 08/03/21  1538    132*   K 5.6* 4.8   CL 90* 85*   CO2 27 24   BUN 96* 83*   CREATININE 12.8* 11.2*     CrCl cannot be calculated (Unknown ideal weight. ). Recent Labs     08/01/21  0452 08/01/21  0452 08/03/21  1141 08/03/21  1143   WBC 10.5  --   --  13.7*   HGB 7.2*  --   --  7.0*   HCT 20.7*   < > 20.8* 21.0*   MCV 92.4  --   --  92.6     --   --  429    < > = values in this interval not displayed. Micro:  8/3 Blood x2: In process  8/3 MRSA nares: Ordered    Assessment/Plan:  1. PNA:  Vancomycin + ceftriaxone  Vancomycin  · ESRD on HD TTS. Will dose intermittently by levels  · Patient received vancomycin 1g IV x1 dose in the ED. No further dosing indicated tonight. · Will obtain a random level tomorrow AM.  · Clinical pharmacist will follow-up in AM.  · Renal function will be monitored closely and dosing will be adjusted as appropriate. Please call with any questions.     Pool Resendez, PharmD  8/3/2021 6:08 PM  [id number]

## 2021-08-03 NOTE — PROGRESS NOTES
Saw patient again in ER. IVF being started and RN is giving Etheleen Sensor. PRBC has not yet started. BP in 80 s. Discussed with patient again need of CRRT and HD catheter placement if BP does not improve and he agree with plan. If BP gets better will do iHD overnight. Discussed plan with HD RN and advised to call in case of any questions.

## 2021-08-03 NOTE — ED NOTES
Bed: 1TR-01  Expected date:   Expected time:   Means of arrival:   Comments:  TY Brown RN  08/03/21 1122

## 2021-08-03 NOTE — CONSULTS
[address]         Reason for Consultation/Chief Complaint: \"I passed out. \"       History of Present Illness:  Melody Perrin is a 63 y.o. patient who presented to the hospital with complaints of passing out but does not remember it. He apparently was sitting in a chair on his porch and was found to be unconscious by his relative. .  Patient denies any chest pain whatsoever. The initial EKG shows an ST elevation but concave up in the inferior leads. Patient had a head CT which was unremarkable. Again the patient denies any chest pain and there is no reciprocal depressions on the EKG and this was repeated. Patient is profoundly anemic with a hemoglobin of 7.0 and a potassium of 5.6. Past Medical History:   has a past medical history of Chronic kidney disease and Hypertension. Surgical History:   has no past surgical history on file. Social History:   reports that he has quit smoking. He has never used smokeless tobacco. He reports previous alcohol use. He reports previous drug use. Family History:  No evidence for sudden cardiac death or premature CAD    Home Medications:  Were reviewed and are listed in nursing record. and/or listed below  Prior to Admission medications    Medication Sig Start Date End Date Taking?  Authorizing Provider   insulin glargine (LANTUS) 100 UNIT/ML injection vial Inject 15 Units into the skin nightly   Yes Historical Provider, MD   insulin lispro (HUMALOG) 100 UNIT/ML injection vial Inject into the skin 3 times daily (before meals) Sliding scale   Yes Historical Provider, MD   metoprolol succinate (TOPROL XL) 25 MG extended release tablet Take 1 tablet by mouth daily 8/2/21  Yes Astrid Dunne MD   amLODIPine (NORVASC) 5 MG tablet Take 1 tablet by mouth daily 8/2/21  Yes Astrid Dunne MD   sevelamer (RENVELA) 800 MG tablet Take 2 tablets by mouth 3 times daily (with meals) 8/1/21  Yes Astrid Dunne MD   cefdinir (OMNICEF) 300 MG capsule Take 1 capsule by mouth 2 times daily for 5 days 8/1/21 8/6/21 Yes Leonard Sanchez MD   [facility]) 500 MG tablet Take 1 tablet by mouth daily for 3 days 8/1/21 8/4/21 Yes Leonard Sanchez MD        [facility] Medications:   insulin lispro  0-6 Units Subcutaneous TID WC    insulin lispro  0-3 Units Subcutaneous Nightly    [START ON 8/4/2021] heparin (porcine)  5,000 Units Subcutaneous 3 times per day     sodium chloride, glucose, dextrose, glucagon (rDNA), dextrose   sodium chloride      dextrose      sodium chloride 1,000 mL/hr at 08/03/21 1603       Allergies:  Patient has no known allergies. Review of Systems:     A 14 ROS obtained and negative except as mentioned in HPI. · Constitutional: there has been no unanticipated weight loss. · Eyes: No visual changes or diplopia. No scleral icterus. · ENT: No Headaches, hearing loss or vertigo. No mouth sores or sore throat. · Cardiovascular: No loss of consciousness. No hemoptysis, pleuritic pain, or phlebitis. · Respiratory: No cough or wheezing, no sputum production. No hematemesis. · Gastrointestinal: No abdominal pain, appetite loss, blood in stools. No change in bowel or bladder habits. · Genitourinary: No dysuria, or hematuria. · Musculoskeletal:  No gait disturbance, weakness or joint complaints. · Integumentary: No rash or pruritis. · Neurological: No headache, diplopia,numbness or tingling. No change in gait, balance, coordination, mood, affect, memory, mentation, behavior.   · Psychiatric: No anxiety,  · Endocrine: No malaise,  · Hematologic/Lymphatic: No abnormal bruising  · Allergic/Immunologic: No nasal congestion      Physical Examination:    Vitals:    08/03/21 1628   BP: (!) 98/53   Pulse: 69   Resp: 18   Temp: 97.7 °F (36.5 °C)   SpO2: 99%              General Appearance:  Alert, cooperative, no distress, appears stated age   Head:  Normocephalic, without obvious abnormality, atraumatic   Eyes:  PERRL   Nose: Nares normal, Neck: Supple, JVP normal   Lungs:   Clear to auscultation bilaterally, respirations unlabored   Chest Wall:  No tenderness or deformity   Heart:  Regular rate and rhythm, normal S1, S2 normal, no murmur, III/VI HSM No rub. No S3 / S4 gallop   Abdomen:   Soft, non-tender, +bowel sounds   Extremities: no cyanosis, no clubbing , No edema   Pulses: Symmetric  extremities   Skin: no gross lesions or rashes   Pysch: Normal mood and affect   Neurologic: No gross deficits. CN II - XII grossly intact        Labs  CBC:   Lab Results   Component Value Date    WBC 13.7 08/03/2021    RBC 2.26 08/03/2021    HGB 7.0 08/03/2021    HCT 21.0 08/03/2021    MCV 92.6 08/03/2021    RDW 14.8 08/03/2021     08/03/2021     CMP:    Lab Results   Component Value Date     08/03/2021    K 4.8 08/03/2021    K 5.6 08/03/2021    CL 85 08/03/2021    CO2 24 08/03/2021    BUN 83 08/03/2021    CREATININE 11.2 08/03/2021    GFRAA 6 08/03/2021    LABGLOM 5 08/03/2021    GLUCOSE 201 08/03/2021    PROT 6.3 08/03/2021    CALCIUM 6.0 08/03/2021    BILITOT <0.2 08/03/2021    ALKPHOS 66 08/03/2021    AST 16 08/03/2021    ALT 15 08/03/2021     PT/INR:  No results found for: PTINR  Recent Labs     08/03/21  1143 08/03/21  1538   TROPONINI 0.37* 0.17*       EKG: Concave up ST elevation in the inferior leads with some extension into the lateral precordial leads. Echocardiogram shows normal LV systolic function. Assessment  Patient Active Problem List   Diagnosis    Metabolic acidosis    Hypotension        Impression: Troponin elevation with no chest pain and now decreasing with hydration. End-stage renal disease on hemodialysis. Syncope. Hyperkalemia. Severe anemia hemoglobin 7.0. Possible multi lobar pneumonia based on x-ray and leukocytosis    Recommendations:    I had the opportunity to review the clinical symptoms and presentation of Angela Benavidez.      I recommend that the patient undergo further cardiac evaluation with echo and

## 2021-08-03 NOTE — CONSULTS
Clinical Pharmacy Progress Note  Medication History     Admit Date: 8/3/21    List of of current medications patient is taking is complete. Source of information: Chart review (patient discharged 8/1/21).      Patient's wife Delazara Guerrar) reports all medications from prior admission were filled 8/2/21, confirmed with [facility].     Patti Cook PharmD  PGY1 Pharmacy Resident  8/3/2021 4:24 PM  [id number]

## 2021-08-03 NOTE — ED PROVIDER NOTES
[facility]          EM RESIDENT NOTE       Date of evaluation: 8/3/2021    Chief Complaint     Loss of Consciousness (patient found by family with altered LOC, less responsive, seen here recently for eye pain and abnormal labs, hadn't been to HD in a while, -CP, -SOB)      History of Present Illness     Tony Pepper is a 63 y.o. male with a Past medical history of ESRD on iHD, hypertension,? Previous valve replacement on Coumadin who presents for evaluation after being found down. Family notes that he was down for approximately 20 minutes on his porch after what he describes as a possible syncopal event. No reported prodromal symptoms. Patient was recently discharged from this facility 2 days ago after a hospitalization for emergent hemodialysis in the setting of severe hyperkalemia and 2 weeks of missed dialysis sessions. On EMS arrival, patient had a fingerstick glucose greater than 200. He was noted to be nonresponsive on EMS arrival however, became awake, alert with a significantly normalized mental status during transport. At this time, the patient complains of no pain, no shortness of breath, no nausea, no vomiting, no headache. Of note, prehospital EKG was concerning for AR depressions diffusely. Chart review is notable for documentation of rapid response event called during his dialysis session on 7/31 for tachycardia which caused his dialysis to and early. His heart rate improved after a 500 cc bolus. He was last dialyzed on 8/1/2021. Review of Systems     Review of Systems   Constitutional: Negative. Negative for activity change, appetite change, chills, diaphoresis, fatigue and fever. HENT: Negative. Negative for congestion, rhinorrhea, sore throat and trouble swallowing. Eyes: Negative. Respiratory: Negative. Negative for cough and shortness of breath. Cardiovascular: Negative. Negative for chest pain and leg swelling. Gastrointestinal: Negative. Negative for abdominal pain, nausea and vomiting. Endocrine: Negative. Genitourinary: Negative. Negative for dysuria, flank pain, hematuria and urgency. Musculoskeletal: Negative. Negative for arthralgias, back pain and myalgias. Skin: Negative. Negative for pallor, rash and wound. Neurological: Negative. Negative for dizziness, syncope, weakness, numbness and headaches. Hematological: Negative. Psychiatric/Behavioral: Negative. Negative for confusion. The patient is not nervous/anxious. Past Medical, Surgical, Family, and Social History     He has a past medical history of Chronic kidney disease and Hypertension. He has no past surgical history on file. His family history is not on file. He reports that he has quit smoking. He has never used smokeless tobacco. He reports previous alcohol use. He reports previous drug use. Medications     Previous Medications    AMLODIPINE (NORVASC) 5 MG TABLET    Take 1 tablet by mouth daily    AZITHROMYCIN (ZITHROMAX) 500 MG TABLET    Take 1 tablet by mouth daily for 3 days    CEFDINIR (OMNICEF) 300 MG CAPSULE    Take 1 capsule by mouth 2 times daily for 5 days    INSULIN GLARGINE (LANTUS) 100 UNIT/ML INJECTION VIAL    Inject 15 Units into the skin nightly    INSULIN LISPRO (HUMALOG) 100 UNIT/ML INJECTION VIAL    Inject into the skin 3 times daily (before meals) Sliding scale    METOPROLOL SUCCINATE (TOPROL XL) 25 MG EXTENDED RELEASE TABLET    Take 1 tablet by mouth daily    SEVELAMER (RENVELA) 800 MG TABLET    Take 2 tablets by mouth 3 times daily (with meals)       Allergies     He has No Known Allergies. Physical Exam     INITIAL VITALS: BP: (!) 78/56, Temp: 98.4 °F (36.9 °C), Pulse: 90, Resp: 17, SpO2: 100 %   Physical Exam  Vitals and nursing note reviewed. Constitutional:       General: He is not in acute distress. Appearance: Normal appearance. He is normal weight. He is not diaphoretic. Comments: Chronically ill appearing   HENT:      Head: Normocephalic and atraumatic. Mouth/Throat:      Mouth: Mucous membranes are moist.      Pharynx: Oropharynx is clear. Eyes:      Extraocular Movements: Extraocular movements intact. Conjunctiva/sclera: Conjunctivae normal.      Pupils: Pupils are equal, round, and reactive to light. Cardiovascular:      Rate and Rhythm: Normal rate and regular rhythm. Pulses: Normal pulses. Heart sounds: Normal heart sounds. No murmur heard. Comments: L forearm fistula with strong thrill  Pulmonary:      Effort: Pulmonary effort is normal. No respiratory distress. Breath sounds: Normal breath sounds. No wheezing, rhonchi or rales. Chest:      Chest wall: No tenderness. Abdominal:      General: Abdomen is flat. There is no distension. Palpations: Abdomen is soft. Tenderness: There is no abdominal tenderness. There is no guarding. Musculoskeletal:         General: No swelling, tenderness, deformity or signs of injury. Cervical back: Normal range of motion and neck supple. No rigidity. No muscular tenderness. Right lower leg: No edema. Left lower leg: No edema. Skin:     General: Skin is warm and dry. Capillary Refill: Capillary refill takes less than 2 seconds. Coloration: Skin is not jaundiced. Findings: No erythema or rash. Neurological:      General: No focal deficit present. Mental Status: He is alert and oriented to person, place, and time. Mental status is at baseline. Cranial Nerves: No cranial nerve deficit. Sensory: No sensory deficit. Motor: No weakness. Coordination: Coordination normal.      Gait: Gait normal.   Psychiatric:         Mood and Affect: Mood normal.         Behavior: Behavior normal.         Thought Content:  Thought content normal.         Judgment: Judgment normal.         DiagnosticResults     EKG   Interpreted in conjunction with emergencydepartment physician Digna Armas MD indication: Syncope, hypotension  Findings normal sinus rhythm at a rate of 79 bpm, intervals and axis are normal.  There are OK depressions in the lateral leads and nonspecific ST segment elevations in the inferior leads which are new compared to prior available in the [facility] system from approximately 1 month ago. There are no reciprocal depressions appreciated. Impression: Normal sinus rhythm with nonspecific inferior ST segment changes and lateral OK depressions. Repeat EKG at 1331 obtained for sequential follow-up does note normal sinus rhythm, again with normal intervals and axis. It is largely unchanged from prior however ST segment changes in the inferior leads are still present. RADIOLOGY:  CT HEAD WO CONTRAST   Final Result      No acute intracranial pathology        Atrophy and ischemic leukoencephalopathy         XR CHEST PORTABLE   Final Result      1. Bilateral airspace disease stable.                 LABS:   Results for orders placed or performed during the hospital encounter of 08/03/21   CBC auto differential   Result Value Ref Range    WBC 13.7 (H) 4.0 - 11.0 K/uL    RBC 2.26 (L) 4.20 - 5.90 M/uL    Hemoglobin 7.0 (L) 13.5 - 17.5 g/dL    Hematocrit 21.0 (L) 40.5 - 52.5 %    MCV 92.6 80.0 - 100.0 fL    MCH 31.1 26.0 - 34.0 pg    MCHC 33.6 31.0 - 36.0 g/dL    RDW 14.8 12.4 - 15.4 %    Platelets 838 374 - 771 K/uL    MPV 8.6 5.0 - 10.5 fL    Neutrophils % 81.8 %    Lymphocytes % 8.4 %    Monocytes % 6.7 %    Eosinophils % 2.6 %    Basophils % 0.5 %    Neutrophils Absolute 11.2 (H) 1.7 - 7.7 K/uL    Lymphocytes Absolute 1.2 1.0 - 5.1 K/uL    Monocytes Absolute 0.9 0.0 - 1.3 K/uL    Eosinophils Absolute 0.4 0.0 - 0.6 K/uL    Basophils Absolute 0.1 0.0 - 0.2 K/uL   Basic Metabolic Panel w/ Reflex to MG   Result Value Ref Range    Sodium 137 136 - 145 mmol/L    Potassium reflex Magnesium 5.6 (H) 3.5 - 5.1 mmol/L    Chloride 90 (L) 99 - 110 mmol/L   Blood gas, venous (Lab)   Result Value Ref Range    pH, Brandon 7.431 7.350 - 7.450    pCO2, Brandon 48.5 41.0 - 51.0 mmHg    pO2, Brandon 47.3 (H) 25 - 40 mmHg    HCO3, Venous 32.2 (H) 24.0 - 28.0 mmol/L    Base Excess, Brandon 7.1 (H) -2.0 - 3.0 mmol/L    O2 Sat, Brandon 82 Not established %    Carboxyhemoglobin 3.9 (H) 0.0 - 1.5 %    MetHgb, Brandon 0.4 0.0 - 1.5 %    TC02 (Calc), Brandon 34 mmol/L    Hemoglobin, Brandon, Reduced 17.60 %   Troponin   Result Value Ref Range    Troponin 0.37 (H) <0.01 ng/mL   Ammonia   Result Value Ref Range    Ammonia 12 (L) 16 - 60 umol/L   PT - INR   Result Value Ref Range    Protime 15.0 (H) 9.9 - 12.7 sec    INR 1.31 (H) 0.88 - 1.12   Hepatic function panel (LFTs)   Result Value Ref Range    Albumin 3.0 (L) 3.4 - 5.0 g/dL    Alkaline Phosphatase 66 40 - 129 U/L    ALT 15 10 - 40 U/L   POCT Glucose   Result Value Ref Range    POC Glucose 200 (H) 70 - 99 mg/dl    Performed on ACCU-CHEK    EKG 12 Lead   Result Value Ref Range    Ventricular Rate 79 BPM    Atrial Rate 79 BPM    P-R Interval 122 ms    QRS Duration 104 ms    Q-T Interval 438 ms    QTc Calculation (Bazett) 502 ms    P Axis 30 degrees    R Axis 43 degrees    T Axis 79 degrees    Diagnosis       EKG performed in ER and to be interpreted by ER physician. Confirmed by MD, ER ([id number]),  [address] [facility], [address] [place] [address] ([id number]) on 8/3/2021 1:21:07 PM       ED BEDSIDE ULTRASOUND:  Bedside echocardiogram performed with observed cardiac activity, small pericardial effusion with no evidence of tamponade, overall preserved ejection fraction with no apparent regional wall motion abnormalities. RECENT VITALS:  BP: (!) 81/53, Temp: 98.4 °F (36.9 °C), Pulse: 71,Resp: 15, SpO2: 92 %     Procedures     ED Course     Nursing Notes, Past Medical Hx, Past Surgical Hx, Social Hx, Allergies, and Family Hx were reviewed.     The patient was given the followingmedications:  Orders Placed This Encounter   Medications    lactated ringers bolus    aspirin tablet 325 mg    cefTRIAXone (ROCEPHIN) 1000 mg IVPB in 50 mL D5W minibag     Order Specific Question:   Antimicrobial Indications     Answer:   Pneumonia (CAP)     Order Specific Question:   Antimicrobial Indications     Answer:   Pneumonia (HAP)    vancomycin (VANCOCIN) 1,000 mg in dextrose 5 % 250 mL IVPB     Order Specific Question:   Antimicrobial Indications     Answer:   Pneumonia (HAP)    DISCONTD: lactated ringers bolus    0.9 % sodium chloride infusion    sodium zirconium cyclosilicate (LOKELMA) oral suspension 10 g       CONSULTS:  IP CONSULT TO CARDIOLOGY  IP CONSULT TO CRITICAL CARE  IP CONSULT TO HOSPITALIST  IP CONSULT TO HOSPITALIST    MEDICAL DECISION MAKING / ASSESSMENT / Anthony Hector is a 63 y.o. male with past medical history of ESRD on hemodialysis who presents after being found down due to probable syncopal event. Based on prehospital notification and prehospital EKG, the patient was triaged to the trauma bay and repeat EKG immediately performed which continues to show mildly depressed ME segments diffusely as well as nonspecific ST segment elevations with an overall reassuring morphology in the inferior leads. As the patient is not currently complaining of any chest pain, shortness of breath and bedside echocardiogram reveals no evidence of significantly decreased systolic function or evidence of abnormal wall motion, the Cath Lab was not activated however the EKG was discussed with the interventional cardiologist who feels that the patient may need further cardiac for stratification during this hospitalization, he does not need emergent angiography. ED Course as of Aug 03 1441   Tue Aug 03, 2021   1201  VBG noted likely indicates a chronic, well compensated metabolic alkalosis, likely secondary to his known renal failure. CBC demonstrates increased leukocytosis compared to most recent values from 3 days prior however, the remainder of the CBC is otherwise unchanged.     [OU]      ED Course User Index  [OU] Chloe Cortes MD     Given his hypotension, patient will receive 1 unit of blood for his hemoglobin of 7.0 as it is possible he is having symptomatic anemia. Additionally, although the patient has no symptoms consistent with underlying infectious process, seeing that he is currently on cefdinir for treatment of a possible community-acquired pneumonia, I elected to administer broad-spectrum antibiotics. Given that he is a dialysis dependent patient, and I do not feel that the patient is septic at this time, I did not resuscitate him with 30 cc/kg of IV fluids. It is possible that some component of this is secondary to recent aggressive dialysis in the setting of volume overload and initiation on a antihypertensive medication, Toprol-XL, with his first dose being administered last night however, given that he is not bradycardic and has no other signs of severe beta-blockade, this is overall felt to be unlikely. BMP is notable for mild hyperkalemia at 5.6 with no evident EKG changes; per nephrology, [facility]  Additionally, with a mildly elevated troponin and concern for EKG changes, the patient did receive aspirin. At this time, the patient will be admitted to the ICU for refractory, cryptogenic hypotension and syncope. This patient was also evaluated by the attending physician. All care plans were discussed and agreed upon. Clinical Impression     1. Syncope and collapse    2. Anemia, unspecified type    3. Dizziness        Disposition     PATIENT REFERRED TO:  No follow-up provider specified.     DISCHARGE MEDICATIONS:  New Prescriptions    No medications on file     DISPOSITION Admitted 08/03/2021 01:59:29 PM         Chloe Cortes MD  Resident  08/03/21 [id number]

## 2021-08-03 NOTE — H&P
[facility] Day: 0  ICU Day: 0                                                         Code:Prior  Admit Date: 8/3/2021  PCP: No primary care provider on file. CC: Dizziness, Syncope     HISTORY OF PRESENT ILLNESS:   63year old male with ESRD on hemodialysis T/T/S, HTN who presented to the ED complaining of dizziness. Patient was recently admitted to [facility] on 7/30/2021, after he had missed 3 sessions of dialysis and had complaint of shortness of breath. During that time dialysis was attempted on 7/31/2021 however a rapid was called because patient had gone into atrial fibrillation with RVR, then was given Lopressor 2.5 mg IV and became hypotensive to the 70V-83Q systolic. Patient reports that he was discharged on new hypertension meds (Toprolol) and antibiotics (cefdinir and azithromycin) for a pneumonia and he had taken his first dose last night. After taking that first dose of all medications he began feeling very dizzy and lightheaded. He then went to bed. Patient woke up feeling not like his normal self. He had a cup of coffee and then sat on a chair. His sister had arrived at his house to take him to dialysis when he was found slumped on the chair. It was noted that patient had been unresponsive during that time. Last known well was 15 minutes prior to episode. Had been unresponsive for approximately 30 minutes and became responsive whilst in the ambulance. Patient was seen in the ED and was noted to be hypotensive. An EKG was done which revealed nonspecific ST segment elevations in the inferior leads. BMP done was remarkable for hyperkalemia of 5.6. He also had a mildly elevated troponin of 0.37 and received aspirin for same because of ECG changes. CT head w/o contrast with no intracranial pathology. CXR with bilateral airspace disease-stable. When patient was seen he was fully awake and alert.   He denied any chest pain, shortness of breath, palpitations, abdominal pain, nausea or vomiting. He also reports that he cannot recall what happened during the time he had that syncopal episode. PAST HISTORY:     Past Medical History:   Diagnosis Date    Chronic kidney disease     Hypertension        No past surgical history on file. SocialHistory:   The patient lives at home with his wife    Alcohol: Nil usage  Illicit drugs: no use  Tobacco:  Smokes 1/2 pack of cigarretes/ day x 20 years    Family History:  No family history on file. MEDICATIONS:     No current facility-administered medications on file prior to encounter. Current Outpatient Medications on File Prior to Encounter   Medication Sig Dispense Refill    insulin glargine (LANTUS) 100 UNIT/ML injection vial Inject 15 Units into the skin nightly      insulin lispro (HUMALOG) 100 UNIT/ML injection vial Inject into the skin 3 times daily (before meals) Sliding scale      metoprolol succinate (TOPROL XL) 25 MG extended release tablet Take 1 tablet by mouth daily 30 tablet 3    amLODIPine (NORVASC) 5 MG tablet Take 1 tablet by mouth daily 30 tablet 3    sevelamer (RENVELA) 800 MG tablet Take 2 tablets by mouth 3 times daily (with meals) 90 tablet 3    cefdinir (OMNICEF) 300 MG capsule Take 1 capsule by mouth 2 times daily for 5 days 10 capsule 0    azithromycin (ZITHROMAX) 500 MG tablet Take 1 tablet by mouth daily for 3 days 3 tablet 0         Scheduled Meds:   vancomycin  1,000 mg Intravenous Once    sodium zirconium cyclosilicate  10 g Oral Once    dextrose  25 g Intravenous Once    insulin regular  10 Units Intravenous Once      Continuous Infusions:   sodium chloride       PRN Meds:sodium chloride    Allergies: No Known Allergies    REVIEW OF SYSTEMS:         Review of Systems   Constitutional: Negative for diaphoresis and fatigue. Respiratory: Negative for apnea, cough, chest tightness, shortness of breath and wheezing.     Cardiovascular: Negative for chest pain, palpitations and leg swelling. Gastrointestinal: Negative for abdominal pain, nausea and vomiting. Neurological: Positive for dizziness, syncope and light-headedness. PHYSICAL EXAM:       Vitals: BP (!) 92/59   Pulse 69   Temp 98.4 °F (36.9 °C) (Oral)   Resp 11   SpO2 100%     I/O:      Intake/Output Summary (Last 24 hours) at 8/3/2021 1453  Last data filed at 8/3/2021 1335  Gross per 24 hour   Intake 500 ml   Output --   Net 500 ml     I/O this shift: In: 500 [IV Piggyback:500]  Out: -   No intake/output data recorded. Physical Examination:     Physical Exam  Eyes:      Conjunctiva/sclera: Conjunctivae normal.      Pupils: Pupils are equal, round, and reactive to light. Cardiovascular:      Rate and Rhythm: Normal rate and regular rhythm. Pulses: Normal pulses. Heart sounds: Murmur (Holosystolic murmur) heard. Pulmonary:      Effort: Pulmonary effort is normal.      Breath sounds: Normal breath sounds. No wheezing, rhonchi or rales. Abdominal:      General: Abdomen is flat. Bowel sounds are normal. There is no distension. Palpations: Abdomen is soft. Tenderness: There is no abdominal tenderness. There is no guarding. Musculoskeletal:      Comments: HD fistula in situ on left upper extremity with surrounding ecchymosis   Skin:     General: Skin is warm. Capillary Refill: Capillary refill takes less than 2 seconds. Neurological:      General: No focal deficit present. Mental Status: He is oriented to person, place, and time. Cranial Nerves: No cranial nerve deficit. Sensory: No sensory deficit. Motor: No weakness. No results for input(s): PHART, FTB3UWV, PO2ART in the last 72 hours.         DATA:       Labs:  CBC:   Recent Labs     08/01/21  0452 08/03/21  1143   WBC 10.5 13.7*   HGB 7.2* 7.0*   HCT 20.7* 21.0*    429       BMP:   Recent Labs     08/01/21  0452 08/03/21  1143    137   K 4.7 5.6*   CL 96* 90* reveals mitral annular calcification with mild to moderate mitral regurgitation. Mild aortic stenosis. Mild aortic regurgitation. Mild tricuspid regurgitation. Severely dilated left atrium. There is also a small circumferential pericardial effusion. End-stage renal disease on HD   Patient on hemodialysis T/T/S but not compliant. Was due for hemodialysis today. -Nephrology following and will continue RRT once in ICU    Anemia of Chronic disease   Patient is a known end-stage renal disease on dialysis. -Type and crossmatch 1 unit PRBC for transfusion   - H/H post transfusion    Hyperkalemia   Potassium of 5.6   - Received 10 units of Novolin R with dextrose 50% IV solution given for hyperkalemia   - Received Lokelma oral suspension in the ED. ? Sepsis  + Leukocytosis: 13.7, however patient is not febrile he is not tachycardic and he is not tachypneic. SIRS 1/4. Patient is currently being treated for pneumonia initial medication on discharge with cefdinir patient received ceftriaxone and vancomycin in the ED. - Blood cultures taken   - lactic acid normal   - Procalcitonin elevated    ? Pneumonia   Patient discharged from hospital 2 days ago and discharged on antibiotics cefdinir and azithromycin. Patient switched to different antibiotics ceftriaxone in the ED to continue on admission. Procal: 1.98   Chest x-ray with bilateral airspace disease stable. Chest physical examination unremarkable. -  Chest CT    - Ceftrioxone 1g OD   - Vancomycin     Pharmacy consulted for vancomycin dosing.    - Blood cultures done in ED. Pending     Hypertension. Patient is currently hypotensive    - Hold Home meds    H/o Atrial fibrillation    Pt had an episode of a fib whilst on dialysis 3 days ago. Current EKG not a.fib.    - Review repeat EKGs       ? Diabetes mellitus   Patient not known to be diabetic however does present with elevated glucose.     - HbA1c   - POCT Gluciose    - LDSSI   - Hypoglycemia protocol      Code Status: FULL    FEN: DM diet  PPX: Heparin  DISPO: ICU    This patient has been staffed and discussed with Eulalio Hu MD  -----------------------------  Jeremiah Swanson MD, PGY-1  Internal medicine resident  Perfect serve  8/3/2021  2:53 PM

## 2021-08-04 VITALS
HEART RATE: 89 BPM | HEIGHT: 68 IN | TEMPERATURE: 98.2 F | DIASTOLIC BLOOD PRESSURE: 91 MMHG | BODY MASS INDEX: 26.98 KG/M2 | RESPIRATION RATE: 22 BRPM | SYSTOLIC BLOOD PRESSURE: 170 MMHG | WEIGHT: 178 LBS | OXYGEN SATURATION: 98 %

## 2021-08-04 LAB
ANION GAP SERPL CALCULATED.3IONS-SCNC: 12 MMOL/L (ref 3–16)
BASOPHILS ABSOLUTE: 0 K/UL (ref 0–0.2)
BASOPHILS RELATIVE PERCENT: 0.2 %
BLOOD BANK DISPENSE STATUS: NORMAL
BLOOD BANK PRODUCT CODE: NORMAL
BPU ID: NORMAL
BUN BLDV-MCNC: 48 MG/DL (ref 7–20)
CALCIUM SERPL-MCNC: 7.2 MG/DL (ref 8.3–10.6)
CHLORIDE BLD-SCNC: 100 MMOL/L (ref 99–110)
CO2: 25 MMOL/L (ref 21–32)
CREAT SERPL-MCNC: 7.4 MG/DL (ref 0.9–1.3)
DESCRIPTION BLOOD BANK: NORMAL
EKG ATRIAL RATE: 79 BPM
EKG ATRIAL RATE: 86 BPM
EKG DIAGNOSIS: NORMAL
EKG DIAGNOSIS: NORMAL
EKG P AXIS: 64 DEGREES
EKG P AXIS: 83 DEGREES
EKG P-R INTERVAL: 138 MS
EKG P-R INTERVAL: 148 MS
EKG Q-T INTERVAL: 388 MS
EKG Q-T INTERVAL: 406 MS
EKG QRS DURATION: 102 MS
EKG QRS DURATION: 92 MS
EKG QTC CALCULATION (BAZETT): 464 MS
EKG QTC CALCULATION (BAZETT): 465 MS
EKG R AXIS: 42 DEGREES
EKG R AXIS: 58 DEGREES
EKG T AXIS: 104 DEGREES
EKG T AXIS: 82 DEGREES
EKG VENTRICULAR RATE: 79 BPM
EKG VENTRICULAR RATE: 86 BPM
EOSINOPHILS ABSOLUTE: 0.4 K/UL (ref 0–0.6)
EOSINOPHILS RELATIVE PERCENT: 2.7 %
GFR AFRICAN AMERICAN: 9
GFR NON-AFRICAN AMERICAN: 8
GLUCOSE BLD-MCNC: 132 MG/DL (ref 70–99)
GLUCOSE BLD-MCNC: 148 MG/DL (ref 70–99)
GLUCOSE BLD-MCNC: 149 MG/DL (ref 70–99)
GLUCOSE BLD-MCNC: 228 MG/DL (ref 70–99)
HCT VFR BLD CALC: 23 % (ref 40.5–52.5)
HCT VFR BLD CALC: 24.7 % (ref 40.5–52.5)
HEMOGLOBIN: 7.7 G/DL (ref 13.5–17.5)
HEMOGLOBIN: 8.5 G/DL (ref 13.5–17.5)
LYMPHOCYTES ABSOLUTE: 1.2 K/UL (ref 1–5.1)
LYMPHOCYTES RELATIVE PERCENT: 8.2 %
MAGNESIUM: 1.9 MG/DL (ref 1.8–2.4)
MCH RBC QN AUTO: 30.7 PG (ref 26–34)
MCHC RBC AUTO-ENTMCNC: 33.4 G/DL (ref 31–36)
MCV RBC AUTO: 91.9 FL (ref 80–100)
MONOCYTES ABSOLUTE: 1.1 K/UL (ref 0–1.3)
MONOCYTES RELATIVE PERCENT: 8 %
NEUTROPHILS ABSOLUTE: 11.4 K/UL (ref 1.7–7.7)
NEUTROPHILS RELATIVE PERCENT: 80.9 %
PDW BLD-RTO: 15.5 % (ref 12.4–15.4)
PERFORMED ON: ABNORMAL
PLATELET # BLD: 358 K/UL (ref 135–450)
PMV BLD AUTO: 8.3 FL (ref 5–10.5)
POTASSIUM SERPL-SCNC: 4.5 MMOL/L (ref 3.5–5.1)
RBC # BLD: 2.51 M/UL (ref 4.2–5.9)
SEDIMENTATION RATE, ERYTHROCYTE: 99 MM/HR (ref 0–20)
SODIUM BLD-SCNC: 137 MMOL/L (ref 136–145)
TROPONIN: 0.32 NG/ML
TSH REFLEX: 1.35 UIU/ML (ref 0.27–4.2)
VANCOMYCIN RANDOM: 11 UG/ML
WBC # BLD: 14.1 K/UL (ref 4–11)

## 2021-08-04 PROCEDURE — 36415 COLL VENOUS BLD VENIPUNCTURE: CPT

## 2021-08-04 PROCEDURE — 80202 ASSAY OF VANCOMYCIN: CPT

## 2021-08-04 PROCEDURE — 6370000000 HC RX 637 (ALT 250 FOR IP): Performed by: STUDENT IN AN ORGANIZED HEALTH CARE EDUCATION/TRAINING PROGRAM

## 2021-08-04 PROCEDURE — 83735 ASSAY OF MAGNESIUM: CPT

## 2021-08-04 PROCEDURE — 6360000002 HC RX W HCPCS: Performed by: STUDENT IN AN ORGANIZED HEALTH CARE EDUCATION/TRAINING PROGRAM

## 2021-08-04 PROCEDURE — 84484 ASSAY OF TROPONIN QUANT: CPT

## 2021-08-04 PROCEDURE — 85652 RBC SED RATE AUTOMATED: CPT

## 2021-08-04 PROCEDURE — 85018 HEMOGLOBIN: CPT

## 2021-08-04 PROCEDURE — 85025 COMPLETE CBC W/AUTO DIFF WBC: CPT

## 2021-08-04 PROCEDURE — 2580000003 HC RX 258: Performed by: STUDENT IN AN ORGANIZED HEALTH CARE EDUCATION/TRAINING PROGRAM

## 2021-08-04 PROCEDURE — 93005 ELECTROCARDIOGRAM TRACING: CPT | Performed by: INTERNAL MEDICINE

## 2021-08-04 PROCEDURE — 83036 HEMOGLOBIN GLYCOSYLATED A1C: CPT

## 2021-08-04 PROCEDURE — 36430 TRANSFUSION BLD/BLD COMPNT: CPT

## 2021-08-04 PROCEDURE — 99233 SBSQ HOSP IP/OBS HIGH 50: CPT | Performed by: INTERNAL MEDICINE

## 2021-08-04 PROCEDURE — 85014 HEMATOCRIT: CPT

## 2021-08-04 PROCEDURE — 93005 ELECTROCARDIOGRAM TRACING: CPT | Performed by: STUDENT IN AN ORGANIZED HEALTH CARE EDUCATION/TRAINING PROGRAM

## 2021-08-04 PROCEDURE — 93010 ELECTROCARDIOGRAM REPORT: CPT | Performed by: INTERNAL MEDICINE

## 2021-08-04 PROCEDURE — 99223 1ST HOSP IP/OBS HIGH 75: CPT | Performed by: INTERNAL MEDICINE

## 2021-08-04 PROCEDURE — 80048 BASIC METABOLIC PNL TOTAL CA: CPT

## 2021-08-04 RX ORDER — CEFDINIR 300 MG/1
300 CAPSULE ORAL EVERY 12 HOURS SCHEDULED
Status: DISCONTINUED | OUTPATIENT
Start: 2021-08-04 | End: 2021-08-04 | Stop reason: HOSPADM

## 2021-08-04 RX ORDER — DOBUTAMINE HYDROCHLORIDE 200 MG/100ML
10 INJECTION INTRAVENOUS CONTINUOUS
Status: DISCONTINUED | OUTPATIENT
Start: 2021-08-04 | End: 2021-08-04

## 2021-08-04 RX ORDER — SODIUM CHLORIDE 9 MG/ML
INJECTION, SOLUTION INTRAVENOUS PRN
Status: DISCONTINUED | OUTPATIENT
Start: 2021-08-04 | End: 2021-08-04 | Stop reason: HOSPADM

## 2021-08-04 RX ADMIN — Medication 10 ML: at 08:41

## 2021-08-04 RX ADMIN — INSULIN LISPRO 2 UNITS: 100 INJECTION, SOLUTION INTRAVENOUS; SUBCUTANEOUS at 12:16

## 2021-08-04 RX ADMIN — HEPARIN SODIUM 5000 UNITS: 5000 INJECTION INTRAVENOUS; SUBCUTANEOUS at 15:30

## 2021-08-04 RX ADMIN — METOPROLOL TARTRATE 12.5 MG: 25 TABLET, FILM COATED ORAL at 12:37

## 2021-08-04 RX ADMIN — HEPARIN SODIUM 5000 UNITS: 5000 INJECTION INTRAVENOUS; SUBCUTANEOUS at 06:18

## 2021-08-04 RX ADMIN — VANCOMYCIN HYDROCHLORIDE 500 MG: 10 INJECTION, POWDER, LYOPHILIZED, FOR SOLUTION INTRAVENOUS at 08:40

## 2021-08-04 RX ADMIN — EPOETIN ALFA-EPBX 10000 UNITS: 10000 INJECTION, SOLUTION INTRAVENOUS; SUBCUTANEOUS at 12:17

## 2021-08-04 ASSESSMENT — PAIN SCALES - GENERAL
PAINLEVEL_OUTOF10: 0

## 2021-08-04 ASSESSMENT — ENCOUNTER SYMPTOMS
ABDOMINAL PAIN: 0
SHORTNESS OF BREATH: 0
CHEST TIGHTNESS: 0
WHEEZING: 0
COUGH: 0

## 2021-08-04 NOTE — PROGRESS NOTES
ICU Progress Note    Admit Date: 8/3/2021  Day: 1  Vent Day: None  IV Access:Peripheral  IV Fluids:None  Vasopressors:None                Antibiotics: Vanc, rocephin  Diet: Diet NPO    CC: Syncope    Interval history:   Pt reports feeling fine. Nil dizziness or lightheadedness. Still denies any chest pain, SOB, dyspnea, palpitations. Only c/o hunger. Trop I 0.37 -> 0.20 -> 0.32. (elevated this morning) and still having serial EKGs  Pro BNP: >30628  Received hemodialysis yesterday.        Medications:     Scheduled Meds:   sodium chloride flush  5-40 mL Intravenous 2 times per day    insulin lispro  0-6 Units Subcutaneous TID WC    insulin lispro  0-3 Units Subcutaneous Nightly    heparin (porcine)  5,000 Units Subcutaneous 3 times per day    cefTRIAXone (ROCEPHIN) IV  1,000 mg Intravenous Q24H    vancomycin (VANCOCIN) intermittent dosing (placeholder)   Other See Admin Instructions     Continuous Infusions:   sodium chloride      sodium chloride      dextrose       PRN Meds:sodium chloride, sodium chloride flush, sodium chloride, ondansetron **OR** ondansetron, polyethylene glycol, acetaminophen **OR** acetaminophen, glucose, dextrose, glucagon (rDNA), dextrose    Objective:   Vitals:   T-max:  Patient Vitals for the past 8 hrs:   BP Temp Temp src Pulse Resp SpO2 Weight   08/04/21 0500 (!) 173/88 -- -- 78 14 -- --   08/04/21 0400 (!) 141/67 -- -- 82 16 -- --   08/04/21 0300 (!) 149/71 97.8 °F (36.6 °C) Axillary 85 19 100 % --   08/04/21 0200 136/62 -- -- 83 -- -- --   08/04/21 0100 (!) 150/80 -- -- 79 14 -- --   08/04/21 0000 (!) 144/66 98.2 °F (36.8 °C) -- 83 12 -- 178 lb 9.2 oz (81 kg)   08/03/21 2300 126/60 -- -- 74 14 -- --   08/03/21 2200 119/68 -- -- 72 15 98 % --       Intake/Output Summary (Last 24 hours) at 8/4/2021 0543  Last data filed at 8/4/2021 0400  Gross per 24 hour   Intake 4340 ml   Output 1174 ml   Net 3166 ml       Review of Systems   Respiratory: Negative for cough, chest tightness, shortness of breath and wheezing. Cardiovascular: Negative for chest pain and palpitations. Gastrointestinal: Negative for abdominal pain. Neurological: Negative for dizziness, weakness, light-headedness and headaches. Physical Exam  Constitutional:       Comments: Lying in bed in semi-Fowlers position   HENT:      Mouth/Throat:      Mouth: Mucous membranes are moist.   Eyes:      Pupils: Pupils are equal, round, and reactive to light. Cardiovascular:      Rate and Rhythm: Normal rate. Rhythm irregular. Heart sounds: Murmur heard. Pulmonary:      Effort: Pulmonary effort is normal.      Breath sounds: Rales (left lower lung base) present. Abdominal:      General: Abdomen is flat. Bowel sounds are normal. There is no distension. Palpations: Abdomen is soft. Tenderness: There is no abdominal tenderness. There is no guarding. Skin:     General: Skin is warm. Capillary Refill: Capillary refill takes less than 2 seconds. Neurological:      General: No focal deficit present. Mental Status: He is alert and oriented to person, place, and time. LABS:    CBC:   Recent Labs     08/03/21  1141 08/03/21  1143 08/04/21  0414   WBC  --  13.7* 14.1*   HGB  --  7.0* 7.7*   HCT 20.8* 21.0* 23.0*   PLT  --  429 358   MCV  --  92.6 91.9     Renal:    Recent Labs     08/03/21  1143 08/03/21  1143 08/03/21  1538 08/03/21  1631 08/04/21  0414     --  132*  --  137   K 5.6*  --  4.8  --  4.5   CL 90*  --  85*  --  100   CO2 27  --  24  --  25   BUN 96*  --  83*  --  48*   CREATININE 12.8*  --  11.2*  --  7.4*   GLUCOSE 174*   < > 738* 201 149*   CALCIUM 7.2*  --  6.0*  --  7.2*   MG 2.20  --   --   --  1.90   ANIONGAP 20*  --  23*  --  12    < > = values in this interval not displayed.      Hepatic:   Recent Labs     08/03/21  1143   AST 16   ALT 15   BILITOT <0.2   BILIDIR <0.2   PROT 6.3*   LABALBU 3.0*   ALKPHOS 66     Troponin:   Recent Labs     08/03/21  1530 08/03/21 2122 08/04/21  0414   TROPONINI 0.17* 0.20* 0.32*     BNP: No results for input(s): BNP in the last 72 hours. Lipids: No results for input(s): CHOL, HDL in the last 72 hours. Invalid input(s): LDLCALCU, TRIGLYCERIDE  ABGs:  No results for input(s): PHART, EHO4YQB, PO2ART, ZWW9UNW, BEART, THGBART, R9FSAQKO, VKS2NKI in the last 72 hours. INR:   Recent Labs     08/03/21  1143   INR 1.31*     Lactate: No results for input(s): LACTATE in the last 72 hours. Cultures:  -----------------------------------------------------------------  RAD:   CT HEAD WO CONTRAST   Final Result      No acute intracranial pathology        Atrophy and ischemic leukoencephalopathy         XR CHEST PORTABLE   Final Result      1. Bilateral airspace disease stable. Assessment/Plan:   Nathanael Flores is a 63 y.o. male with a past medical history of ESRD on HD and HTN, who complained of dizziness and lightheadedness and had a syncopal episode.        Syncope  ? Inferior wall STEMI  Patient initial complaint is lightheadedness and dizziness with a syncopal episode. ECG done revealed ST elevations in lead II, III and aVF. Although patient denies chest pain, shortness of breath, diaphoresis or palpitations, EKG had significant changes. Syncope likely cardiogenic in etiology  Troponin 0.37 -> 0.17 -> 0.20 -> 0.32  Pt continues to deny chest pain, SOB, dyspnea, Palpitations. Cardiology has advised that it is okay to discontinue troponin trending. Patient a bit emotional about home factors which involves his wife being in left unattended. Because patient has anemia of chronic disease last hemoglobin is 7.7 we will attempt to transfuse 1 more unit of PRBC.    -Discontinue troponin trend   - tox screen   - Cardiology to schedule med stress test.      Hypotension  Patient had taken first dose of Toprolol overnight.   Query whether hypotensive episode due to beta-blocker however patient only had decreased blood pressure and noted decreased heart rate. Query inferior infarct causing hypotension. Patient received fluid bolus in ED and blood pressures have improved. Now hypertensive in ICU.          HFpEF- diastolic  EF: 93%  Echo done reveals mitral annular calcification with mild to moderate mitral regurgitation. Mild aortic stenosis. Mild aortic regurgitation. Mild tricuspid regurgitation. Severely dilated left atrium. There is also a small circumferential pericardial effusion. Pro BNP: > 29529   - Cardiology following.      End-stage renal disease on HD  Hypocalcemia   Received HD last night.    - Nephrology following. Anemia of Chronic disease               Patient is a known end-stage renal disease on dialysis. Had 1 unit PRBC transfused 8/3. Hb: 7.7 (stable)    -Transfuse 1 unit PRBC    Hyperkalemia  Received 10 units of Novolin R with dextrose 50% IV solution and  Lokelma oral suspension in the ED yesterday. Also had HD last night. K: 4.5 -stable                      Pneumonia              Patient discharged from hospital 2 days ago and discharged on antibiotics cefdinir and azithromycin. Patient switched to different antibiotics ceftriaxone in the ED to continue on admission. Procal: 1.98   Chest x-ray with bilateral airspace disease stable. Chest physical examination unremarkable. Procal 2.71 -> 1.98              - Discontinue vancomycin and ceftriaxone   -Switch patient to previous home med of cefdinir      ? Sepsis                This is unlikely sepsis although patient was being treated for pneumonia. There is a component of leukocytosis WBC 14.1. Patient only meets 1 out of 4 and SIRS criteria. Hypertension. BP elevated: 156/79    -Lopressor 12.5 mg p.o. twice daily     To observe patient and how he reacts to taking metoprolol. H/o Atrial fibrillation                   Pt had an episode of a fib whilst on dialysis 3 days ago. Current EKG not a.fib. -Lopressor 12.5 mg p.o. twice daily                   Diabetes mellitus  Glucose levels elevated   Glucose level yesterday of 700 likely a sampling site error since pt was receiving D50 with insuline for hyperK. Glucose levels have been 200s and less. - HbA1c              - POCT Gluciose               - LDSSI              - Hypoglycemia protocol    We will observe how he does throughout the day after beginning Lopressor 12.5 mg p.o. Cardiology may also decide whether they are doing med stress test today. If patient maintains stability may be a candidate for discharge today or tomorrow.      Code Status: Full  FEN: Regular  PPX: Heparin  DISPO: ICU    Rosie Lemon MD, PGY-1  08/04/21  5:43 AM    This patient has been staffed and discussed with Geni Garcia MD

## 2021-08-04 NOTE — PROGRESS NOTES
Assessment complete/ pt wants to eat/ wants a diet ordered/ Bacilio Mckeon RN relayed to ICU Residents per hand off / pending order/ see notes/ flow sheet

## 2021-08-04 NOTE — PROGRESS NOTES
Nephrology Progress Note        [facility]    [email or web address]       Phone: [phone]                                                  8/4/2021 8:46 AM     Patient: Travis Steiner [id number]  [id number]  Date of Admit: 8/3/2021 LOS: 1 days  Referring physician:      Assessment & Plan     Renal function:  ESRD  On HD TTS schedule. 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 Lab Results   Component Value Date    .5 (H) 07/31/2021    CALCIUM 7.2 (L) 08/04/2021    PHOS 4.6 07/31/2021         Acid/Base status:  Stable. Volume status/BP:  Patient was hypotensive in 70 s range in ER which improved with IVF and PRBC and now BP is high. On Abx and Cx pending    Hematology:   Lab Results   Component Value Date    IRON 74 07/31/2021    TIBC 131 (L) 07/31/2021    FERRITIN 893.4 (H) 07/31/2021     Lab Results   Component Value Date    WBC 14.1 (H) 08/04/2021    HGB 7.7 (L) 08/04/2021    HCT 23.0 (L) 08/04/2021    MCV 91.9 08/04/2021     08/04/2021     CKD related anemia:   Goal Hb 10-11. Transfused 1 PRBC yesterday  Start GABRIELA          Plan:  No urgent indication of RRT  Will plan for HD in AM.   Start GABRIELA  If BP remains elevated, can add low dose Nifedipine 30 mg ER daily and monitor  Monitor closely  Low K diet  Fluid restriction. Avoid nephrotoxins  Monitor input, output and daily weight  Renally dose all medications  Advised patient for observation here as Cx are pending and his BP is not yet stable and need monitoring.      Thank you for allowing us to participate in this patient's care    In case of any question please call us at our 24 hour answering service [phone] or from 7 AM to 5 PM via [address] [place] [address] or [employer] number    Sundeep Marte MD  [facility]  [facility] [address]  [place], [place]  Fax: [phone]  Office: [phone]               Reason for Consult and Chief Complaint     Reason for consult: ESRD    Chief complaint:   Chief Complaint   Patient presents with    Loss of Consciousness     patient found by family with altered LOC, less responsive, seen here recently for eye pain and abnormal labs, hadn't been to HD in a while, -CP, -SOB       History of Present Illness   Maliha Gore is a 63 y.o. with PMH significant for ESRD on HD,  HTN admitted after being found down at home. CT head no acute IC process. CXR showed stable B/L air space disease. Labs showing K of 5.6, mild elevated troponin, leukocytosis with anemia. VBG PH s 7.4. Patient is hypotensive in ER. Patient seen in ER and he is accompanied by his sister in ER and patient is comfortable and oriented x 3. Patient was feeling fine after discharge and today was his dialysis day and his sister was coming to pick him up from his home but he was not feeling well. Later there is  Report of passing out for uncertain duration. Patient is hypotensive in ER and he feels fine. Patient do not feel SOB and he do not feel that he is volume overloaded. Patient denies any chest pain or pain anywhere else. No bleeding. Hb is stable as before although it is low in 7 range. No fever, cough. Patient has leukocytosis. No GI symptoms. Patient is on oxygen while in ER. Patient do not use oxygen at home. Patient's sister is concerned that he might took his metoprolol twice. HR in ER in 70s range. Interval History:   8/4: BP is now on high after IVF and PRBC transfusion. Patient feel fine. Patient is on room air. Patient feel fine and he wants to go home. Review of Systems     GEN: No recent fever, chills or night sweats. Appetite is good. No weight loss.    HEENT: No changes in vision, sore throat, rhinorrhea   CVS: No cp, palpitations auscultation bilaterally except basal crackles and decreased BS and no respiratory distress on RA,  Heart: S1, S2. No tachycardia. Abdomen: soft, non-tender non-distended  Extremities: No leg edema, warm to touch   Skin: No concerning rashes noted  Psych: good eye contact, normal affect  Neuro: AAO x 3. Non focal grossly.    LUE: AVF, good bruit and thrill      Laboratory Data     Please see above     Diagnostic Studies   Pertinent images reviewed

## 2021-08-04 NOTE — PROGRESS NOTES
Round with ICU Team + cardiology/ will transfuse 1PC + plan for afternoon Nuc med stress test with dobutamine/ pt has sick wife at home who he is primary care and needs to be home as soon as possible/ Plan if test done this afternoon possible DC home with follow up  out pt care    1130 stress test cannot be done/ pt had 1 cup of coffee with breakfast per Nuc-med/ ICU + Cardiology made aware/ will cont with blood transfusion + low dose lopressor/ /EKG/ ambulate in hallway / report any abnormal s/s with hemodynamics/ see flow sheet/ notes/ orders    1545 pt  Transfusion completed/ ambulated in hallway x 2 laps in unit/ no issues/ Dr Tandy Mcardle at bedside / pending f/u H+H/ other labs added by nephrology/ called for phlebotomy draw/ danita notes/orders / flowsheet

## 2021-08-04 NOTE — CARE COORDINATION
assistance is needed, please contact unit  or ;  or  CANNOT provide pharmacy voucher for patients co-pays  5. Patients can then  the prescription on their way out of the hospital at discharge, or pharmacy can deliver to the bedside if staff is available. (payment due at time of pick-up or delivery - cash, check, or card accepted)     Able to afford home medications/ co-pay costs: Yes    ADLS  Support Systems:      PT AM-PAC:   /24  OT AM-PAC:   /24    [place]: apt  Steps: unsure    Plans to RETURN to current housing: Yes  Barriers to RETURNING to current housing: none noted      Dialysis  Active with HD/PD prior to admission: Yes  Nephrologist: [facility]:  [facility]   Address: [place]  Phone: [phone] PLAN:  Disposition: Home- No Services Needed    Transportation PLAN for discharge: family     Factors facilitating achievement of predicted outcomes: Family support, Cooperative and Pleasant    Barriers to discharge: PRBC, stress test    Additional Case Management Notes: NA    The Plan for Transition of Care is related to the following treatment goals of Hypotension [I95.9]    The Patient and/or patient representative Peng Sun and his family were provided with a choice of provider and agrees with the discharge plan Not Indicated    Freedom of choice list was provided with basic dialogue that supports the patient's individualized plan of care/goals and shares the quality data associated with the providers.  Not Indicated    Care Transition patient: No    Ana M Gonsalves, RN  [facility]  Case Management Department  Ph: [phone]   Fax: [phone]

## 2021-08-04 NOTE — PROGRESS NOTES
PharmD, [facility]  Wireless: [phone]  8/4/2021 9:44 AM    Addendum 8/4/2021 11:04 AM  - Vancomycin has been discontinued - Pharmacy will sign off vancomycin dosing  - If vancomycin is resumed, please re-consult Pharmacy    Please call with any questions.     Tristin Billingsley PharmD, [facility]  Wireless: [phone]  8/4/2021 11:04 AM

## 2021-08-04 NOTE — PROGRESS NOTES
Spoke with patient regarding recommendations from Cardiology and Nephrology that the patient remain in the hospital for further work-up and management. It was explained that leaving the hospital at this time would be against medical advice and may lead to worsening of his chronic conditions, acute decompensation, and potentially, death. Patient is alert and oriented and expressed understanding. He states that his family needs require him to leave the hospital at this time. He requests to leave AMA. Spoke with patient regarding the importance of outpatient follow-up with his PCP, Cardiology (Dr. Bridgette Rizo), and Nephrology (Dr. Mona Morgan). Patient stated he will follow-up outpatient as advised. All patient questions were answered. States he will sign AMA paperwork.

## 2021-08-04 NOTE — DISCHARGE SUMMARY
tablet, R-0Normal           Discharge Medication List as of 8/1/2021  4:26 PM        Discharge Medication List as of 8/1/2021  4:26 PM      CONTINUE these medications which have NOT CHANGED    Details   insulin glargine (LANTUS) 100 UNIT/ML injection vial Inject 15 Units into the skin nightlyHistorical Med      insulin lispro (HUMALOG) 100 UNIT/ML injection vial Inject into the skin 3 times daily (before meals) Sliding scaleHistorical Med           Discharge Medication List as of 8/1/2021  4:26 PM              Procedures: none    Assessment on Discharge: Stable, improved     Discharge ROS:  A complete review of systems was asked and negative except for none    Discharge Exam:  BP (!) 168/83   Pulse 94   Temp 99 °F (37.2 °C) (Oral)   Resp 18   Ht 5' 8\" (1.727 m)   Wt 158 lb 1.1 oz (71.7 kg)   SpO2 95%   BMI 24.03 kg/m²     Gen: NAD  HEENT: NC/AT, moist mucous membranes, no oropharyngeal erythema or exudate  Neck: supple, trachea midline, no anterior cervical or SC LAD  Heart:  Normal s1/s2, RRR, no murmurs, gallops, or rubs.  no leg edema  Lungs:  CTA bilaterally, no wheeze,no rales or rhonchi, no use of accessory muscles  Abd: bowel sounds present, soft, nontender, nondistended, no masses  Extrem:  No clubbing, cyanosis,  no edema  Skin: no lesion or masses  Psych:  A & O x3  Neuro: grossly intact, moves all four extremities    Pertinent Studies During Hospital Stay:  Radiology:  ECHO Complete 2D W Doppler W Color    Result Date: 8/3/2021  Transthoracic Echocardiography Report (TTE)  Demographics   Patient Name       Dianne Castaneda   Date of Study      08/03/2021         Gender              Male   Patient Number     [id number]         Date of Birth       1960   Visit Number       [id number]          Age                 63 year(s)   Accession Number   [id number]         Room Number         2TR   Corporate ID       [id number]           Sonographer         Latia Frey, T, [facility]   Ordering Physician Ady Martinez MD        Physician           Krystian Andre MD  Procedure Type of Study   TTE procedure:ECHOCARDIOGRAM COMPLETE 2D W DOPPLER W COLOR. Procedure Date Date: 08/03/2021 Start: 01:46 PM Study Location: [address] Echo Lab Technical Quality: Adequate visualization Additional Indications:hypotension and severe MR. Patient Status: Routine Height: 68 inches Weight: 164 pounds BSA: 1.88 m2 BMI: 24.94 kg/m2 BP: 76/52 mmHg  Conclusions   Summary  Left ventricular cavity size is normal. There is mild concentric left  ventricular hypertrophy. Overall left ventricular systolic function appears  normal with an ejection fraction of 55%. No regional wall motion  abnormalities are noted. Diastolic filling parameters suggest grade II  diastolic dysfunction. Mitral annular calcification is present. Mild to  moderate mitral regurgitation is present. The aortic valve leaflets are  slightly thickened /calcified. Mild aortic stenosis with a peak velocity of  2.8m/s and a mean pressure gradient of 17mmHg. Mild to moderate aortic  regurgitation. Mild tricuspid regurgitation. Estimated pulmonary artery  systolic pressure is at 33 mmHg assuming a right atrial pressure of 8 mmHg. The left atrium is severely dilated. There is a small circumferential  pericardial effusion noted mostly near right ventricle. Signature   ------------------------------------------------------------------  Electronically signed by Alberta Donaldson MD  (Interpreting physician) on 08/03/2021 at 02:53 PM  ------------------------------------------------------------------   Findings   Left Ventricle  Left ventricular cavity size is normal. There is mild concentric left  ventricular hypertrophy. Overall left ventricular systolic function appears  normal with an ejection fraction of 55%.  No regional wall motion abnormalities are noted. Diastolic filling parameters suggest grade II  diastolic dysfunction. Mitral Valve  The mitral valve leaflets appear normal in structure. Mitral annular  calcification is present. Mild to moderate mitral regurgitation is present. No evidence of mitral valve stenosis. Left Atrium  The left atrium is severely dilated. Aortic Valve  The aortic valve leaflets are slightly thickened /calcified. Mild aortic  stenosis with a peak velocity of 2.8m/s and a mean pressure gradient of  17mmHg. Mild to moderate aortic regurgitation. Aorta  The aortic root is normal in size. Right Ventricle  The right ventricle is normal in size and function. TAPSE measures 1.94 cm  and the RVS velocity measures 10.5cm/s. Tricuspid Valve  Tricuspid valve leaflets are structurally normal. Mild tricuspid  regurgitation. No evidence of tricuspid stenosis. Right Atrium  The right atrial size is normal.   Pulmonic Valve  The pulmonic valve is structurally normal. No evidence of pulmonic valve  regurgitation. No evidence of pulmonic valve stenosis. Pericardial Effusion  There is a small circumferential pericardial effusion noted mostly near  right ventricle. Pleural Effusion  No pleural effusion. Miscellaneous  IVC size is dilated (>2.1 cm) but collapses > 50% with respiration  consistent with elevated RA pressure (8 mmHg). Estimated pulmonary artery  systolic pressure is at 33 mmHg assuming a right atrial pressure of 8 mmHg.   M-Mode/2D Measurements (cm)   LV Diastolic Dimension: 5.2 cm  LV Systolic Dimension: 9.03 cm  LV Septum Diastolic: 9.32 cm  LV PW Diastolic: 6.94 cm        AO Root Dimension: 3.2 cm  RV Diastolic Dimension: 1.30 cm LA Dimension: 4.8 cm                                  LA Area: 27 cm2                                  LA volume/Index: 102 ml /54 ml/m2  Doppler Measurements   AV Peak Velocity: 280 cm/s     MV Peak E-Wave: 127 cm/s  AV Peak Gradient: 31.36 mmHg   MV Peak A-Wave: 85.7 cm/s AV Mean Gradient: 17 mmHg      MV E/A Ratio: 1.48  LVOT Peak Velocity: 110 cm/s   MV P1/2t: 69 msec   TR Velocity:253 cm/s  TR Gradient:25.6 mmHg  Estimated RAP:8 mmHg           MV Deceleration Time: 236 msec  Estimated RVSP: 33 mmHg        MV Area (PHT): 3.19 cm2  E' Septal Velocity: 5.56 cm/s  E' Lateral Velocity: 3.88 cm/s  E/E' ratio: 32.7  PV Peak Velocity: 89.2 cm/s  PV Peak Gradient: 3.18 mmHg   Aortic Valve   Peak Velocity: 280 cm/s   Mean Velocity: 198 cm/s  Peak Gradient: 31.36 mmHg Mean Gradient: 17 mmHg  AV VTI: 65.3 cm  AI P1/2t: 738 msec  Aorta   Aortic Root: 3.2 cm      CT HEAD WO CONTRAST    Result Date: 8/3/2021  EXAM: CT HEAD WO CONTRAST ON 8/3/2021 INDICATION: AMS COMPARISON: CT head 7/31/2021 TECHNICAL: Axial CT imaging obtained from vertex to skull base. Axial images and multiplanar reformatted images reviewed. Dose modulation, iterative reconstruction and/or weight based adjustments of the mA/kV were utilized to reduce radiation dose to as low as reasonably achievable IV Contrast: None. LIMITATIONS: None FINDINGS: : No additional abnormality INTRACRANIAL HEMORRHAGE: No intra or extra-axial hemorrhage is visualized. VENTRICLES: The ventricles are normal in size, shape and position. There is no midline shift or mass effect. BRAIN PARENCHYMA: There is mild to moderate atrophy. There is decreased attenuation the periventricular white matter There is no evidence of acute edema or infarct. . SKULL: No destructive osseous process or fracture. PARANASAL SINUSES AND MASTOIDS: The visualized paranasal sinuses and mastoid air cells are clear. ORBITS: Normal as visualized. EXTRACRANIAL SOFT TISSUES: Unremarkable. No acute intracranial pathology  Atrophy and ischemic leukoencephalopathy     CT HEAD WO CONTRAST    Result Date: 7/31/2021  Patient: Cazares Short  Time Out: 03:47 Exam(s): CT HEAD Without Contrast  EXAM:   CT Head Without Intravenous Contrast  CLINICAL HISTORY:   ams.   TECHNIQUE: dependent atelectasis and/or scarring. Nodules: Scattered calcified granulomas without suspicious noncalcified nodule. Pleura: No pleural effusions or significant pleural thickening. Heart and great vessels: Cardiomegaly. Small pericardial effusion. Coronary and aortic calcifications. Other mediastinal structures and lower neck: Normal. Adenopathy: None. Chest wall: No significant abnormality. Osseous structures: No significant abnormality. Upper abdomen: Peripheral wedgelike hypoattenuation in the spleen with capsular retraction. Mild thickening of the left adrenal gland. Mild patchy ground glass opacities with a slight central predominance. This is limited and direct comparison to prior radiograph but may be improved, potentially suggesting pulmonary edema. Atypical infection could have a similar appearance. Cardiomegaly with small pericardial effusion. Peripheral wedgelike hypoattenuation in this spleen with capsular retraction, nonspecific but suggestive of remote splenic infarct or laceration. INCIDENTAL FINDINGS:    XR CHEST PORTABLE    Result Date: 8/3/2021  EXAM: PORTABLE CXR SINGLE VIEW. INDICATION: AMS COMPARISON: Chest x-ray 7/30/2021. FINDINGS: Lines / tubes: None. Heart and Mediastinum: Normal in size. Lungs: Patchy bilateral consolidation in the mid and lower lung zones with slight increased interstitial markings stable. Pleural space: No pneumothorax. No effusion. Bones and Soft Tissues: No significant abnormality. Other: None. 1. Bilateral airspace disease stable. XR CHEST PORTABLE    Result Date: 7/30/2021  Portable chest HISTORY: Shortness of breath. FINDINGS: Focal opacities are present in the right middle lower lung zones. There may be focal airspace disease in the left midlung zone as well. Multifocal airspace opacities are noted. Recommend dedicated CT chest with intravenous contrast for further assessment.           Last Labs on Discharge:     Recent Results (from the past 24 hour(s))   TYPE AND SCREEN    Collection Time: 08/03/21  2:20 PM   Result Value Ref Range    ABO/Rh O POS     Antibody Screen NEG    PREPARE RBC (CROSSMATCH), 1 Units    Collection Time: 08/03/21  2:20 PM   Result Value Ref Range    Product Code Blood Bank [id number]     Description Blood Bank Red Blood Cells, Leuko-reduced     Unit Number [id number]     Dispense Status Blood Bank transfused    PREPARE RBC (CROSSMATCH), 1 Units    Collection Time: 08/03/21  2:20 PM   Result Value Ref Range    Product Code Blood Bank [id number]     Description Blood Bank Red Blood Cells, Leuko-reduced     Unit Number [id number]     Dispense Status Blood Bank transfused    EKG 12 Lead    Collection Time: 08/03/21  2:29 PM   Result Value Ref Range    Ventricular Rate 69 BPM    Atrial Rate 69 BPM    P-R Interval 162 ms    QRS Duration 96 ms    Q-T Interval 466 ms    QTc Calculation (Bazett) 499 ms    P Axis -15 degrees    R Axis 41 degrees    T Axis 69 degrees    Diagnosis       EKG performed in ER and to be interpreted by ER physician. Confirmed by MD, ER ([id number]),  Gay Mcleod ([phone]) on 8/3/2021 3:03:25 PM   Troponin    Collection Time: 08/03/21  3:38 PM   Result Value Ref Range    Troponin 0.17 (H) <0.01 ng/mL   Basic metabolic panel    Collection Time: 08/03/21  3:38 PM   Result Value Ref Range    Sodium 132 (L) 136 - 145 mmol/L    Potassium 4.8 3.5 - 5.1 mmol/L    Chloride 85 (L) 99 - 110 mmol/L    CO2 24 21 - 32 mmol/L    Anion Gap 23 (H) 3 - 16    Glucose 738 (HH) 70 - 99 mg/dL    BUN 83 (HH) 7 - 20 mg/dL    CREATININE 11.2 (HH) 0.9 - 1.3 mg/dL    GFR Non- 5 (A) >60    GFR  6 (A) >60    Calcium 6.0 (L) 8.3 - 10.6 mg/dL   TSH with Reflex    Collection Time: 08/03/21  3:38 PM   Result Value Ref Range    TSH 1.35 0.27 - 4.20 uIU/mL   POCT Glucose    Collection Time: 08/03/21  4:26 PM   Result Value Ref Range    POC Glucose 201 (H) 70 - 99 mg/dl    Performed on ACCU-CHEK    POCT Glucose Collection Time: 08/03/21  4:31 PM   Result Value Ref Range    Glucose 201 mg/dL   Troponin    Collection Time: 08/03/21  9:22 PM   Result Value Ref Range    Troponin 0.20 (H) <0.01 ng/mL   POCT Glucose    Collection Time: 08/03/21  9:30 PM   Result Value Ref Range    POC Glucose 170 (H) 70 - 99 mg/dl    Performed on ACCU-CHEK    Troponin    Collection Time: 08/04/21  4:14 AM   Result Value Ref Range    Troponin 0.32 (H) <0.01 ng/mL   Vancomycin, random    Collection Time: 08/04/21  4:14 AM   Result Value Ref Range    Vancomycin Rm 11.0 ug/mL   CBC auto differential    Collection Time: 08/04/21  4:14 AM   Result Value Ref Range    WBC 14.1 (H) 4.0 - 11.0 K/uL    RBC 2.51 (L) 4.20 - 5.90 M/uL    Hemoglobin 7.7 (L) 13.5 - 17.5 g/dL    Hematocrit 23.0 (L) 40.5 - 52.5 %    MCV 91.9 80.0 - 100.0 fL    MCH 30.7 26.0 - 34.0 pg    MCHC 33.4 31.0 - 36.0 g/dL    RDW 15.5 (H) 12.4 - 15.4 %    Platelets 968 441 - 810 K/uL    MPV 8.3 5.0 - 10.5 fL    Neutrophils % 80.9 %    Lymphocytes % 8.2 %    Monocytes % 8.0 %    Eosinophils % 2.7 %    Basophils % 0.2 %    Neutrophils Absolute 11.4 (H) 1.7 - 7.7 K/uL    Lymphocytes Absolute 1.2 1.0 - 5.1 K/uL    Monocytes Absolute 1.1 0.0 - 1.3 K/uL    Eosinophils Absolute 0.4 0.0 - 0.6 K/uL    Basophils Absolute 0.0 0.0 - 0.2 K/uL   Basic metabolic panel    Collection Time: 08/04/21  4:14 AM   Result Value Ref Range    Sodium 137 136 - 145 mmol/L    Potassium 4.5 3.5 - 5.1 mmol/L    Chloride 100 99 - 110 mmol/L    CO2 25 21 - 32 mmol/L    Anion Gap 12 3 - 16    Glucose 149 (H) 70 - 99 mg/dL    BUN 48 (H) 7 - 20 mg/dL    CREATININE 7.4 (HH) 0.9 - 1.3 mg/dL    GFR Non-African American 8 (A) >60    GFR  9 (A) >60    Calcium 7.2 (L) 8.3 - 10.6 mg/dL   Magnesium    Collection Time: 08/04/21  4:14 AM   Result Value Ref Range    Magnesium 1.90 1.80 - 2.40 mg/dL   EKG 12 Lead    Collection Time: 08/04/21  5:15 AM   Result Value Ref Range    Ventricular Rate 79 BPM    Atrial Rate 79

## 2021-08-04 NOTE — DISCHARGE INSTR - COC
Continuity of Care Form    Patient Name: Mary Chaidez   :  1960  MRN:  [id number]    Admit date:  8/3/2021  Discharge date: 2021    Code Status Order: Full Code   Advance Directives:     Admitting Physician:  Master Ramos MD  PCP: No primary care provider on file. Discharging Nurse: [facility] Unit/Room#: 2508/1366-62  Discharging Unit Phone Number: [phone]    Emergency Contact:   Extended Emergency Contact Information  Primary Emergency Contact: Estephania Velasquez  Home Phone: [phone]  Relation: Spouse  Preferred language: English   needed? No    Past Surgical History:  History reviewed. No pertinent surgical history. Immunization History: There is no immunization history on file for this patient.     Active Problems:  Patient Active Problem List   Diagnosis Code    Metabolic acidosis N03.9    Hypotension I95.9       Isolation/Infection:   Isolation          No Isolation        Patient Infection Status     None to display          Nurse Assessment:  Last Vital Signs: BP (!) 170/91   Pulse 89   Temp 98.2 °F (36.8 °C) (Oral)   Resp 22   Ht 5' 8\" (1.727 m)   Wt 178 lb (80.7 kg)   SpO2 98%   BMI 27.06 kg/m²     Last documented pain score (0-10 scale): Pain Level: 0  Last Weight:   Wt Readings from Last 1 Encounters:   21 178 lb (80.7 kg)     Mental Status:  oriented, alert, coherent, logical, thought processes intact and able to concentrate and follow conversation    IV Access:  - None    Nursing Mobility/ADLs:  Walking   Independent  Transfer  Independent  Bathing  Independent  Dressing  Independent  Toileting  Independent  Feeding  Independent  Med [address] [place]  Med Delivery   none    Wound Care Documentation and Therapy:        Elimination:  Continence:   · Bowel: No  · Bladder: No  Urinary Catheter: None   Colostomy/Ileostomy/Ileal Conduit: No       Date of Last BM: ***    Intake/Output Summary (Last 24 hours) at 2021 1614  Last data filed at 2021 1515  Gross per 24 hour   Intake 4770 ml   Output 1174 ml   Net 3596 ml     I/O last 3 completed shifts: In: 7627 [P.O.:720; I.V.:2100; Blood:500; IV Piggyback:450]  Out: 3951 [Urine:320]    Safety Concerns:     None    Impairments/Disabilities:      None    Nutrition Therapy:  Current Nutrition Therapy:   - Oral Diet:  General    Routes of Feeding: Oral  Liquids: Thin Liquids  Daily Fluid Restriction: yes - amount as prior to admission/ renal / HD pt  Last Modified Barium Swallow with Video (Video Swallowing Test): not done    Treatments at the Time of Hospital Discharge:   Respiratory Treatments: n/a  Oxygen Therapy:  is not on home oxygen therapy.   Ventilator:    - No ventilator support    Rehab Therapies: n/a  Weight Bearing Status/Restrictions: 50Jenny Aguilera CC Weight Bearin}  Other Medical Equipment (for information only, NOT a DME order):  n/a  Other Treatments: ***    Patient's personal belongings (please select all that are sent with patient):  Glasses, keys/ phone +     RN SIGNATURE:  {Esignature:[id number]}    CASE MANAGEMENT/SOCIAL WORK SECTION    Inpatient Status Date: ***    Readmission Risk Assessment Score:  Readmission Risk              Risk of Unplanned Readmission:  21           Discharging to Facility/ Agency   · Name:   · Address:  · Phone:  · Fax:    Dialysis Facility (if applicable)   · Name:  · Address:  · Dialysis Schedule:  · Phone:  · Fax:    / signature: {Esignature:[id number]}    PHYSICIAN SECTION    Prognosis: {Prognosis:0936468753}    Condition at Discharge: 50Jenny Aguilera Patient Condition:578210197}    Rehab Potential (if transferring to Rehab): {Prognosis:4272884654}    Recommended Labs or Other Treatments After Discharge: ***    Physician Certification: I certify the above information and transfer of Alvera Money  is necessary for the continuing treatment of the diagnosis listed and that he requires {Admit to Appropriate Level of Care:72100} for {GREATER/LESS:409599570} 30 days.      Update Admission H&P: {CHP DME Changes in XDEJC:154358643}    PHYSICIAN SIGNATURE:  {Esignature:[id number]}

## 2021-08-04 NOTE — FLOWSHEET NOTE
08/03/21 2100 08/04/21 0000   Treatment   Time On 2100  --    Time Off  --  0000   Treatment Goal 0  --    Observations & Evaluations   Level of Consciousness Responds to Voice (1) Responds to Voice (1)   Oriented X 3 3   Vital Signs   /62  --    Temp 98.2 °F (36.8 °C) 98.2 °F (36.8 °C)   Pulse 74  --    Resp 11  --    SpO2 95 %  --    Weight 178 lb 9.2 oz (81 kg) 178 lb 9.2 oz (81 kg)   Weight Method Bed scale Bed scale   Percent Weight Change 0 0   Pain Assessment   Pain Assessment 0-10 0-10   Pain Level 0 0   Access   Needle Size Used 15  --    Technical Checks   RO Machine Log Sheet Completed Yes  --    Machine Alarm Self Test Completed  --    Machine Autotest Completed  --    Air Foam Detector Tested  --    Extracorporeal Circuit Tested for Integrity Yes  --    Treatment Initiation   Dialyze Hours 3  --    Treatment  Initiation Universal Precautions maintained;Lines secured to patient; Connections secured;Prime given;Venous Parameters set; Arterial Parameters set; Air foam detector engaged; Hemosafe Device; Saline line double clamped; Hemo-Safe Applied;Dialyzer;F180  --    During Hemodialysis Assessment   Blood Flow Rate (ml/min) 250 ml/min  --    Ultrafiltration Rate (ml/hr) 300 ml/hr  --    Arterial Pressure (mmHg) -60 mmHg  --    Venous Pressure (mmHg) 110  --    TMP 50  --      --    Access Visible Yes  --    Dialysis Bath   K+ (Potassium) 2  --    Ca+ (Calcium) 2.5  --    Na+ (Sodium) 138  --    HCO3 (Bicarb) 35  --    Hemodialysis Fistula/Graft Arteriovenous fistula Left Forearm   No Placement Date or Time found. Pre-existing: Yes  Access Type: Arteriovenous fistula  Orientation: Left  Access Location: Forearm   Site Assessment Clean;Dry; Intact Clean;Dry; Intact   Thrill Present Present   Bruit Present Present   Access Interventions Aseptic Technique Aseptic Technique   Dressing Intervention Dressing reinforced  --    Dressing Status Clean;Dry; Intact Clean;Dry; Intact   Post-Hemodialysis Assessment   Post-Treatment Procedures  --  Blood returned; Access bleeding time > 10 minutes   Machine Disinfection Process  --  Acid/Vinegar Clean;Heat Disinfect; Exterior Machine Disinfection   Rinseback Volume (ml)  --  400 ml   Total Liters Processed (l/min)  --  42.1 l/min   Dialyzer Clearance  --  Lightly streaked   Duration of Treatment (minutes)  --  180 minutes   Hemodialysis Intake (ml)  --  800 ml   Hemodialysis Output (ml)  --  854 ml   NET Removed (ml)  --  54 ml   Tolerated Treatment  --  Good

## 2021-08-04 NOTE — PROGRESS NOTES
Patient admitted into [phone]. He is normotensive. Other Vital signs WNL. Preparing to receive dialysis.

## 2021-08-04 NOTE — DISCHARGE SUMMARY
INTERNAL MEDICINE DEPARTMENT AT [address] [place] [address]  DISCHARGE SUMMARY    Patient ID: Alessandra Dubois                                             Discharge Date: 8/4/2021   Patient's PCP: No primary care provider on file. Discharge Physician: Patient signed Jasbir Francis Date: 8/3/2021   Admitting Physician: Rebecca Paredes MD    PROBLEMS DURING HOSPITALIZATION:  Present on Admission:   Hypotension      DISCHARGE DIAGNOSES:  Syncope  HFpEF diastolic  Pneumonia  Hypertension  End-stage renal disease  Anemia of chronic disease      HPI:  72-year-old male with ESRD on HD T/TH/S, hypertension and DM who presented to the ED complaining of dizziness and syncope. Patient had a recent discharge from hospital 2 days ago. Was due for hemodialysis on the day of hospital visit. Patient was hypotensive in the ED with systolic BP of 52L to 76R. EKG done in the ED revealed ST elevations in leads II, III and aVF. Troponin was elevated at 0.37, BNP of more than 70,000. Hemoglobin was 7.2 and patient received 1 units of PRBC transfusion in the ED. potassium was elevated at 5.6 patient had a echo done which revealed small circumferential pericardial effusion, grade 2 diastolic dysfunction, mild to moderate mitral regurgitation and mild to moderate aortic regurgitation. He received 2 L of IV fluids and hypotension improved. Blood cultures were done and He was given a stat dose of vancomycin and Rocephin in the ED. Iansarath Quinn He was transferred to the ICU and monitored with serial troponins and serial EKGs. In ICU, patient received hemodialysis. Troponin decreased to 0.17 and then there was an increase today after to 0.32. Patient consistently denied any chest pain, shortness of breath, dyspnea abdominal pain, or any signs and symptoms of a MI.  IV antibiotics were discontinued and patient was started on cefdinir p.o.   Patient was started on Lopressor 12.5 mg twice daily and the first dose was given in the ICU. Patient was observed and patient ambulated throughout the ICU. There were no acute events after Lopressor given. Patient received another transfusion of 1 unit PRBC. Patient expressed that he wanted to leave AMA. Discussions regarding recommendations from cardiology and nephrology and tingling why patient should remain in the hospital for further work-up and management was done with the patient. It was also explained that leaving the hospital at this time would be [address] and may lead to worsening of his chronic condition, acute decompensation which would potentially lead to death. Patient voiced that he understood however he stated that his family needs required him to leave the hospital at this time. Discussion regarding the importance of outpatient follow-up with his PCP, Cardiology (Dr. Ang Huang), and Nephrology (Dr. Candace Arriaga) was done. Patient stated he will follow-up outpatient as advised. All patient questions were answered. State stated he would sign AMA paperwork.        Physical Exam:  BP (!) 168/86   Pulse 81   Temp 98.2 °F (36.8 °C) (Oral)   Resp (!) 6   Ht 5' 8\" (1.727 m)   Wt 178 lb (80.7 kg)   SpO2 97%   BMI 27.06 kg/m²       Consults: Nephrology, cardiology  Significant Diagnostic Studies: Echocardiogram, CT head, chest x-ray, EKG  Treatments: Hemodialysis  Disposition: Home  Discharged Condition: Stable  Follow Up: Primary Care Physician , nephrologist and cardiologist in 1 week    DISCHARGE MEDICATION:     Medication List        ASK your doctor about these medications      amLODIPine 5 MG tablet  Commonly known as: NORVASC  Take 1 tablet by mouth daily     azithromycin 500 MG tablet  Commonly known as: ZITHROMAX  Take 1 tablet by mouth daily for 3 days     cefdinir 300 MG capsule  Commonly known as: OMNICEF  Take 1 capsule by mouth 2 times daily for 5 days     insulin glargine 100 UNIT/ML injection vial  Commonly known as: LANTUS     insulin lispro 100 UNIT/ML injection vial  Commonly known as: HUMALOG     metoprolol succinate 25 MG extended release tablet  Commonly known as: TOPROL XL  Take 1 tablet by mouth daily     sevelamer 800 MG tablet  Commonly known as: RENVELA  Take 2 tablets by mouth 3 times daily (with meals)            Activity: Tolerated  Diet: Renal diet      Time Spent on discharge is more than 30 minutes    Signed:  Annia Harrison MD

## 2021-08-04 NOTE — PROGRESS NOTES
[facility]  Cardiology Inpatient Consult Service  Daily Progress Note        Admit Date:  8/3/2021    Referring Physician: Diana Valencia MD    Reason for Consultation/Chief Complaint: \"I passed out\"    Subjective: Interval history:  No acute events overnight. Patient is sitting on the chair comfortably. He denies shortness of breath, chest pain, dizziness, fever, chills. Patient expresses that his wife recently had a stroke and he is the primary care taker and  wants to go home as soon as possible. One unit of pRBC was given. Patient systolic blood pressure was in the 170s. We started the patient on Lopressor 12.5 twice daily. Medications:   epoetin natalie-epbx  10,000 Units Subcutaneous Once    [Held by provider] cefdinir  300 mg Oral 2 times per day    metoprolol tartrate  12.5 mg Oral BID    sodium chloride flush  5-40 mL Intravenous 2 times per day    insulin lispro  0-6 Units Subcutaneous TID WC    insulin lispro  0-3 Units Subcutaneous Nightly    heparin (porcine)  5,000 Units Subcutaneous 3 times per day       IV drips:   sodium chloride      sodium chloride      sodium chloride      dextrose         PRN:  sodium chloride, sodium chloride, sodium chloride flush, sodium chloride, ondansetron **OR** ondansetron, polyethylene glycol, acetaminophen **OR** acetaminophen, glucose, dextrose, glucagon (rDNA), dextrose      Objective:     Vitals:    08/04/21 1100 08/04/21 1115 08/04/21 1130 08/04/21 1139   BP: (!) 158/82   (!) 160/80   Pulse: 93 102 88 87   Resp: 17 23 17 18   Temp: 98.9 °F (37.2 °C)   98.9 °F (37.2 °C)   TempSrc: Oral   (P) Oral   SpO2: 94%  97% (P) 93%   Weight:       Height:           Intake/Output Summary (Last 24 hours) at 8/4/2021 1151  Last data filed at 8/4/2021 1010  Gross per 24 hour   Intake 4830 ml   Output 1174 ml   Net 3656 ml     I/O last 3 completed shifts: In: 2108 [P.O.:240;  I.V.:2100; Blood:500; IV Piggyback:700]  Out: 8582 [Urine:320]  Wt Readings from Last 3 Encounters:   08/04/21 178 lb (80.7 kg)   07/31/21 158 lb 1.1 oz (71.7 kg)       Admit Wt: Weight: 178 lb 9.2 oz (81 kg)   Todays Wt: Weight: 178 lb (80.7 kg)    TELEMETRY: Sinus     Physical Exam:     General:  Awake, alert, NAD  Skin:  Warm and dry  Neck:  No JVP  Chest: Respiration normal, Pericardial friction, Friction rub  Cardiovascular:  RRR S1S2  Abdomen:  Soft   Extremities: No edema      Objective    Labs:   Recent Labs     08/03/21  1143 08/03/21  1143 08/03/21  1538 08/03/21  1631 08/04/21  0414     --  132*  --  137   K 5.6*  --  4.8  --  4.5   BUN 96*  --  83*  --  48*   CREATININE 12.8*  --  11.2*  --  7.4*   CL 90*  --  85*  --  100   CO2 27  --  24  --  25   GLUCOSE 174*   < > 738* 201 149*   CALCIUM 7.2*  --  6.0*  --  7.2*   MG 2.20  --   --   --  1.90    < > = values in this interval not displayed. Recent Labs     08/03/21  1141 08/03/21  1143 08/03/21  1143 08/04/21  0414   WBC  --  13.7*  --  14.1*   HGB  --  7.0*  --  7.7*   HCT 20.8* 21.0*  --  23.0*   PLT  --  429  --  358   MCV  --  92.6   < > 91.9    < > = values in this interval not displayed. No results for input(s): CHOLTOT, TRIG, HDL in the last 72 hours. Invalid input(s): LIPIDCOMM, CHOLHDL, VLDCHOL, LDL  Recent Labs     08/03/21  1143   INR 1.31*     Recent Labs     08/03/21  1143 08/03/21  1538 08/03/21  2122 08/04/21  0414   TROPONINI 0.37* 0.17* 0.20* 0.32*     No results for input(s): BNP in the last 72 hours. No results for input(s): NTPROBNP in the last 72 hours. No results for input(s): TSH in the last 72 hours. Imaging:   I personally reviewed imaging studies including CXR, Stress test, TTE/LOUIE. Assessment & Plan:   Pericarditis  EKG shows an concave up ST elevation in the inferior leads without reciprocal depression. Given patients EKG findings, absence of  SOB, CP. Troponin levels.  And ESRD on dialysis, his condition is more likely to be related to aggressive dehydration.  - Troponin today:0.32  - Stop trending troponin   - S/p transfussion 1 pRBC   - Plan to Nuc med stress test with dobutamine this afternoon    Hypertension  -170s    - On metoprolol 25 mg & amlodipine 5 mg at home   - Start Lopressor 12.5 mg BID         This patient was staffed with the attending physician, Dr. Gail Palacios MD.    Harpal Steele you for allowing to us to participate in the care of Brianna Dominugez. Please call our service with questions. Chyna Hernandez, MS-4  Sirisha Frost MD  PGY-2        Staff Note      Patient seen and evaluated with the medical resident. I was physically present during the critical portions of the service when performed by the resident including the assessment and management of the patient. Pt seems to have pericarditis. Friction rub on exam.  ECG much improved today with improvement in BP. Check ESR. Troponin may be elevated in pericarditis and echo showed normal wall motion. Colchicine. Pt should be observed and is also very anemic. That alone may incite further syncopal spells. I agree with the findings and plans as described. Lacey Perez, [phone]

## 2021-08-05 LAB
ESTIMATED AVERAGE GLUCOSE: 125.5 MG/DL
HBA1C MFR BLD: 6 %

## 2021-08-07 LAB
BLOOD CULTURE, ROUTINE: NORMAL
CULTURE, BLOOD 2: NORMAL

## 2021-08-13 NOTE — PROGRESS NOTES
Physician Progress Note      Lonnie Jerez  [facility] #:                  [id number]  :                       1960  ADMIT DATE:       8/3/2021 11:21 AM  DISCH DATE:        2021 5:15 PM  RESPONDING  PROVIDER #:        Therese Estrada DO          QUERY TEXT:    Patient admitted 8/3-  with syncope. Noted ot have hypotension possibly due   to Topral XL (new medication), acute on chronic anemia, dehydration, ongoing   PNA (previously diagnosed) & pericarditis. If possible, please document in   discharge summary the suspected cause of syncope: The medical record reflects the following:  Risk Factors: New antihypertensives, dehydration, anemia, PNA, pericardititis  Clinical Indicators: Per H&P \"Syncope and collapse likely due to hypotension,   was recently started on metoprolol\",  Per ICU c/s  \"admits that he has not   been eating and drinking as much; ER was very conservative with fluid and on   further fluid resuscitation in ICU his blood pressure normalized; Chest x-ray   showed bilateral opacities improved on the right but a little bit worse on the   left compared to recent. He denies any fevers, cough, dyspnea, or sputum\". Per Cards  \"his condition is more likely to be related to aggressive   dehydration; Troponin may be elevated in pe  Treatment: 2500 cc IVF bolus, 2 units PRBC, Norvasc & Toprol XL discontinued &   Metoprolol Tartrate started at discharge  Options provided:  -- Syncope due to hypotension from Norvasc and Toprol XL  -- Syncope due to Pericarditis  -- Syncope due to other acute on chronic blood loss anemia  -- Syncope due to dehydration  -- Syncope due to ongoing pneumonia  -- Other - I will add my own diagnosis  -- Disagree - Not applicable / Not valid  -- Disagree - Clinically unable to determine / Unknown  -- Refer to Clinical Documentation Reviewer    PROVIDER RESPONSE TEXT:    Syncope due to hypotension from Norvasc and Toprol XL.     Query created by: Batsheva Amador on 8/9/2021 8:04 AM      Electronically signed by:  Fawad Rausch DO 8/13/2021 1:30 PM

## 2021-09-10 ENCOUNTER — FOLLOWUP TELEPHONE ENCOUNTER (OUTPATIENT)
Dept: ICU | Age: 61
End: 2021-09-10

## 2021-11-08 ENCOUNTER — APPOINTMENT (OUTPATIENT)
Dept: GENERAL RADIOLOGY | Age: 61
DRG: 189 | End: 2021-11-08
Payer: COMMERCIAL

## 2021-11-08 ENCOUNTER — HOSPITAL ENCOUNTER (INPATIENT)
Age: 61
LOS: 1 days | Discharge: HOME | DRG: 189 | End: 2021-11-09
Attending: STUDENT IN AN ORGANIZED HEALTH CARE EDUCATION/TRAINING PROGRAM | Admitting: INTERNAL MEDICINE
Payer: COMMERCIAL

## 2021-11-08 DIAGNOSIS — R09.02 HYPOXIA: Primary | ICD-10-CM

## 2021-11-08 PROBLEM — E87.5 HYPERKALEMIA: Status: ACTIVE | Noted: 2021-11-08

## 2021-11-08 LAB
A/G RATIO: 1.1 (ref 1.1–2.2)
ALBUMIN SERPL-MCNC: 4.2 G/DL (ref 3.4–5)
ALP BLD-CCNC: 87 U/L (ref 40–129)
ALT SERPL-CCNC: 12 U/L (ref 10–40)
ANION GAP SERPL CALCULATED.3IONS-SCNC: 21 MMOL/L (ref 3–16)
AST SERPL-CCNC: 19 U/L (ref 15–37)
BASE EXCESS VENOUS: -1.5 MMOL/L (ref -2–3)
BASE EXCESS VENOUS: 3.2 MMOL/L (ref -2–3)
BASOPHILS ABSOLUTE: 0.1 K/UL (ref 0–0.2)
BASOPHILS RELATIVE PERCENT: 1.2 %
BILIRUB SERPL-MCNC: 0.3 MG/DL (ref 0–1)
BUN BLDV-MCNC: 49 MG/DL (ref 7–20)
CALCIUM SERPL-MCNC: 8 MG/DL (ref 8.3–10.6)
CARBOXYHEMOGLOBIN: 3 % (ref 0–1.5)
CARBOXYHEMOGLOBIN: 3.2 % (ref 0–1.5)
CHLORIDE BLD-SCNC: 96 MMOL/L (ref 99–110)
CHP ED QC CHECK: YES
CO2: 25 MMOL/L (ref 21–32)
CREAT SERPL-MCNC: 7.8 MG/DL (ref 0.9–1.3)
EKG ATRIAL RATE: 101 BPM
EKG DIAGNOSIS: NORMAL
EKG P AXIS: -14 DEGREES
EKG P-R INTERVAL: 140 MS
EKG Q-T INTERVAL: 378 MS
EKG QRS DURATION: 94 MS
EKG QTC CALCULATION (BAZETT): 490 MS
EKG R AXIS: 23 DEGREES
EKG T AXIS: -36 DEGREES
EKG VENTRICULAR RATE: 101 BPM
EOSINOPHILS ABSOLUTE: 0.4 K/UL (ref 0–0.6)
EOSINOPHILS RELATIVE PERCENT: 4.1 %
GFR AFRICAN AMERICAN: 9
GFR NON-AFRICAN AMERICAN: 7
GLUCOSE BLD-MCNC: 154 MG/DL (ref 70–99)
GLUCOSE BLD-MCNC: 163 MG/DL (ref 70–99)
GLUCOSE BLD-MCNC: 210 MG/DL
GLUCOSE BLD-MCNC: 210 MG/DL (ref 70–99)
GLUCOSE BLD-MCNC: 276 MG/DL (ref 70–99)
HCO3 VENOUS: 29.4 MMOL/L (ref 24–28)
HCO3 VENOUS: 30.5 MMOL/L (ref 24–28)
HCT VFR BLD CALC: 38.9 % (ref 40.5–52.5)
HEMOGLOBIN, VEN, REDUCED: 18.5 %
HEMOGLOBIN, VEN, REDUCED: 19.3 %
HEMOGLOBIN: 12.3 G/DL (ref 13.5–17.5)
INR BLD: 1.21 (ref 0.88–1.12)
LACTIC ACID: 4.5 MMOL/L (ref 0.4–2)
LIPASE: 65 U/L (ref 13–60)
LYMPHOCYTES ABSOLUTE: 2.8 K/UL (ref 1–5.1)
LYMPHOCYTES RELATIVE PERCENT: 27.2 %
MCH RBC QN AUTO: 28.9 PG (ref 26–34)
MCHC RBC AUTO-ENTMCNC: 31.5 G/DL (ref 31–36)
MCV RBC AUTO: 91.8 FL (ref 80–100)
METHEMOGLOBIN VENOUS: 0.3 % (ref 0–1.5)
METHEMOGLOBIN VENOUS: 0.6 % (ref 0–1.5)
MONOCYTES ABSOLUTE: 1.1 K/UL (ref 0–1.3)
MONOCYTES RELATIVE PERCENT: 10.7 %
NEUTROPHILS ABSOLUTE: 5.9 K/UL (ref 1.7–7.7)
NEUTROPHILS RELATIVE PERCENT: 56.8 %
O2 SAT, VEN: 80 %
O2 SAT, VEN: 81 %
PCO2, VEN: 59.3 MMHG (ref 41–51)
PCO2, VEN: 85.6 MMHG (ref 41–51)
PDW BLD-RTO: 18.8 % (ref 12.4–15.4)
PERFORMED ON: ABNORMAL
PH VENOUS: 7.14 (ref 7.35–7.45)
PH VENOUS: 7.32 (ref 7.35–7.45)
PLATELET # BLD: 482 K/UL (ref 135–450)
PMV BLD AUTO: 7.6 FL (ref 5–10.5)
PO2, VEN: 50.9 MMHG (ref 25–40)
PO2, VEN: 63 MMHG (ref 25–40)
POTASSIUM REFLEX MAGNESIUM: 5.9 MMOL/L (ref 3.5–5.1)
POTASSIUM SERPL-SCNC: 6.9 MMOL/L (ref 3.5–5.1)
PRO-BNP: ABNORMAL PG/ML (ref 0–124)
PROTHROMBIN TIME: 13.8 SEC (ref 9.9–12.7)
RBC # BLD: 4.24 M/UL (ref 4.2–5.9)
SODIUM BLD-SCNC: 142 MMOL/L (ref 136–145)
TCO2 CALC VENOUS: 32 MMOL/L
TCO2 CALC VENOUS: 32 MMOL/L
TOTAL PROTEIN: 8.1 G/DL (ref 6.4–8.2)
TROPONIN: 0.2 NG/ML
TROPONIN: 0.21 NG/ML
WBC # BLD: 10.4 K/UL (ref 4–11)

## 2021-11-08 PROCEDURE — 82803 BLOOD GASES ANY COMBINATION: CPT

## 2021-11-08 PROCEDURE — 71045 X-RAY EXAM CHEST 1 VIEW: CPT

## 2021-11-08 PROCEDURE — 94761 N-INVAS EAR/PLS OXIMETRY MLT: CPT

## 2021-11-08 PROCEDURE — 90935 HEMODIALYSIS ONE EVALUATION: CPT

## 2021-11-08 PROCEDURE — 83690 ASSAY OF LIPASE: CPT

## 2021-11-08 PROCEDURE — 6360000002 HC RX W HCPCS: Performed by: STUDENT IN AN ORGANIZED HEALTH CARE EDUCATION/TRAINING PROGRAM

## 2021-11-08 PROCEDURE — 6370000000 HC RX 637 (ALT 250 FOR IP): Performed by: STUDENT IN AN ORGANIZED HEALTH CARE EDUCATION/TRAINING PROGRAM

## 2021-11-08 PROCEDURE — 36415 COLL VENOUS BLD VENIPUNCTURE: CPT

## 2021-11-08 PROCEDURE — 93005 ELECTROCARDIOGRAM TRACING: CPT | Performed by: STUDENT IN AN ORGANIZED HEALTH CARE EDUCATION/TRAINING PROGRAM

## 2021-11-08 PROCEDURE — 84132 ASSAY OF SERUM POTASSIUM: CPT

## 2021-11-08 PROCEDURE — 5A1D70Z PERFORMANCE OF URINARY FILTRATION, INTERMITTENT, LESS THAN 6 HOURS PER DAY: ICD-10-PCS | Performed by: INTERNAL MEDICINE

## 2021-11-08 PROCEDURE — 83605 ASSAY OF LACTIC ACID: CPT

## 2021-11-08 PROCEDURE — 96376 TX/PRO/DX INJ SAME DRUG ADON: CPT

## 2021-11-08 PROCEDURE — 1200000000 HC SEMI PRIVATE

## 2021-11-08 PROCEDURE — 2500000003 HC RX 250 WO HCPCS: Performed by: STUDENT IN AN ORGANIZED HEALTH CARE EDUCATION/TRAINING PROGRAM

## 2021-11-08 PROCEDURE — 84484 ASSAY OF TROPONIN QUANT: CPT

## 2021-11-08 PROCEDURE — 85025 COMPLETE CBC W/AUTO DIFF WBC: CPT

## 2021-11-08 PROCEDURE — 83880 ASSAY OF NATRIURETIC PEPTIDE: CPT

## 2021-11-08 PROCEDURE — 94660 CPAP INITIATION&MGMT: CPT

## 2021-11-08 PROCEDURE — 96375 TX/PRO/DX INJ NEW DRUG ADDON: CPT

## 2021-11-08 PROCEDURE — 80053 COMPREHEN METABOLIC PANEL: CPT

## 2021-11-08 PROCEDURE — 85610 PROTHROMBIN TIME: CPT

## 2021-11-08 PROCEDURE — 99284 EMERGENCY DEPT VISIT MOD MDM: CPT

## 2021-11-08 PROCEDURE — 2700000000 HC OXYGEN THERAPY PER DAY

## 2021-11-08 PROCEDURE — 96374 THER/PROPH/DIAG INJ IV PUSH: CPT

## 2021-11-08 RX ORDER — DEXTROSE MONOHYDRATE 50 MG/ML
100 INJECTION, SOLUTION INTRAVENOUS PRN
Status: DISCONTINUED | OUTPATIENT
Start: 2021-11-08 | End: 2021-11-09 | Stop reason: HOSPADM

## 2021-11-08 RX ORDER — KETAMINE HYDROCHLORIDE 50 MG/ML
INJECTION, SOLUTION, CONCENTRATE INTRAMUSCULAR; INTRAVENOUS
Status: DISPENSED
Start: 2021-11-08 | End: 2021-11-09

## 2021-11-08 RX ORDER — INSULIN LISPRO 100 [IU]/ML
0-12 INJECTION, SOLUTION INTRAVENOUS; SUBCUTANEOUS
Status: DISCONTINUED | OUTPATIENT
Start: 2021-11-09 | End: 2021-11-09 | Stop reason: HOSPADM

## 2021-11-08 RX ORDER — NICOTINE POLACRILEX 4 MG
15 LOZENGE BUCCAL PRN
Status: DISCONTINUED | OUTPATIENT
Start: 2021-11-08 | End: 2021-11-09 | Stop reason: HOSPADM

## 2021-11-08 RX ORDER — ROCURONIUM BROMIDE 10 MG/ML
INJECTION, SOLUTION INTRAVENOUS
Status: DISPENSED
Start: 2021-11-08 | End: 2021-11-09

## 2021-11-08 RX ORDER — POTASSIUM CHLORIDE 7.45 MG/ML
10 INJECTION INTRAVENOUS PRN
Status: DISCONTINUED | OUTPATIENT
Start: 2021-11-08 | End: 2021-11-09 | Stop reason: HOSPADM

## 2021-11-08 RX ORDER — FUROSEMIDE 10 MG/ML
80 INJECTION INTRAMUSCULAR; INTRAVENOUS ONCE
Status: COMPLETED | OUTPATIENT
Start: 2021-11-08 | End: 2021-11-08

## 2021-11-08 RX ORDER — DEXTROSE MONOHYDRATE 25 G/50ML
25 INJECTION, SOLUTION INTRAVENOUS ONCE
Status: COMPLETED | OUTPATIENT
Start: 2021-11-08 | End: 2021-11-08

## 2021-11-08 RX ORDER — CALCIUM GLUCONATE 94 MG/ML
1000 INJECTION, SOLUTION INTRAVENOUS ONCE
Status: COMPLETED | OUTPATIENT
Start: 2021-11-08 | End: 2021-11-08

## 2021-11-08 RX ORDER — ACETAMINOPHEN 325 MG/1
650 TABLET ORAL EVERY 6 HOURS PRN
Status: DISCONTINUED | OUTPATIENT
Start: 2021-11-08 | End: 2021-11-09 | Stop reason: HOSPADM

## 2021-11-08 RX ORDER — DEXTROSE MONOHYDRATE 25 G/50ML
12.5 INJECTION, SOLUTION INTRAVENOUS PRN
Status: DISCONTINUED | OUTPATIENT
Start: 2021-11-08 | End: 2021-11-09 | Stop reason: HOSPADM

## 2021-11-08 RX ORDER — HEPARIN SODIUM 5000 [USP'U]/ML
5000 INJECTION, SOLUTION INTRAVENOUS; SUBCUTANEOUS EVERY 8 HOURS SCHEDULED
Status: DISCONTINUED | OUTPATIENT
Start: 2021-11-08 | End: 2021-11-09 | Stop reason: HOSPADM

## 2021-11-08 RX ORDER — MAGNESIUM SULFATE IN WATER 40 MG/ML
2000 INJECTION, SOLUTION INTRAVENOUS PRN
Status: DISCONTINUED | OUTPATIENT
Start: 2021-11-08 | End: 2021-11-09 | Stop reason: HOSPADM

## 2021-11-08 RX ORDER — ONDANSETRON 2 MG/ML
4 INJECTION INTRAMUSCULAR; INTRAVENOUS EVERY 6 HOURS PRN
Status: DISCONTINUED | OUTPATIENT
Start: 2021-11-08 | End: 2021-11-09 | Stop reason: HOSPADM

## 2021-11-08 RX ORDER — INSULIN LISPRO 100 [IU]/ML
0-6 INJECTION, SOLUTION INTRAVENOUS; SUBCUTANEOUS NIGHTLY
Status: DISCONTINUED | OUTPATIENT
Start: 2021-11-08 | End: 2021-11-09 | Stop reason: HOSPADM

## 2021-11-08 RX ORDER — PROMETHAZINE HYDROCHLORIDE 25 MG/1
12.5 TABLET ORAL EVERY 6 HOURS PRN
Status: DISCONTINUED | OUTPATIENT
Start: 2021-11-08 | End: 2021-11-09 | Stop reason: HOSPADM

## 2021-11-08 RX ORDER — SODIUM CHLORIDE 0.9 % (FLUSH) 0.9 %
10 SYRINGE (ML) INJECTION EVERY 12 HOURS SCHEDULED
Status: DISCONTINUED | OUTPATIENT
Start: 2021-11-08 | End: 2021-11-09 | Stop reason: HOSPADM

## 2021-11-08 RX ORDER — SODIUM CHLORIDE 0.9 % (FLUSH) 0.9 %
10 SYRINGE (ML) INJECTION PRN
Status: DISCONTINUED | OUTPATIENT
Start: 2021-11-08 | End: 2021-11-09 | Stop reason: HOSPADM

## 2021-11-08 RX ORDER — ACETAMINOPHEN 650 MG/1
650 SUPPOSITORY RECTAL EVERY 6 HOURS PRN
Status: DISCONTINUED | OUTPATIENT
Start: 2021-11-08 | End: 2021-11-09 | Stop reason: HOSPADM

## 2021-11-08 RX ORDER — SODIUM CHLORIDE 9 MG/ML
25 INJECTION, SOLUTION INTRAVENOUS PRN
Status: DISCONTINUED | OUTPATIENT
Start: 2021-11-08 | End: 2021-11-09 | Stop reason: HOSPADM

## 2021-11-08 RX ADMIN — FUROSEMIDE 80 MG: 10 INJECTION INTRAMUSCULAR; INTRAVENOUS at 17:38

## 2021-11-08 RX ADMIN — INSULIN HUMAN 10 UNITS: 100 INJECTION, SOLUTION PARENTERAL at 20:37

## 2021-11-08 RX ADMIN — CALCIUM GLUCONATE 1000 MG: 98 INJECTION, SOLUTION INTRAVENOUS at 17:36

## 2021-11-08 RX ADMIN — DEXTROSE MONOHYDRATE 25 G: 25 INJECTION, SOLUTION INTRAVENOUS at 20:37

## 2021-11-08 RX ADMIN — CALCIUM GLUCONATE 1000 MG: 98 INJECTION, SOLUTION INTRAVENOUS at 20:37

## 2021-11-08 ASSESSMENT — PAIN SCALES - GENERAL: PAINLEVEL_OUTOF10: 0

## 2021-11-08 NOTE — ED PROVIDER NOTES
meals) Sliding scale    METOPROLOL TARTRATE (LOPRESSOR) 25 MG TABLET    Take 0.5 tablets by mouth 2 times daily    SEVELAMER (RENVELA) 800 MG TABLET    Take 2 tablets by mouth 3 times daily (with meals)       Allergies     He has No Known Allergies. Physical Exam     INITIAL VITALS: BP: 125/82,  , Pulse: 97, Resp: 30, SpO2: (!) 50 %     General: Unresponsive  Skin: warm, dry and pink without noted rashes or lesions  Head: normocephalic atraumatic  Eyes: Pupils equal, extra ocular movements grossly intact  Mouth / Pharynx: moist mucus membranes without erythema or lesions noted  Neck: full range of motion without meningismus  Chest: Significant rales throughout all lung fields  Heart: regular normal S1 and S2 without murmur  noted  Abdomen: negative external findings, + bowel sounds, soft and non tender; no rebound; non distended  Extremities: well perfused with palpable distal pulses; full range of motion grossly intact, no edema    DiagnosticResults     EKG   Sinus tachycardia rate of 101 without ectopy. Normal axis and intervals. There is ST depressions throughout the anterior lateral leads which may reflect the patient's profound hypoxia at presentation. .  There is no evidence of STEMI. RADIOLOGY:  XR CHEST PORTABLE   Final Result      Diffuse bilateral infiltrates with worsening from prior exam. Appearance suggests pulmonary edema versus pneumonia.              LABS:   Results for orders placed or performed during the hospital encounter of 11/08/21   CBC Auto Differential   Result Value Ref Range    WBC 10.4 4.0 - 11.0 K/uL    RBC 4.24 4.20 - 5.90 M/uL    Hemoglobin 12.3 (L) 13.5 - 17.5 g/dL    Hematocrit 38.9 (L) 40.5 - 52.5 %    MCV 91.8 80.0 - 100.0 fL    MCH 28.9 26.0 - 34.0 pg    MCHC 31.5 31.0 - 36.0 g/dL    RDW 18.8 (H) 12.4 - 15.4 %    Platelets 803 (H) 060 - 450 K/uL    MPV 7.6 5.0 - 10.5 fL    Neutrophils % 56.8 %    Lymphocytes % 27.2 %    Monocytes % 10.7 %    Eosinophils % 4.1 %    Basophils % 1.2 %    Neutrophils Absolute 5.9 1.7 - 7.7 K/uL    Lymphocytes Absolute 2.8 1.0 - 5.1 K/uL    Monocytes Absolute 1.1 0.0 - 1.3 K/uL    Eosinophils Absolute 0.4 0.0 - 0.6 K/uL    Basophils Absolute 0.1 0.0 - 0.2 K/uL   Comprehensive Metabolic Panel w/ Reflex to MG   Result Value Ref Range    Sodium 142 136 - 145 mmol/L    Potassium reflex Magnesium 5.9 (H) 3.5 - 5.1 mmol/L    Chloride 96 (L) 99 - 110 mmol/L    CO2 25 21 - 32 mmol/L    Anion Gap 21 (H) 3 - 16    Glucose 276 (H) 70 - 99 mg/dL    BUN 49 (H) 7 - 20 mg/dL    CREATININE 7.8 (HH) 0.9 - 1.3 mg/dL    GFR Non-African American 7 (A) >60    GFR  9 (A) >60    Calcium 8.0 (L) 8.3 - 10.6 mg/dL    Total Protein 8.1 6.4 - 8.2 g/dL    Albumin 4.2 3.4 - 5.0 g/dL    Albumin/Globulin Ratio 1.1 1.1 - 2.2    Total Bilirubin 0.3 0.0 - 1.0 mg/dL    Alkaline Phosphatase 87 40 - 129 U/L    ALT 12 10 - 40 U/L    AST 19 15 - 37 U/L   Lipase   Result Value Ref Range    Lipase 65.0 (H) 13.0 - 60.0 U/L   Blood Gas, Venous   Result Value Ref Range    pH, Brandon 7.144 (LL) 7.350 - 7.450    pCO2, Brandon 85.6 (H) 41.0 - 51.0 mmHg    pO2, Brandon 63.0 (H) 25.0 - 40.0 mmHg    HCO3, Venous 29.4 (H) 24.0 - 28.0 mmol/L    Base Excess, Brandon -1.5 -2.0 - 3.0 mmol/L    O2 Sat, Brandon 81 Not established %    Carboxyhemoglobin 3.2 (H) 0.0 - 1.5 %    MetHgb, Brnadon 0.6 0.0 - 1.5 %    TC02 (Calc), Brandon 32 mmol/L    Hemoglobin, Brandon, Reduced 18.50 %   Lactic Acid, Plasma   Result Value Ref Range    Lactic Acid 4.5 (HH) 0.4 - 2.0 mmol/L   PT - INR   Result Value Ref Range    Protime 13.8 (H) 9.9 - 12.7 sec    INR 1.21 (H) 0.88 - 1.12   POCT Glucose   Result Value Ref Range    Glucose 210 mg/dL    QC OK?  yes    POCT Glucose   Result Value Ref Range    POC Glucose 210 (H) 70 - 99 mg/dl    Performed on ACCU-CHEK        ED BEDSIDE ULTRASOUND:  na    RECENT VITALS:  BP: (!) 138/107,  , Pulse: 88,Resp: 22, SpO2: 97 %     Procedures     na    ED Course     Nursing Notes, Past Medical Hx, Past Surgical Hx, Social Hx, Allergies, and Family Hx were reviewed. The patient was given the followingmedications:  Orders Placed This Encounter   Medications    ketamine (KETALAR) 50 MG/ML injection     Autumn Marlon: cabinet override    rocuronium (ZEMURON) 50 MG/5ML injection     Autumn Marlon: cabinet override    DISCONTD: calcium gluconate 1,000 mg in dextrose 5 % 100 mL IVPB    furosemide (LASIX) injection 80 mg    calcium gluconate 10 % injection 1,000 mg       CONSULTS:  IP CONSULT TO CRITICAL CARE  IP CONSULT TO HOSPITALIST  IP CONSULT TO [address] [place] [address] / ASSESSMENT / Alex Leland is a 63 y.o. male noted in extremis with profound hypoxia with presumed high-level pulmonary edema/flash pulmonary edema. He was unresponsive. He was started on positive pressure ventilation with bag valve mask in an effort to preoxygenate him with a plan to intubate him with ketamine and rocuronium for RSI. As medications were being prepared IV access obtained and the patient preoxygenated his oxygenation improved and with that his mental status. He became alert conversant following commands. He remained tachypneic. He was repositioned and transition to BiPAP and maintain his mental status and oxygenation. His chest x-ray supports significant pulmonary edema. He remains hemodynamically stable. Given that he responded to diuresis on his last admission he is started on IV dose of 80 mg Lasix. His work-up is reviewed and he is noted with an elevated lactate 4.5.. He is noted with a hypercarbic respiratory acidosis likely has a combined hypoxic hypercarbic respiratory failure. He will require admission and his case is discussed with the medical ICU who suggested given his progress he would benefit from admission to stepdown. His case is discussed with the admitting team as well as renal of concern for emergent dialysis which will be arranged.   He is excepted for admission and this

## 2021-11-08 NOTE — ED TRIAGE NOTES
EMS reports pt states it started 1/2 hr prior to their arrival. Original pulse ox 60's and then pt became unresponsive.

## 2021-11-08 NOTE — ED PROVIDER NOTES
ED Attending Attestation Note     Date of evaluation: 11/8/2021    This patient was seen by the resident. I have seen and examined the patient, agree with the workup, evaluation, management and diagnosis. The care plan has been discussed. I have reviewed the ECG and concur with the resident's interpretation. My assessment reveals a gentleman initially unresponsive but not bradypneic or tachypneic who called EMS for SOB. On my initial exam, the patient is profoundly hypoxic but does have a pulse and has had a pulse throughout his entire EMS and emergency department stay. The patient had been initiated on CPAP by EMS which was initially continued here, but we transition to and assisted bag-valve-mask ventilation due to the patient's lack of tachypnea and concern for insufficient volume. With the intervention of this assisted respiration, the patient rapidly improved in both his pulse oximetry as well as in his mental status. He is now awake alert and conversant. He indicates that he has been compliant with his dialysis. Review of the chart shows that the patient does have a history of a similar presentation a week ago which improved with BiPAP. The patient has able to participate in the exam and therefore I do feel is appropriate for trial of BiPAP. His initial laboratory studies are consistent with volume overload as well as acute hypercarbic and hypoxic respiratory failure. The chart also shows that he benefited from a dose of Lasix at his prior presentation so we will repeat that here. On reassessment, the patient continues to be awake alert conversant appears to be tolerating BiPAP well. Critical Care:  Due to the immediate potential for life-threatening deterioration due to hypoxia, I spent 32 minutes providing critical care.   This time excludes time spent performing procedures but includes time spent on direct patient care, history retrieval, review of the chart, and discussions with patient,

## 2021-11-08 NOTE — ED NOTES
Critical lab [facility] 7.144.  Labs were given  to [address] [place] [address]  11/08/21 8027

## 2021-11-09 VITALS
WEIGHT: 153.88 LBS | SYSTOLIC BLOOD PRESSURE: 182 MMHG | DIASTOLIC BLOOD PRESSURE: 78 MMHG | BODY MASS INDEX: 23.32 KG/M2 | TEMPERATURE: 98 F | RESPIRATION RATE: 18 BRPM | HEIGHT: 68 IN | HEART RATE: 86 BPM | OXYGEN SATURATION: 97 %

## 2021-11-09 LAB
A/G RATIO: 1.1 (ref 1.1–2.2)
ALBUMIN SERPL-MCNC: 3.5 G/DL (ref 3.4–5)
ALP BLD-CCNC: 67 U/L (ref 40–129)
ALT SERPL-CCNC: 9 U/L (ref 10–40)
ANION GAP SERPL CALCULATED.3IONS-SCNC: 16 MMOL/L (ref 3–16)
AST SERPL-CCNC: 15 U/L (ref 15–37)
BASOPHILS ABSOLUTE: 0.1 K/UL (ref 0–0.2)
BASOPHILS RELATIVE PERCENT: 1.1 %
BILIRUB SERPL-MCNC: 0.3 MG/DL (ref 0–1)
BUN BLDV-MCNC: 29 MG/DL (ref 7–20)
CALCIUM SERPL-MCNC: 8.3 MG/DL (ref 8.3–10.6)
CHLORIDE BLD-SCNC: 97 MMOL/L (ref 99–110)
CO2: 24 MMOL/L (ref 21–32)
CREAT SERPL-MCNC: 5.9 MG/DL (ref 0.9–1.3)
EOSINOPHILS ABSOLUTE: 0.4 K/UL (ref 0–0.6)
EOSINOPHILS RELATIVE PERCENT: 4.3 %
ESTIMATED AVERAGE GLUCOSE: 134.1 MG/DL
FERRITIN: 834.8 NG/ML (ref 30–400)
GFR AFRICAN AMERICAN: 12
GFR NON-AFRICAN AMERICAN: 10
GLUCOSE BLD-MCNC: 103 MG/DL (ref 70–99)
GLUCOSE BLD-MCNC: 128 MG/DL (ref 70–99)
GLUCOSE BLD-MCNC: 172 MG/DL (ref 70–99)
GLUCOSE BLD-MCNC: 58 MG/DL (ref 70–99)
GLUCOSE BLD-MCNC: 65 MG/DL (ref 70–99)
GLUCOSE BLD-MCNC: 94 MG/DL (ref 70–99)
HBA1C MFR BLD: 6.3 %
HCT VFR BLD CALC: 31.6 % (ref 40.5–52.5)
HEMOGLOBIN: 10.4 G/DL (ref 13.5–17.5)
IRON SATURATION: 28 % (ref 20–50)
IRON: 47 UG/DL (ref 59–158)
LYMPHOCYTES ABSOLUTE: 1.5 K/UL (ref 1–5.1)
LYMPHOCYTES RELATIVE PERCENT: 16.9 %
MCH RBC QN AUTO: 28.9 PG (ref 26–34)
MCHC RBC AUTO-ENTMCNC: 32.9 G/DL (ref 31–36)
MCV RBC AUTO: 87.8 FL (ref 80–100)
MONOCYTES ABSOLUTE: 0.7 K/UL (ref 0–1.3)
MONOCYTES RELATIVE PERCENT: 7.9 %
NEUTROPHILS ABSOLUTE: 6.2 K/UL (ref 1.7–7.7)
NEUTROPHILS RELATIVE PERCENT: 69.8 %
PDW BLD-RTO: 18.3 % (ref 12.4–15.4)
PERFORMED ON: ABNORMAL
PERFORMED ON: NORMAL
PLATELET # BLD: 329 K/UL (ref 135–450)
PMV BLD AUTO: 7.5 FL (ref 5–10.5)
POTASSIUM REFLEX MAGNESIUM: 4.4 MMOL/L (ref 3.5–5.1)
RBC # BLD: 3.6 M/UL (ref 4.2–5.9)
SODIUM BLD-SCNC: 137 MMOL/L (ref 136–145)
TOTAL IRON BINDING CAPACITY: 167 UG/DL (ref 260–445)
TOTAL PROTEIN: 6.7 G/DL (ref 6.4–8.2)
WBC # BLD: 8.9 K/UL (ref 4–11)

## 2021-11-09 PROCEDURE — 83036 HEMOGLOBIN GLYCOSYLATED A1C: CPT

## 2021-11-09 PROCEDURE — 83550 IRON BINDING TEST: CPT

## 2021-11-09 PROCEDURE — 90935 HEMODIALYSIS ONE EVALUATION: CPT | Performed by: INTERNAL MEDICINE

## 2021-11-09 PROCEDURE — 87086 URINE CULTURE/COLONY COUNT: CPT

## 2021-11-09 PROCEDURE — G0378 HOSPITAL OBSERVATION PER HR: HCPCS

## 2021-11-09 PROCEDURE — 83540 ASSAY OF IRON: CPT

## 2021-11-09 PROCEDURE — 80053 COMPREHEN METABOLIC PANEL: CPT

## 2021-11-09 PROCEDURE — 6370000000 HC RX 637 (ALT 250 FOR IP): Performed by: STUDENT IN AN ORGANIZED HEALTH CARE EDUCATION/TRAINING PROGRAM

## 2021-11-09 PROCEDURE — 6370000000 HC RX 637 (ALT 250 FOR IP): Performed by: INTERNAL MEDICINE

## 2021-11-09 PROCEDURE — 6360000002 HC RX W HCPCS: Performed by: INTERNAL MEDICINE

## 2021-11-09 PROCEDURE — 96376 TX/PRO/DX INJ SAME DRUG ADON: CPT

## 2021-11-09 PROCEDURE — 99223 1ST HOSP IP/OBS HIGH 75: CPT | Performed by: INTERNAL MEDICINE

## 2021-11-09 PROCEDURE — 85025 COMPLETE CBC W/AUTO DIFF WBC: CPT

## 2021-11-09 PROCEDURE — 94761 N-INVAS EAR/PLS OXIMETRY MLT: CPT

## 2021-11-09 PROCEDURE — 82728 ASSAY OF FERRITIN: CPT

## 2021-11-09 PROCEDURE — 2700000000 HC OXYGEN THERAPY PER DAY

## 2021-11-09 PROCEDURE — 36415 COLL VENOUS BLD VENIPUNCTURE: CPT

## 2021-11-09 PROCEDURE — 94660 CPAP INITIATION&MGMT: CPT

## 2021-11-09 PROCEDURE — 90935 HEMODIALYSIS ONE EVALUATION: CPT

## 2021-11-09 RX ORDER — DULOXETIN HYDROCHLORIDE 60 MG/1
60 CAPSULE, DELAYED RELEASE ORAL DAILY
COMMUNITY

## 2021-11-09 RX ORDER — SEVELAMER CARBONATE 800 MG/1
1600 TABLET, FILM COATED ORAL
Status: DISCONTINUED | OUTPATIENT
Start: 2021-11-09 | End: 2021-11-09 | Stop reason: HOSPADM

## 2021-11-09 RX ORDER — WARFARIN SODIUM 5 MG/1
2.5-5 TABLET ORAL DAILY
Status: ON HOLD | COMMUNITY
End: 2022-05-29

## 2021-11-09 RX ORDER — NIFEDIPINE 10 MG/1
20 CAPSULE ORAL ONCE
Status: COMPLETED | OUTPATIENT
Start: 2021-11-09 | End: 2021-11-09

## 2021-11-09 RX ORDER — ASPIRIN 81 MG/1
81 TABLET ORAL DAILY
COMMUNITY

## 2021-11-09 RX ORDER — CARVEDILOL 25 MG/1
12.5 TABLET ORAL 2 TIMES DAILY WITH MEALS
COMMUNITY

## 2021-11-09 RX ORDER — GABAPENTIN 100 MG/1
100 CAPSULE ORAL NIGHTLY
Status: ON HOLD | COMMUNITY
End: 2022-10-07 | Stop reason: HOSPADM

## 2021-11-09 RX ORDER — HYDRALAZINE HYDROCHLORIDE 20 MG/ML
10 INJECTION INTRAMUSCULAR; INTRAVENOUS EVERY 4 HOURS PRN
Status: DISCONTINUED | OUTPATIENT
Start: 2021-11-09 | End: 2021-11-09 | Stop reason: HOSPADM

## 2021-11-09 RX ORDER — ATORVASTATIN CALCIUM 40 MG/1
40 TABLET, FILM COATED ORAL DAILY
COMMUNITY

## 2021-11-09 RX ORDER — NIFEDIPINE 60 MG/1
60 TABLET, FILM COATED, EXTENDED RELEASE ORAL 2 TIMES DAILY
Status: ON HOLD | COMMUNITY
End: 2021-11-09

## 2021-11-09 RX ORDER — TRAZODONE HYDROCHLORIDE 50 MG/1
50 TABLET ORAL NIGHTLY
COMMUNITY

## 2021-11-09 RX ORDER — NIFEDIPINE 60 MG/1
60 TABLET, EXTENDED RELEASE ORAL 2 TIMES DAILY
COMMUNITY

## 2021-11-09 RX ADMIN — HYDRALAZINE HYDROCHLORIDE 10 MG: 20 INJECTION INTRAMUSCULAR; INTRAVENOUS at 03:25

## 2021-11-09 RX ADMIN — DEXTROSE 15 G: 15 GEL ORAL at 01:59

## 2021-11-09 RX ADMIN — METOPROLOL TARTRATE 12.5 MG: 25 TABLET, FILM COATED ORAL at 12:47

## 2021-11-09 RX ADMIN — INSULIN LISPRO 1 UNITS: 100 INJECTION, SOLUTION INTRAVENOUS; SUBCUTANEOUS at 12:56

## 2021-11-09 RX ADMIN — SEVELAMER CARBONATE 1600 MG: 800 TABLET, FILM COATED ORAL at 12:47

## 2021-11-09 RX ADMIN — NIFEDIPINE 20 MG: 10 CAPSULE ORAL at 05:32

## 2021-11-09 ASSESSMENT — ENCOUNTER SYMPTOMS
SHORTNESS OF BREATH: 1
VOMITING: 0
NAUSEA: 0
CHEST TIGHTNESS: 0
DIARRHEA: 0
COUGH: 0
APNEA: 0
SORE THROAT: 0
CONSTIPATION: 0
ABDOMINAL PAIN: 0

## 2021-11-09 ASSESSMENT — PAIN SCALES - GENERAL
PAINLEVEL_OUTOF10: 0

## 2021-11-09 NOTE — ED NOTES
[facility] RN called and are ready for Trauma 1. Patient will be transported to Dialysis on ED stretcher with tele.       Jordan Chavez RN  11/08/21 5252

## 2021-11-09 NOTE — CARE COORDINATION
Case Management Assessment            Discharge Note                    Date / Time of Note: 11/9/2021 1:39 PM                  Discharge Note Completed by: ANTON Thornton, PHUONGW    Patient Name: Patrice Taylor   YOB: 1960  Diagnosis: Hyperkalemia [E87.5]  Hypoxia [R09.02]   Date / Time: 11/8/2021  5:04 PM    Current PCP: No primary care provider on file. Clinic patient: No    Hospitalization in the last 30 days: No    Advance Directives:  Code Status: Full Code  [place] DNR form completed and on chart: No    Financial:  Payor: Rossy Rao / Plan: Sourav Crowe / Product Type: *No Product type* /      Pharmacy:    [address] [place] [address] [phone]  [address] [place] [address]  Phone: [phone] Fax: [phone]      Assistance purchasing medications?:    Assistance provided by Case Management: None at this time    Does patient want to participate in local refill/ meds to beds program?:      Meds To Beds General Rules:  1. Can ONLY be done Monday- Friday between 8:30am-5pm  2. Prescription(s) must be in pharmacy by 3pm to be filled same day  3. Copy of patient's insurance/ prescription drug card and patient face sheet must be sent along with the prescription(s)  4. Cost of Rx cannot be added to hospital bill. If financial assistance is needed, please contact unit  or ;  or  CANNOT provide pharmacy voucher for patients co-pays  5.  Patients can then  the prescription on their way out of the hospital at discharge, or pharmacy can deliver to the bedside if staff is available. (payment due at time of pick-up or delivery - cash, check, or card accepted)     Able to afford home medications/ co-pay costs: Yes    ADLS:  Current PT AM-PAC Score:   /24  Current OT AM-PAC Score:   /24      DISCHARGE Disposition: Home- No Services Needed    LOC at discharge: Not Applicable  RAVINDER Completed: Yes    Notification completed in HENS/PAS?:  Not Applicable    IMM Completed:   Not Indicated    Transportation:  Transportation PLAN for discharge: family   Mode of Transport: Private Car    Dialysis:  Dialysis patient: Yes    Dialysis Center:  [facility]   Address: [address]  Phone: [phone]   Lanette BellamyNick lema      Referrals made at [facility] for outpatient continued care:  Not Applicable    Additional CM Notes: CM met with pt at bedside to discuss DC needs as pt has DC order. Pt reported plan to return home, no current HHC or DME needs. Pt confirmed dialysis center and schedule, and that his sister will be able to transport him home. CM spoke with Odalis Boateng at [facility] to make aware of pt's DC. No DC needs at this time. The Plan for Transition of Care is related to the following treatment goals of Hyperkalemia [E87.5]  Hypoxia [R09.02]    The Patient and/or patient representative Paulo Kaye and his family were provided with a choice of provider and agrees with the discharge plan Yes    Freedom of choice list was provided with basic dialogue that supports the patient's individualized plan of care/goals and shares the quality data associated with the providers.  Yes    Care Transitions patient: No    ANTON Horvath, [facility] [employer]  Case Management Department  Ph: [phone]

## 2021-11-09 NOTE — PROGRESS NOTES
Nephrology Consult Note    Chief Complaint: Shortness of breath  Consulted For: HD  Consulted By; Dr. Khari Velázquez    History Obtained From:  patient    HISTORY OF PRESENT ILLNESS:   Mr. Arun Mccarty is a 33-year-old man with a past medical history of ESRD [on HD], hypertension, type 2 diabetes who presented to the hospital via EMS for shortness of breath. Patient has had call paramedics was found in the field to be hypoxic with a saturation into the 60s. Patient states that the series events that prompted his admission started the day prior to presentation. He states that his best friend unfortunately passed away and his memory a group of them had a few drinks to celebrate his life. States that this is the first time he had alcohol in a number of years and likely overdid it. He had 6 or 7 beers and a few shots. States that after the party was over he tried to take the bus home, but it was running. States that he was on the call for a number of hours. The next day patient states that this is when shortness of breath started to come on. He did not delay and called EMS. He was found to be hypoxic and brought emergently to [facility]. he was initiated on CPAP by EMS and received BiPAP when patient arrived in the ED. Patient was admitted for flash pulmonary edema. Patient states that he has been compliant and has not missed dialysis. Patient was admitted and received emergent dialysis overnight. Patient's blood pressure also elevated with systolics to the 490M to 247Y. Presenting labs are pertinent for potassium 5.9, proBNP greater than 70,000, troponin 0 0.21, creatinine 7.8. This morning the patient's blood pressure is 128/66. He is saturating well on 2 L nasal cannula. Already had hemodialysis well and had 3.1 L removed. PAST HISTORY:     Past Medical History:        Diagnosis Date    Chronic kidney disease     Hypertension    ·     Past Surgical History:    · History reviewed.  No pertinent surgical history. Medications Priorto Admission:    · Medications Prior to Admission: ATORVASTATIN CALCIUM PO, Take by mouth  · WARFARIN SODIUM PO, Take by mouth  · CARVEDILOL PO, Take by mouth  · Empagliflozin (JARDIANCE PO), Take by mouth  · NIFEDIPINE PO, Take by mouth  · insulin glargine (LANTUS) 100 UNIT/ML injection vial, Inject 15 Units into the skin nightly  · insulin lispro (HUMALOG) 100 UNIT/ML injection vial, Inject into the skin 3 times daily (before meals) Sliding scale  · sevelamer (RENVELA) 800 MG tablet, Take 2 tablets by mouth 3 times daily (with meals)  · [DISCONTINUED] NIFEdipine (ADALAT CC) 60 MG extended release tablet, Take 60 mg by mouth 2 times daily  · metoprolol tartrate (LOPRESSOR) 25 MG tablet, Take 0.5 tablets by mouth 2 times daily    Allergies:  Naproxen    Social History:   · TOBACCO:   reports that he has quit smoking. He has never used smokeless tobacco.  · ETOH:   reports previous alcohol use. · DRUGS : none  · Patient currently lives with family at home    Family History:   · History reviewed. No pertinent family history. Review of Systems   Constitutional: Negative for activity change, appetite change, chills, diaphoresis, fever and unexpected weight change. HENT: Negative for congestion and sore throat. Eyes: Negative for visual disturbance. Respiratory: Positive for shortness of breath (now resolved). Negative for apnea, cough and chest tightness. Cardiovascular: Negative for chest pain, palpitations and leg swelling. Gastrointestinal: Negative for abdominal pain, constipation, diarrhea, nausea and vomiting. Endocrine: Negative for cold intolerance, heat intolerance, polydipsia, polyphagia and polyuria. Genitourinary: Negative for difficulty urinating. Musculoskeletal: Negative for arthralgias and myalgias. Neurological: Negative for weakness and headaches. Psychiatric/Behavioral: Negative for confusion and sleep disturbance.    All other systems reviewed and are negative. ROS: A 10 point review of systems was conducted, significant findings as noted in HPI. Physical Exam:     Vitals:    11/09/21 0911   BP:    Pulse:    Resp:    Temp:    SpO2: 93%     Physical Exam  Vitals reviewed. Constitutional:       General: He is not in acute distress. Appearance: He is well-developed and well-groomed. HENT:      Head: Normocephalic and atraumatic. Mouth/Throat:      Mouth: Mucous membranes are moist.      Pharynx: Oropharynx is clear. Eyes:      General: No scleral icterus. Extraocular Movements: Extraocular movements intact. Conjunctiva/sclera: Conjunctivae normal.      Pupils: Pupils are equal, round, and reactive to light. Cardiovascular:      Rate and Rhythm: Normal rate and regular rhythm. Pulses: Normal pulses. Heart sounds: Normal heart sounds, S1 normal and S2 normal. No murmur heard. Pulmonary:      Effort: Pulmonary effort is normal. No respiratory distress. Breath sounds: Normal breath sounds and air entry. Abdominal:      General: Abdomen is flat. Bowel sounds are normal.      Palpations: Abdomen is soft. Tenderness: There is no abdominal tenderness. Musculoskeletal:         General: Normal range of motion. Cervical back: Full passive range of motion without pain, normal range of motion and neck supple. Skin:     General: Skin is warm and dry. Neurological:      General: No focal deficit present. Mental Status: He is alert and oriented to person, place, and time. Cranial Nerves: Cranial nerves are intact. Sensory: Sensation is intact. Motor: Motor function is intact. Coordination: Coordination is intact. Gait: Gait is intact. Deep Tendon Reflexes: Reflexes are normal and symmetric.    Psychiatric:         Attention and Perception: Attention and perception normal.         Mood and Affect: Mood normal.         Speech: Speech normal.         Behavior: Behavior normal. Behavior is cooperative. Thought Content: Thought content normal.         Cognition and Memory: Cognition and memory normal.         Judgment: Judgment normal.         Data:   Labs:  CBC:   Recent Labs     11/08/21 1720 11/09/21  0508   WBC 10.4 8.9   HGB 12.3* 10.4*   HCT 38.9* 31.6*   * 329       BMP:   Recent Labs     11/08/21  1709 11/08/21  1720 11/08/21 1914 11/09/21  0508   NA  --  142  --  137   K  --  5.9* 6.9* 4.4   CL  --  96*  --  97*   CO2  --  25  --  24   BUN  --  49*  --  29*   CREATININE  --  7.8*  --  5.9*   GLUCOSE 210 276*  --  65*     LFT's:   Recent Labs     11/08/21 1720 11/09/21  0508   AST 19 15   ALT 12 9*   BILITOT 0.3 0.3   ALKPHOS 87 67     Troponin:   Recent Labs     11/08/21 1720 11/08/21 1914   TROPONINI 0.21* 0.20*     BNP:No results for input(s): BNP in the last 72 hours. ABGs: No results for input(s): PHART, EVF9KEH, PO2ART in the last 72 hours. INR:   Recent Labs     11/08/21 1720   INR 1.21*       U/A:No results for input(s): NITRITE, COLORU, PHUR, LABCAST, WBCUA, RBCUA, MUCUS, TRICHOMONAS, YEAST, BACTERIA, CLARITYU, SPECGRAV, LEUKOCYTESUR, UROBILINOGEN, BILIRUBINUR, BLOODU, GLUCOSEU, AMORPHOUS in the last 72 hours. Invalid input(s): KETONESU    XR CHEST PORTABLE   Final Result      Diffuse bilateral infiltrates with worsening from prior exam. Appearance suggests pulmonary edema versus pneumonia. ASSESSMENT AND PLAN:     Shortness of breath  Patient presented what appears to be flash pulmonary edema. Patient received emergent hemodialysis on presentation with resolution of her is shortness of breath. Was originally on BiPAP this resolved after dialysis. Likely secondary to large fluid intake and alcohol consumption. · Supplemental oxygen as needed  · Now resolved    ESRD on HD  Typically get dialysis Thursday Saturday. Received hemodialysis emergently on presentation. · Dry weight to be adjusted.   · Continue home dialysis schedule while patient admitted  · Cant be discharged home after HD today    Hypertensive urgency  Patient received emergent dialysis and had 3 L removed. Help with resolution of the patient's elevated BP.   · Continue home blood pressure medications    Code Status:Full  DISPO: General Medical Floor    This patient has been staffed and discussed with Dr. Kishan Medrano  -----------------------------  Christie Handley MD, PGY-3  11/9/2021  9:44 AM

## 2021-11-09 NOTE — DISCHARGE SUMMARY
setting of ESRD  #Hypertension  #Diabetes mellitus type 2  #PAD on Coumadin follow-up with vascular surgery. Physical Exam Performed:     BP (!) 149/71   Pulse 94   Temp 99 °F (37.2 °C)   Resp 18   Ht 5' 8\" (1.727 m)   Wt 161 lb 2.5 oz (73.1 kg)   SpO2 93%   BMI 24.50 kg/m²       General appearance:  No apparent distress, appears stated age and cooperative. HEENT:  Normal cephalic, atraumatic without obvious deformity. Pupils equal, round, and reactive to light. Extra ocular muscles intact. Conjunctivae/corneas clear. Neck: Supple, with full range of motion. No jugular venous distention. Trachea midline. Respiratory:  Normal respiratory effort. Clear to auscultation, bibasilar crackles  Cardiovascular:  Regular rate and rhythm with normal S1/S2 without murmurs, rubs or gallops. Abdomen: Soft, non-tender, non-distended with normal bowel sounds. Musculoskeletal:  No clubbing, cyanosis or edema bilaterally. Neurologic:  Neurovascularly intact without any focal sensory/motor deficits. Cranial nerves: II-XII intact, grossly non-focal.  Psychiatric:  Alert and oriented x3, thought content appropriate, normal insight  Capillary Refill: Brisk,< 3 seconds   Peripheral Pulses: +2 palpable, equal bilaterally       Labs: For convenience and continuity at follow-up the following most recent labs are provided:      CBC:    Lab Results   Component Value Date    WBC 8.9 11/09/2021    HGB 10.4 11/09/2021    HCT 31.6 11/09/2021     11/09/2021       Renal:    Lab Results   Component Value Date     11/09/2021    K 4.4 11/09/2021    CL 97 11/09/2021    CO2 24 11/09/2021    BUN 29 11/09/2021    CREATININE 5.9 11/09/2021    CALCIUM 8.3 11/09/2021    PHOS 4.6 07/31/2021         Significant Diagnostic Studies    Radiology:   XR CHEST PORTABLE   Final Result      Diffuse bilateral infiltrates with worsening from prior exam. Appearance suggests pulmonary edema versus pneumonia.                 Consults: IP CONSULT TO CRITICAL CARE  IP CONSULT TO HOSPITALIST  IP CONSULT TO HOSPITALIST  IP CONSULT TO PHARMACY    Disposition:  Home    Condition at Discharge: Stable    Discharge Instructions/Follow-up:  PCP    Code Status:  Full Code     Activity: activity as tolerated    Diet: diabetic diet      Discharge Medications:     Current Discharge Medication List           Details   atorvastatin (LIPITOR) 40 MG tablet Take 40 mg by mouth daily       warfarin (COUMADIN) 5 MG tablet Take 2.5-5 mg by mouth daily 2.5mg on Mondays, 5mg all other days      carvedilol (COREG) 25 MG tablet Take 25 mg by mouth 2 times daily (with meals)       NIFEdipine (PROCARDIA XL) 60 MG extended release tablet Take 60 mg by mouth 2 times daily      SITagliptin (JANUVIA) 25 MG tablet Take 25 mg by mouth daily      aspirin 81 MG EC tablet Take 81 mg by mouth daily      DULoxetine (CYMBALTA) 60 MG extended release capsule Take 60 mg by mouth daily      gabapentin (NEURONTIN) 100 MG capsule Take 100 mg by mouth nightly. traZODone (DESYREL) 50 MG tablet Take 50 mg by mouth nightly      vitamin D (CHOLECALCIFEROL) 25 MCG (1000 UT) TABS tablet Take 1,000 Units by mouth daily      insulin glargine (LANTUS) 100 UNIT/ML injection vial Inject 15 Units into the skin nightly      insulin lispro (HUMALOG) 100 UNIT/ML injection vial Inject into the skin 3 times daily (before meals) Sliding scale      sevelamer (RENVELA) 800 MG tablet Take 2 tablets by mouth 3 times daily (with meals)  Qty: 90 tablet, Refills: 3             Time Spent on discharge is more than 30 minutes in the examination, evaluation, counseling and review of medications and discharge plan. This chart was likely completed using voice recognition technology and may contain unintended grammatical , phraseology,and/or punctuation errors      Signed:    Zohreh Hay MD   11/9/2021      Thank you No primary care provider on file. for the opportunity to be involved in this patient's care. If you have any questions or concerns please feel free to contact me at [phone].

## 2021-11-09 NOTE — FLOWSHEET NOTE
11/09/21 0918 11/09/21 1216   Vital Signs   BP (!) 149/71 (!) 162/81   Temp 99 °F (37.2 °C) 98.8 °F (37.1 °C)   Pulse 94 86   Resp 18 18   Weight 161 lb 2.5 oz (73.1 kg) 153 lb 14.1 oz (69.8 kg)   Weight Method Standing scale Standing scale   Post-Hemodialysis Assessment   NET Removed (ml)  --  3000 ml   Tolerated Treatment  --  Good   Treatment time: 3 hours  Net UF: 3000 ml    Pre weight: 73.1 kg   Post weight: 69.8 kg  EDW: 75 kg    Access used: LLA AVF    Access function: good with  ml/min    Medications or blood products given: none    Regular outpatient schedule: [facility]    Summary of response to treatment: good- flow sheet with new post wt faxed to out pt clinic    Copy of dialysis treatment record placed in chart, to be scanned into EMR.

## 2021-11-09 NOTE — ED NOTES
Sherry Reed blood tube sent to lab for repeat K  VBG sent to lab.       Jarrell Montano RN  11/08/21 4970

## 2021-11-09 NOTE — H&P
Hospital Medicine History & Physical      PCP: No primary care provider on file. Date of Admission: 11/8/2021    Date of Service: Pt seen/examined on 11/8/2021  Pt seen/examined face to face on and admitted as inpatient with expected LOS greater than two midnights due to medical therapy    Chief Complaint:    Chief Complaint   Patient presents with    Shortness of Breath     EMS reports pt states it started 1/2 hr prior to their arrival. Original pulse ox 60's and then pt became unresponsive. History Of Present Illness:      63 y.o. 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 Patient came to the ED for worsening shortness of breath. Patient was noted to be hypoxic satting in the 60% and was placed on CPAP. On patient's arrival to the ED patient was unresponsive hypoxic, after oxygen supplementation patient mentation improved reported that he has not missed any dialysis reports continues to take oral intake. Shortness of breath exacerbated with exertion better with rest.  No association with fever, chills, nausea, vomiting, chest pain, shortness of breath and abdominal pain. In the ED labs was obtained patient showed hyperkalemia was also in acute respiratory failure requiring BiPAP therapy. Patient was admitted for dialysis. Past Medical History:          Diagnosis Date    Chronic kidney disease     Hypertension        Past Surgical History:      History reviewed. No pertinent surgical history. Medications Prior to Admission:      Prior to Admission medications    Medication Sig Start Date End Date Taking?  Authorizing Provider   metoprolol tartrate (LOPRESSOR) 25 MG tablet Take 0.5 tablets by mouth 2 times daily 8/4/21   Elysia Rosales DO   insulin glargine (LANTUS) 100 UNIT/ML injection vial Inject 15 Units into the skin nightly    Historical Provider, MD   insulin lispro (HUMALOG) 100 UNIT/ML injection vial Inject into the skin 3 times daily (before meals) Sliding scale    Historical Provider, MD   sevelamer (RENVELA) 800 MG tablet Take 2 tablets by mouth 3 times daily (with meals) 8/1/21   Blanquita Mcgrath MD       Allergies:  Patient has no known allergies. Social History:          TOBACCO:   reports that he has quit smoking. He has never used smokeless tobacco.  ETOH:   reports previous alcohol use. E-Cigarettes/Vaping Use     Questions Responses    E-Cigarette/Vaping Use     Start Date     Passive Exposure     Quit Date     Counseling Given     Comments             Family History:      Reviewed in detail, and noncontributory    REVIEW OF SYSTEMS:     Constitutional:  No Fever, No Chills, No Night Sweats  ENT/Mouth:  No Nasal Congestion,  No Hoarseness, No new mouth lesion  Eyes:  No Eye Pain, No Redness, No Discharge  Cardiovascular:  No Chest Pain, No Orthopnea, No Palpitations  Respiratory:  No Cough, No Sputum,+ Dyspnea  Gastrointestinal: No Vomiting, No Diarrhea, No abdominal pain  Genitourinary: No Urinary Frequency, No Hematuria, No Urinary pain  Musculoskeletal:  No worsening Arthralgias, No worsening Myalgias  Skin:  No new Skin Lesions, No new skin rash  Neuro:  No new weakness, No new numbness. Psych:  No suicial ideation, No Violence ideation    PHYSICAL EXAM PERFORMED:    BP (!) 166/105   Pulse 78   Temp 97.6 °F (36.4 °C)   Resp 14   Ht 5' 8\" (1.727 m)   Wt 173 lb 15.1 oz (78.9 kg)   SpO2 100%   BMI 26.45 kg/m²     General appearance:  mild acute distress, appears older than stated age  HEENT:   atraumatic, sclera anicteric, Conjunctivae clear. Neck: Supple,Trachea midline, no goiter  Respiratory:minimal accessory muscle usage, Normal respiratory effort. crackles bilaterally  Cardiovascular:  Regular rate and rhythm, capillary refill 2 seconds  Abdomen: Soft, non-tender, non-distended with normal bowel sounds. Musculoskeletal:  No clubbing, cyanosis. 3+ edema LE bilaterally. Skin: turgor normal.  No new rashes or lesions. Neurologic: Alert and oriented x3, no new focal sensory/motor deficits. Labs:     Recent Labs     11/08/21  1720   WBC 10.4   HGB 12.3*   HCT 38.9*   *     Recent Labs     11/08/21  1720 11/08/21 1914     --    K 5.9* 6.9*   CL 96*  --    CO2 25  --    BUN 49*  --    CREATININE 7.8*  --    CALCIUM 8.0*  --      Recent Labs     11/08/21 1720   AST 19   ALT 12   BILITOT 0.3   ALKPHOS 87     Recent Labs     11/08/21 1720   INR 1.21*     Recent Labs     11/08/21 1720 11/08/21 1914   TROPONINI 0.21* 0.20*       Urinalysis:    No results found for: Tiffany Merl, BACTERIA, RBCUA, BLOODU, [address], GLUCOSEU    Radiology:     CXR: I have reviewed the CXR with the following interpretation:   Pulmonary edema  EKG:  I have reviewed the EKG with the following interpretation:   Sinus tachycardia    XR CHEST PORTABLE   Final Result      Diffuse bilateral infiltrates with worsening from prior exam. Appearance suggests pulmonary edema versus pneumonia. ASSESSMENT AND PLAN:    Active Hospital Problems    Diagnosis Date Noted    Hyperkalemia [E87.5] 11/08/2021     acute hypoxic respiratory failure,  secondary to pulmonary edema  responding well with BiPAP therapy  after dialysis reassessed    End-stage renal disease:  Nephrology consulted, much appreciated    Hyperkalemia:   In the setting of end-stage renal disease  patient received dialysis  Recheck in the am    NSTEMI: Type II in the setting of fluid overload end-stage renal disease  continue to trend    Normocytic anemia  iron panel, Hemoccult pending    HTN  Urgency:  meds ordered    Type 2 Diabetes: Insulin sliding scale    Diet: renal diet    DVT Prophylaxis: heparin    Dispo:   Expected LOS greater than two DO Aisha

## 2021-11-09 NOTE — PROGRESS NOTES
4 Eyes Admission Assessment     I agree as the admission nurse that 2 RN's have performed a thorough Head to Toe Skin Assessment on the patient. ALL assessment sites listed below have been assessed on admission. Areas assessed by both nurses: ***  [x]   Head, Face, and Ears   [x]   Shoulders, Back, and Chest  [x]   Arms, Elbows, and Hands   [x]   Coccyx, Sacrum, and Ischium  [x]   Legs, Feet, and Heels        Does the Patient have Skin Breakdown?   No         Cezar Prevention initiated:  No   Wound Care Orders initiated:  No      [facility] nurse consulted for Pressure Injury (Stage 3,4, Unstageable, DTI, NWPT, and Complex wounds) or Cezar score 18 or lower:  No      Nurse 1 eSignature: Electronically signed by Van Lal RN on 11/9/21 at 8:57 AM EST    **SHARE this note so that the co-signing nurse is able to place an eSignature**    Nurse 2 eSignature: {Esignature:[id number]}  ]

## 2021-11-09 NOTE — PROGRESS NOTES
Discharge order received. Patient informed of discharge order. Discharge instructions reviewed with patient. Copy of discharge instructions given to patient. Patient verbalized understanding, denies needs or questions at this time. IV and telemetry removed. All patient belongings packed and sent with patient upon discharge. Pt's sister in law picked him up. Pt aware of the need to follow up with PCP in one week and resume outpt hd schedule  Pt returned to [id number] due to his sister not being able to get him from hospital at this time.  Pt's discharged at [phone]

## 2021-11-09 NOTE — PROGRESS NOTES
2147: Pt transported to dialysis while on BIPAP 12/6/30% per physician order; pt tolerated well. 0110:  Pt states his breathing is much better after dialysis; Pt transported back to the ED on 3L O2 via nasal cannula. Pt tolerates being off BIPAP well and denies any dyspnea. SpO2 100% on 3L; O2 decreased to 2L. Physician states that pt can stay off BIPAP.

## 2021-11-09 NOTE — PROGRESS NOTES
Treatment time: 2.5 hours  Net UF: 3100 ml     Pre weight: 81.1 kg  Post weight: 78.9 kg  EDW: TBD kg  Crit Line Used: Yes Ending Profile: A  Refill Present: Yes     11/08/21 2150 11/09/21 0045   Vital Signs   BP (!) 204/102 (!) 184/97   Temp 97.5 °F (36.4 °C) 97.6 °F (36.4 °C)   Pulse 75 75   Resp 24 20   SpO2 100 % 100 %   Height 5' 8\" (1.727 m) 5' 8\" (1.727 m)   Weight 178 lb 12.7 oz (81.1 kg) 173 lb 15.1 oz (78.9 kg)   Weight Method Bed scale Bed scale   Percent Weight Change -0.06 -2.71     Access used: L AVF    Access function: Well with  ml/min     Medications or blood products given: n/a     Regular outpatient schedule: [facility]     Summary of response to treatment: Patient tolerated treatment well and without any complications. Patient remained stable throughout entire treatment and upon the dialysis suite via RN transport. Report given to [facility], RN and copy of dialysis treatment record placed in chart, to be scanned into EMR.

## 2021-11-09 NOTE — ED NOTES
Bed:  2TRA-A2  Expected date:   Expected time:   Means of arrival:   Comments:  Needs bed     Lazaro Sinha RN  11/09/21 6534

## 2021-11-10 LAB — URINE CULTURE, ROUTINE: NORMAL

## 2021-11-12 ENCOUNTER — HOSPITAL ENCOUNTER (INPATIENT)
Age: 61
LOS: 1 days | Discharge: LEFT BEFORE DISCHARGE | DRG: 291 | End: 2021-11-13
Attending: EMERGENCY MEDICINE | Admitting: INTERNAL MEDICINE
Payer: COMMERCIAL

## 2021-11-12 ENCOUNTER — APPOINTMENT (OUTPATIENT)
Dept: GENERAL RADIOLOGY | Age: 61
DRG: 291 | End: 2021-11-12
Payer: COMMERCIAL

## 2021-11-12 DIAGNOSIS — Z99.2 ESRD (END STAGE RENAL DISEASE) ON DIALYSIS (HCC): ICD-10-CM

## 2021-11-12 DIAGNOSIS — N18.6 ESRD (END STAGE RENAL DISEASE) ON DIALYSIS (HCC): ICD-10-CM

## 2021-11-12 DIAGNOSIS — E87.5 HYPERKALEMIA: Primary | ICD-10-CM

## 2021-11-12 DIAGNOSIS — E87.79 OTHER HYPERVOLEMIA: ICD-10-CM

## 2021-11-12 LAB
A/G RATIO: 1.1 (ref 1.1–2.2)
ALBUMIN SERPL-MCNC: 4.4 G/DL (ref 3.4–5)
ALP BLD-CCNC: 83 U/L (ref 40–129)
ALT SERPL-CCNC: 12 U/L (ref 10–40)
ANION GAP SERPL CALCULATED.3IONS-SCNC: 22 MMOL/L (ref 3–16)
AST SERPL-CCNC: 15 U/L (ref 15–37)
BASE EXCESS VENOUS: -9.9 MMOL/L (ref -2–3)
BASOPHILS ABSOLUTE: 0 K/UL (ref 0–0.2)
BASOPHILS RELATIVE PERCENT: 0.2 %
BILIRUB SERPL-MCNC: 0.3 MG/DL (ref 0–1)
BUN BLDV-MCNC: 68 MG/DL (ref 7–20)
CALCIUM SERPL-MCNC: 8.3 MG/DL (ref 8.3–10.6)
CARBOXYHEMOGLOBIN: 2.9 % (ref 0–1.5)
CHLORIDE BLD-SCNC: 99 MMOL/L (ref 99–110)
CO2: 17 MMOL/L (ref 21–32)
CREAT SERPL-MCNC: 9.8 MG/DL (ref 0.9–1.3)
EOSINOPHILS ABSOLUTE: 0.6 K/UL (ref 0–0.6)
EOSINOPHILS RELATIVE PERCENT: 4.7 %
GFR AFRICAN AMERICAN: 7
GFR NON-AFRICAN AMERICAN: 5
GLUCOSE BLD-MCNC: 248 MG/DL (ref 70–99)
HCO3 VENOUS: 20.8 MMOL/L (ref 24–28)
HCT VFR BLD CALC: 39.5 % (ref 40.5–52.5)
HEMOGLOBIN, VEN, REDUCED: 28.4 %
HEMOGLOBIN: 12.4 G/DL (ref 13.5–17.5)
LYMPHOCYTES ABSOLUTE: 2.9 K/UL (ref 1–5.1)
LYMPHOCYTES RELATIVE PERCENT: 23.6 %
MCH RBC QN AUTO: 29 PG (ref 26–34)
MCHC RBC AUTO-ENTMCNC: 31.4 G/DL (ref 31–36)
MCV RBC AUTO: 92.2 FL (ref 80–100)
METHEMOGLOBIN VENOUS: 0.6 % (ref 0–1.5)
MONOCYTES ABSOLUTE: 0.9 K/UL (ref 0–1.3)
MONOCYTES RELATIVE PERCENT: 7.6 %
NEUTROPHILS ABSOLUTE: 7.9 K/UL (ref 1.7–7.7)
NEUTROPHILS RELATIVE PERCENT: 63.9 %
O2 SAT, VEN: 71 %
PCO2, VEN: 67.8 MMHG (ref 41–51)
PDW BLD-RTO: 19.3 % (ref 12.4–15.4)
PH VENOUS: 7.09 (ref 7.35–7.45)
PLATELET # BLD: 466 K/UL (ref 135–450)
PMV BLD AUTO: 8.3 FL (ref 5–10.5)
PO2, VEN: 52.2 MMHG (ref 25–40)
POTASSIUM REFLEX MAGNESIUM: 6.7 MMOL/L (ref 3.5–5.1)
PRO-BNP: ABNORMAL PG/ML (ref 0–124)
RBC # BLD: 4.29 M/UL (ref 4.2–5.9)
SODIUM BLD-SCNC: 138 MMOL/L (ref 136–145)
TCO2 CALC VENOUS: 23 MMOL/L
TOTAL PROTEIN: 8.5 G/DL (ref 6.4–8.2)
TROPONIN: 0.3 NG/ML
WBC # BLD: 12.3 K/UL (ref 4–11)

## 2021-11-12 PROCEDURE — 96365 THER/PROPH/DIAG IV INF INIT: CPT

## 2021-11-12 PROCEDURE — 80053 COMPREHEN METABOLIC PANEL: CPT

## 2021-11-12 PROCEDURE — 83880 ASSAY OF NATRIURETIC PEPTIDE: CPT

## 2021-11-12 PROCEDURE — 99285 EMERGENCY DEPT VISIT HI MDM: CPT

## 2021-11-12 PROCEDURE — 2500000003 HC RX 250 WO HCPCS

## 2021-11-12 PROCEDURE — 94761 N-INVAS EAR/PLS OXIMETRY MLT: CPT

## 2021-11-12 PROCEDURE — 84484 ASSAY OF TROPONIN QUANT: CPT

## 2021-11-12 PROCEDURE — 6370000000 HC RX 637 (ALT 250 FOR IP): Performed by: EMERGENCY MEDICINE

## 2021-11-12 PROCEDURE — 6370000000 HC RX 637 (ALT 250 FOR IP)

## 2021-11-12 PROCEDURE — 71045 X-RAY EXAM CHEST 1 VIEW: CPT

## 2021-11-12 PROCEDURE — 90935 HEMODIALYSIS ONE EVALUATION: CPT

## 2021-11-12 PROCEDURE — 82803 BLOOD GASES ANY COMBINATION: CPT

## 2021-11-12 PROCEDURE — 94660 CPAP INITIATION&MGMT: CPT

## 2021-11-12 PROCEDURE — 2700000000 HC OXYGEN THERAPY PER DAY

## 2021-11-12 PROCEDURE — 36415 COLL VENOUS BLD VENIPUNCTURE: CPT

## 2021-11-12 PROCEDURE — 5A1D70Z PERFORMANCE OF URINARY FILTRATION, INTERMITTENT, LESS THAN 6 HOURS PER DAY: ICD-10-PCS | Performed by: INTERNAL MEDICINE

## 2021-11-12 PROCEDURE — 85025 COMPLETE CBC W/AUTO DIFF WBC: CPT

## 2021-11-12 PROCEDURE — 2000000000 HC ICU R&B

## 2021-11-12 RX ORDER — DEXTROSE MONOHYDRATE 50 MG/ML
100 INJECTION, SOLUTION INTRAVENOUS PRN
Status: DISCONTINUED | OUTPATIENT
Start: 2021-11-12 | End: 2021-11-13 | Stop reason: HOSPADM

## 2021-11-12 RX ORDER — NITROGLYCERIN 20 MG/100ML
INJECTION INTRAVENOUS
Status: COMPLETED
Start: 2021-11-12 | End: 2021-11-12

## 2021-11-12 RX ORDER — DEXTROSE MONOHYDRATE 25 G/50ML
25 INJECTION, SOLUTION INTRAVENOUS ONCE
Status: DISCONTINUED | OUTPATIENT
Start: 2021-11-12 | End: 2021-11-12

## 2021-11-12 RX ORDER — NICOTINE POLACRILEX 4 MG
15 LOZENGE BUCCAL PRN
Status: DISCONTINUED | OUTPATIENT
Start: 2021-11-12 | End: 2021-11-13 | Stop reason: HOSPADM

## 2021-11-12 RX ORDER — DEXTROSE MONOHYDRATE 25 G/50ML
12.5 INJECTION, SOLUTION INTRAVENOUS PRN
Status: DISCONTINUED | OUTPATIENT
Start: 2021-11-12 | End: 2021-11-13 | Stop reason: HOSPADM

## 2021-11-12 RX ORDER — CALCIUM GLUCONATE 94 MG/ML
1000 INJECTION, SOLUTION INTRAVENOUS ONCE
Status: DISCONTINUED | OUTPATIENT
Start: 2021-11-12 | End: 2021-11-12

## 2021-11-12 RX ORDER — NITROGLYCERIN 0.4 MG/1
0.4 TABLET SUBLINGUAL EVERY 5 MIN PRN
Status: DISCONTINUED | OUTPATIENT
Start: 2021-11-12 | End: 2021-11-13 | Stop reason: HOSPADM

## 2021-11-12 RX ORDER — HEPARIN SODIUM 5000 [USP'U]/ML
5000 INJECTION, SOLUTION INTRAVENOUS; SUBCUTANEOUS EVERY 8 HOURS SCHEDULED
Status: CANCELLED | OUTPATIENT
Start: 2021-11-12

## 2021-11-12 RX ORDER — NITROGLYCERIN 20 MG/100ML
5-200 INJECTION INTRAVENOUS CONTINUOUS
Status: DISCONTINUED | OUTPATIENT
Start: 2021-11-12 | End: 2021-11-13

## 2021-11-12 RX ADMIN — NITROGLYCERIN 0.4 MG: 0.4 TABLET SUBLINGUAL at 20:01

## 2021-11-12 RX ADMIN — NITROGLYCERIN 80 MCG/MIN: 20 INJECTION INTRAVENOUS at 20:06

## 2021-11-12 RX ADMIN — NITROGLYCERIN 0.4 MG: 0.4 TABLET SUBLINGUAL at 20:09

## 2021-11-12 RX ADMIN — NITROGLYCERIN 0.5 INCH: 20 OINTMENT TOPICAL at 19:45

## 2021-11-12 RX ADMIN — NITROGLYCERIN 0.4 MG: 0.4 TABLET SUBLINGUAL at 19:53

## 2021-11-12 ASSESSMENT — ENCOUNTER SYMPTOMS
ABDOMINAL PAIN: 0
COUGH: 0
ABDOMINAL DISTENTION: 0
SHORTNESS OF BREATH: 1

## 2021-11-12 ASSESSMENT — PAIN SCALES - GENERAL: PAINLEVEL_OUTOF10: 0

## 2021-11-13 VITALS
HEART RATE: 83 BPM | BODY MASS INDEX: 22.79 KG/M2 | SYSTOLIC BLOOD PRESSURE: 148 MMHG | DIASTOLIC BLOOD PRESSURE: 75 MMHG | OXYGEN SATURATION: 100 % | TEMPERATURE: 98 F | WEIGHT: 149.91 LBS | RESPIRATION RATE: 16 BRPM

## 2021-11-13 LAB
ALBUMIN SERPL-MCNC: 3.9 G/DL (ref 3.4–5)
ANION GAP SERPL CALCULATED.3IONS-SCNC: 13 MMOL/L (ref 3–16)
ANION GAP SERPL CALCULATED.3IONS-SCNC: 18 MMOL/L (ref 3–16)
APTT: 34.1 SEC (ref 26.2–38.6)
BASE EXCESS VENOUS: -3 (ref -3–3)
BASOPHILS ABSOLUTE: 0.1 K/UL (ref 0–0.2)
BASOPHILS RELATIVE PERCENT: 1.1 %
BUN BLDV-MCNC: 16 MG/DL (ref 7–20)
BUN BLDV-MCNC: 39 MG/DL (ref 7–20)
CALCIUM SERPL-MCNC: 6.7 MG/DL (ref 8.3–10.6)
CALCIUM SERPL-MCNC: 8.1 MG/DL (ref 8.3–10.6)
CHLORIDE BLD-SCNC: 103 MMOL/L (ref 99–110)
CHLORIDE BLD-SCNC: 99 MMOL/L (ref 99–110)
CO2: 20 MMOL/L (ref 21–32)
CO2: 20 MMOL/L (ref 21–32)
CREAT SERPL-MCNC: 3.7 MG/DL (ref 0.9–1.3)
CREAT SERPL-MCNC: 5.8 MG/DL (ref 0.9–1.3)
EOSINOPHILS ABSOLUTE: 0.2 K/UL (ref 0–0.6)
EOSINOPHILS RELATIVE PERCENT: 2.1 %
GFR AFRICAN AMERICAN: 12
GFR AFRICAN AMERICAN: 20
GFR NON-AFRICAN AMERICAN: 10
GFR NON-AFRICAN AMERICAN: 17
GLUCOSE BLD-MCNC: 152 MG/DL (ref 70–99)
GLUCOSE BLD-MCNC: 171 MG/DL (ref 70–99)
GLUCOSE BLD-MCNC: 206 MG/DL (ref 70–99)
GLUCOSE BLD-MCNC: 206 MG/DL (ref 70–99)
GLUCOSE BLD-MCNC: 226 MG/DL (ref 70–99)
HCO3 VENOUS: 22.5 MMOL/L (ref 23–29)
HCT VFR BLD CALC: 27.3 % (ref 40.5–52.5)
HCT VFR BLD CALC: 31.2 % (ref 40.5–52.5)
HEMOGLOBIN: 10.2 G/DL (ref 13.5–17.5)
HEMOGLOBIN: 8.7 G/DL (ref 13.5–17.5)
INR BLD: 1.09 (ref 0.88–1.12)
LYMPHOCYTES ABSOLUTE: 0.8 K/UL (ref 1–5.1)
LYMPHOCYTES RELATIVE PERCENT: 9.6 %
MAGNESIUM: 2.1 MG/DL (ref 1.8–2.4)
MCH RBC QN AUTO: 29 PG (ref 26–34)
MCHC RBC AUTO-ENTMCNC: 32.6 G/DL (ref 31–36)
MCV RBC AUTO: 89.1 FL (ref 80–100)
MONOCYTES ABSOLUTE: 0.7 K/UL (ref 0–1.3)
MONOCYTES RELATIVE PERCENT: 8.1 %
NEUTROPHILS ABSOLUTE: 6.8 K/UL (ref 1.7–7.7)
NEUTROPHILS RELATIVE PERCENT: 79.1 %
O2 SAT, VEN: 78 %
PCO2, VEN: 37 MM HG (ref 40–50)
PDW BLD-RTO: 18.8 % (ref 12.4–15.4)
PERFORMED ON: ABNORMAL
PH VENOUS: 7.39 (ref 7.35–7.45)
PHOSPHORUS: 5.4 MG/DL (ref 2.5–4.9)
PLATELET # BLD: 336 K/UL (ref 135–450)
PMV BLD AUTO: 8.3 FL (ref 5–10.5)
PO2, VEN: 43 MM HG
POC SAMPLE TYPE: ABNORMAL
POTASSIUM SERPL-SCNC: 3.9 MMOL/L (ref 3.5–5.1)
POTASSIUM SERPL-SCNC: 4.4 MMOL/L (ref 3.5–5.1)
PROTHROMBIN TIME: 12.4 SEC (ref 9.9–12.7)
RBC # BLD: 3.5 M/UL (ref 4.2–5.9)
SODIUM BLD-SCNC: 136 MMOL/L (ref 136–145)
SODIUM BLD-SCNC: 137 MMOL/L (ref 136–145)
TCO2 CALC VENOUS: 24 MMOL/L
TROPONIN: 0.22 NG/ML
TROPONIN: 0.28 NG/ML
TROPONIN: 0.3 NG/ML
WBC # BLD: 8.6 K/UL (ref 4–11)

## 2021-11-13 PROCEDURE — 85730 THROMBOPLASTIN TIME PARTIAL: CPT

## 2021-11-13 PROCEDURE — 83735 ASSAY OF MAGNESIUM: CPT

## 2021-11-13 PROCEDURE — 6370000000 HC RX 637 (ALT 250 FOR IP): Performed by: STUDENT IN AN ORGANIZED HEALTH CARE EDUCATION/TRAINING PROGRAM

## 2021-11-13 PROCEDURE — 85610 PROTHROMBIN TIME: CPT

## 2021-11-13 PROCEDURE — 90935 HEMODIALYSIS ONE EVALUATION: CPT

## 2021-11-13 PROCEDURE — 80069 RENAL FUNCTION PANEL: CPT

## 2021-11-13 PROCEDURE — 99223 1ST HOSP IP/OBS HIGH 75: CPT | Performed by: INTERNAL MEDICINE

## 2021-11-13 PROCEDURE — 99222 1ST HOSP IP/OBS MODERATE 55: CPT | Performed by: INTERNAL MEDICINE

## 2021-11-13 PROCEDURE — 2580000003 HC RX 258: Performed by: STUDENT IN AN ORGANIZED HEALTH CARE EDUCATION/TRAINING PROGRAM

## 2021-11-13 PROCEDURE — 84484 ASSAY OF TROPONIN QUANT: CPT

## 2021-11-13 PROCEDURE — 93005 ELECTROCARDIOGRAM TRACING: CPT

## 2021-11-13 PROCEDURE — 36415 COLL VENOUS BLD VENIPUNCTURE: CPT

## 2021-11-13 PROCEDURE — 82803 BLOOD GASES ANY COMBINATION: CPT

## 2021-11-13 PROCEDURE — 85018 HEMOGLOBIN: CPT

## 2021-11-13 PROCEDURE — 85025 COMPLETE CBC W/AUTO DIFF WBC: CPT

## 2021-11-13 PROCEDURE — 85014 HEMATOCRIT: CPT

## 2021-11-13 PROCEDURE — 6360000002 HC RX W HCPCS: Performed by: STUDENT IN AN ORGANIZED HEALTH CARE EDUCATION/TRAINING PROGRAM

## 2021-11-13 RX ORDER — CARVEDILOL 25 MG/1
25 TABLET ORAL 2 TIMES DAILY WITH MEALS
Status: DISCONTINUED | OUTPATIENT
Start: 2021-11-13 | End: 2021-11-13 | Stop reason: HOSPADM

## 2021-11-13 RX ORDER — NIFEDIPINE 30 MG/1
60 TABLET, FILM COATED, EXTENDED RELEASE ORAL 2 TIMES DAILY
Status: DISCONTINUED | OUTPATIENT
Start: 2021-11-13 | End: 2021-11-13 | Stop reason: HOSPADM

## 2021-11-13 RX ORDER — GABAPENTIN 100 MG/1
100 CAPSULE ORAL NIGHTLY
Status: DISCONTINUED | OUTPATIENT
Start: 2021-11-13 | End: 2021-11-13 | Stop reason: HOSPADM

## 2021-11-13 RX ORDER — INSULIN LISPRO 100 [IU]/ML
0-6 INJECTION, SOLUTION INTRAVENOUS; SUBCUTANEOUS
Status: DISCONTINUED | OUTPATIENT
Start: 2021-11-13 | End: 2021-11-13 | Stop reason: HOSPADM

## 2021-11-13 RX ORDER — ONDANSETRON 4 MG/1
4 TABLET, ORALLY DISINTEGRATING ORAL EVERY 8 HOURS PRN
Status: DISCONTINUED | OUTPATIENT
Start: 2021-11-13 | End: 2021-11-13 | Stop reason: HOSPADM

## 2021-11-13 RX ORDER — SODIUM CHLORIDE 0.9 % (FLUSH) 0.9 %
5-40 SYRINGE (ML) INJECTION PRN
Status: DISCONTINUED | OUTPATIENT
Start: 2021-11-13 | End: 2021-11-13 | Stop reason: HOSPADM

## 2021-11-13 RX ORDER — INSULIN LISPRO 100 [IU]/ML
0-3 INJECTION, SOLUTION INTRAVENOUS; SUBCUTANEOUS NIGHTLY
Status: DISCONTINUED | OUTPATIENT
Start: 2021-11-13 | End: 2021-11-13 | Stop reason: HOSPADM

## 2021-11-13 RX ORDER — ACETAMINOPHEN 325 MG/1
650 TABLET ORAL EVERY 6 HOURS PRN
Status: DISCONTINUED | OUTPATIENT
Start: 2021-11-13 | End: 2021-11-13 | Stop reason: HOSPADM

## 2021-11-13 RX ORDER — ONDANSETRON 2 MG/ML
4 INJECTION INTRAMUSCULAR; INTRAVENOUS EVERY 6 HOURS PRN
Status: DISCONTINUED | OUTPATIENT
Start: 2021-11-13 | End: 2021-11-13 | Stop reason: HOSPADM

## 2021-11-13 RX ORDER — DULOXETIN HYDROCHLORIDE 60 MG/1
60 CAPSULE, DELAYED RELEASE ORAL DAILY
Status: DISCONTINUED | OUTPATIENT
Start: 2021-11-13 | End: 2021-11-13 | Stop reason: HOSPADM

## 2021-11-13 RX ORDER — SODIUM CHLORIDE 0.9 % (FLUSH) 0.9 %
5-40 SYRINGE (ML) INJECTION EVERY 12 HOURS SCHEDULED
Status: DISCONTINUED | OUTPATIENT
Start: 2021-11-13 | End: 2021-11-13 | Stop reason: HOSPADM

## 2021-11-13 RX ORDER — ACETAMINOPHEN 650 MG/1
650 SUPPOSITORY RECTAL EVERY 6 HOURS PRN
Status: DISCONTINUED | OUTPATIENT
Start: 2021-11-13 | End: 2021-11-13 | Stop reason: HOSPADM

## 2021-11-13 RX ORDER — NICOTINE POLACRILEX 4 MG
15 LOZENGE BUCCAL PRN
Status: DISCONTINUED | OUTPATIENT
Start: 2021-11-13 | End: 2021-11-13 | Stop reason: SDUPTHER

## 2021-11-13 RX ORDER — ATORVASTATIN CALCIUM 40 MG/1
40 TABLET, FILM COATED ORAL DAILY
Status: DISCONTINUED | OUTPATIENT
Start: 2021-11-13 | End: 2021-11-13 | Stop reason: HOSPADM

## 2021-11-13 RX ORDER — SODIUM CHLORIDE 9 MG/ML
25 INJECTION, SOLUTION INTRAVENOUS PRN
Status: DISCONTINUED | OUTPATIENT
Start: 2021-11-13 | End: 2021-11-13 | Stop reason: HOSPADM

## 2021-11-13 RX ORDER — WARFARIN SODIUM 5 MG/1
10 TABLET ORAL
Status: DISCONTINUED | OUTPATIENT
Start: 2021-11-13 | End: 2021-11-13 | Stop reason: HOSPADM

## 2021-11-13 RX ORDER — VITAMIN B COMPLEX
1000 TABLET ORAL DAILY
Status: DISCONTINUED | OUTPATIENT
Start: 2021-11-13 | End: 2021-11-13 | Stop reason: HOSPADM

## 2021-11-13 RX ORDER — SEVELAMER CARBONATE 800 MG/1
1600 TABLET, FILM COATED ORAL
Status: DISCONTINUED | OUTPATIENT
Start: 2021-11-13 | End: 2021-11-13 | Stop reason: HOSPADM

## 2021-11-13 RX ORDER — ASPIRIN 81 MG/1
81 TABLET ORAL DAILY
Status: DISCONTINUED | OUTPATIENT
Start: 2021-11-13 | End: 2021-11-13 | Stop reason: HOSPADM

## 2021-11-13 RX ORDER — TRAZODONE HYDROCHLORIDE 50 MG/1
50 TABLET ORAL NIGHTLY
Status: DISCONTINUED | OUTPATIENT
Start: 2021-11-13 | End: 2021-11-13 | Stop reason: HOSPADM

## 2021-11-13 RX ORDER — DEXTROSE MONOHYDRATE 25 G/50ML
12.5 INJECTION, SOLUTION INTRAVENOUS PRN
Status: DISCONTINUED | OUTPATIENT
Start: 2021-11-13 | End: 2021-11-13 | Stop reason: SDUPTHER

## 2021-11-13 RX ORDER — ACETAMINOPHEN 500 MG
1000 TABLET ORAL
Status: DISCONTINUED | OUTPATIENT
Start: 2021-11-13 | End: 2021-11-13 | Stop reason: HOSPADM

## 2021-11-13 RX ORDER — DEXTROSE MONOHYDRATE 50 MG/ML
100 INJECTION, SOLUTION INTRAVENOUS PRN
Status: DISCONTINUED | OUTPATIENT
Start: 2021-11-13 | End: 2021-11-13 | Stop reason: SDUPTHER

## 2021-11-13 RX ADMIN — CARVEDILOL 25 MG: 25 TABLET, FILM COATED ORAL at 07:37

## 2021-11-13 RX ADMIN — ATORVASTATIN CALCIUM 40 MG: 40 TABLET, FILM COATED ORAL at 07:36

## 2021-11-13 RX ADMIN — INSULIN LISPRO 1 UNITS: 100 INJECTION, SOLUTION INTRAVENOUS; SUBCUTANEOUS at 03:11

## 2021-11-13 RX ADMIN — INSULIN LISPRO 1 UNITS: 100 INJECTION, SOLUTION INTRAVENOUS; SUBCUTANEOUS at 07:43

## 2021-11-13 RX ADMIN — ACETAMINOPHEN 650 MG: 325 TABLET ORAL at 03:11

## 2021-11-13 RX ADMIN — ONDANSETRON 4 MG: 2 INJECTION INTRAMUSCULAR; INTRAVENOUS at 03:11

## 2021-11-13 RX ADMIN — DULOXETINE HYDROCHLORIDE 60 MG: 60 CAPSULE, DELAYED RELEASE ORAL at 07:36

## 2021-11-13 RX ADMIN — SEVELAMER CARBONATE 1600 MG: 800 TABLET, FILM COATED ORAL at 07:36

## 2021-11-13 RX ADMIN — SEVELAMER CARBONATE 1600 MG: 800 TABLET, FILM COATED ORAL at 12:15

## 2021-11-13 RX ADMIN — ASPIRIN 81 MG: 81 TABLET, COATED ORAL at 07:36

## 2021-11-13 RX ADMIN — INSULIN GLARGINE 15 UNITS: 100 INJECTION, SOLUTION SUBCUTANEOUS at 03:11

## 2021-11-13 RX ADMIN — Medication 1000 UNITS: at 07:37

## 2021-11-13 RX ADMIN — Medication 10 ML: at 09:07

## 2021-11-13 RX ADMIN — NIFEDIPINE 60 MG: 30 TABLET, EXTENDED RELEASE ORAL at 07:36

## 2021-11-13 RX ADMIN — TRAZODONE HYDROCHLORIDE 50 MG: 50 TABLET, FILM COATED ORAL at 02:44

## 2021-11-13 RX ADMIN — INSULIN LISPRO 1 UNITS: 100 INJECTION, SOLUTION INTRAVENOUS; SUBCUTANEOUS at 12:14

## 2021-11-13 ASSESSMENT — PAIN SCALES - GENERAL
PAINLEVEL_OUTOF10: 0
PAINLEVEL_OUTOF10: 7
PAINLEVEL_OUTOF10: 3

## 2021-11-13 ASSESSMENT — ENCOUNTER SYMPTOMS
WHEEZING: 1
GASTROINTESTINAL NEGATIVE: 1
SHORTNESS OF BREATH: 1

## 2021-11-13 ASSESSMENT — PAIN DESCRIPTION - LOCATION: LOCATION: HEAD

## 2021-11-13 ASSESSMENT — PAIN DESCRIPTION - PAIN TYPE: TYPE: OTHER (COMMENT)

## 2021-11-13 ASSESSMENT — PAIN DESCRIPTION - FREQUENCY: FREQUENCY: CONTINUOUS

## 2021-11-13 NOTE — PROGRESS NOTES
Physician Progress Note      Dhara Agrawal  [facility] #:                  [id number]  :                       1960  ADMIT DATE:       2021 7:40 PM  [address] [place] DATE:  RESPONDING  PROVIDER #:        Kyra Mascorro MD          QUERY TEXT:    Patient admitted with acute resp failure & flash pulm edema, with Pro-BNP:   >70,000. Pt noted to have diastolic CHF on August admission. If possible,   please document in progress notes and discharge summary if you are evaluating   and/or treating any of the following: The medical record reflects the following:  Risk Factors: LVH, HTN  Clinical Indicators: Per Pulmonology & Attending 2021 admission for   pericarditis \"Heart failure with preserved EF. Echo on 8/3 show EF 55% with no   wall motion abnormalities  But grade 2 DD. \" 2021 ECHO: 55%, G2DD, No   regional wall motion abnormalities, moderate mitral regurgitation,  left   atrium is severely dilated. Pro-BNP >70,000 since 2021. Treatment: Scheduled Metoprolol, BiPAP, HD, NTG gtt  Options provided:  -- Acute pulmonary edema due to Acute on Chronic Diastolic CHF/HFpEF, POA  -- Acute pulmonary edema with Chronic Diastolic CHF/HFpEF  -- Acute pulmonary edema, does not have a diagnosis of Diastolic CHF/HFpEF  -- Other - I will add my own diagnosis  -- Disagree - Not applicable / Not valid  -- Disagree - Clinically unable to determine / Unknown  -- Refer to Clinical Documentation Reviewer    PROVIDER RESPONSE TEXT:    This patient has Acute pulmonary edema due to diastolic CHF/HFpEF   exacerbation, POA. Query created by:  Donah Peabody on 2021 10:13 AM      Electronically signed by:  Kyra Mascorro MD 2021 11:48 AM

## 2021-11-13 NOTE — PROGRESS NOTES
Patient stated, \" I cannot stay the night tonight, I have to go home to take care of my wife, I don't want to be transferred to the floor. I will sign AMA. \"     Dr. Yousuf Mittal at bedside with patient. AMA paperwork signed and placed in chart. IV removed. Patient leaving with fistula intact in left UE, dressed and taped from HD this morning. Education provided to patient.

## 2021-11-13 NOTE — FLOWSHEET NOTE
Treatment time: 3 hrs    Net UF: 3 liters    Pre weight: 71.4 kg   Post weight: 68 kg   EDW: 68 kg (New EDW)    Access used: LFA AVF  Access function: No issues noted. Medications or blood products given: None    Regular outpatient schedule: MARIBEL PEÑALOZA    Summary of response to treatment: Pt tolerated treatment. No issues noted. Copy of dialysis treatment record faxed to [facility] with new EDW.      Copy of dialysis treatment record placed in chart, to be scanned into EMR.       11/13/21 0835 11/13/21 1200   Treatment   Time On 0846  --    Time Off  --  1147   Vital Signs   Weight 157 lb 6.5 oz (71.4 kg) 149 lb 14.6 oz (68 kg)   Weight Method Standing scale Standing scale   Dialysis Bath   K+ (Potassium) 3  --    Ca+ (Calcium) 2.5  --    Na+ (Sodium) 138  --    HCO3 (Bicarb) 32  --    Post-Hemodialysis Assessment   Total Liters Processed (l/min)  --  65.2 l/min   Duration of Treatment (minutes)  --  180 minutes   NET Removed (ml)  --  3000 ml

## 2021-11-13 NOTE — PROGRESS NOTES
Physician Progress Note      Sierra Orozco  [facility] #:                  [id number]  :                       1960  ADMIT DATE:       2021 7:40 PM  DISCH DATE:  RESPONDING  PROVIDER #:        Spenser Adrian          QUERY TEXT:    Patient admitted with acute resp failure & flash pulm edema from missed HD and   noted to have SIRS: WBC: 12.3- 8.6, RR: 29- 29. If possible, please document   in progress notes and discharge summary if you are evaluating and/or treating   any of the following: The medical record reflects the following:  Risk Factors: Resp failure and pulm edema  Clinical Indicators:  SIRS: WBC: 12.3- 8.6, RR: 29- 29. Per H&P \"Flash   pulmonary edema due to noncompliance with outpatient hemodialysis; Hypertensive emergency. \" No infectious w/u, (afebrile)  Treatment: BiPAP, HD, NTG gtt  Options provided:  -- SIRS of non-infectious origin due to acute pulmonary edema with acute    hypoxic respiratory failure and HTN emergency  -- Other - I will add my own diagnosis  -- Disagree - Not applicable / Not valid  -- Disagree - Clinically unable to determine / Unknown  -- Refer to Clinical Documentation Reviewer    PROVIDER RESPONSE TEXT:    This patient has SIRS of non-infectious origin due to acute pulmonary edema   with acute hypoxic respiratory failure and HTN emergency. Query created by:  Chuck Bess on 2021 10:01 AM      Electronically signed by:  Spenser Adrian 2021 10:14 AM

## 2021-11-13 NOTE — FLOWSHEET NOTE
Treatment time: 2 hours  Net UF: 3000ml    Pre weight: 74.7kg  Post weight: 71.2kg  EDW: 69.8 (last post treatment weight)    Access used: left forearm fistula  Access function: good    Medications or blood products given: N/A    Regular outpatient schedule: MWF    Summary of response to treatment: patient tolerated treatment well, but remained hypertensive throughout the whole treatment event with nitro gtt. Goal met for this treatment. 11/12/21 2311 11/13/21 0111   Treatment   Time On 2311  --    Time Off  --  0111   Vital Signs   BP (!) 195/93 (!) 164/104   Temp 97.9 °F (36.6 °C) 97.7 °F (36.5 °C)   Pulse 76 85   Resp 17 24   SpO2 96 % 100 %   Weight 164 lb 10.9 oz (74.7 kg) 156 lb 15.5 oz (71.2 kg)   Post-Hemodialysis Assessment   Hemodialysis Intake (ml)  --  400 ml   Hemodialysis Output (ml)  --  3400 ml   NET Removed (ml)  --  3000 ml       Copy of dialysis treatment record placed in chart, to be scanned into EMR.     Electronically signed by Alan Rob RN on 11/13/2021 at 1:25 AM

## 2021-11-13 NOTE — PROGRESS NOTES
Hospitalist Progress Note      PCP: No primary care provider on file. Date of Admission: 11/12/2021    Subjective:   Undergoing HD this am and tolerating w/o problems. Denies any complaints of cp or sob at this time. No abd pain/n/v.     Medications:  Reviewed    Infusion Medications    sodium chloride      dextrose       Scheduled Medications    aspirin  81 mg Oral Daily    atorvastatin  40 mg Oral Daily    carvedilol  25 mg Oral BID WC    DULoxetine  60 mg Oral Daily    gabapentin  100 mg Oral Nightly    insulin glargine  15 Units SubCUTAneous Nightly    insulin lispro  0-6 Units SubCUTAneous TID WC    insulin lispro  0-3 Units SubCUTAneous Nightly    NIFEdipine  60 mg Oral BID    sevelamer  1,600 mg Oral TID WC    traZODone  50 mg Oral Nightly    Vitamin D  1,000 Units Oral Daily    sodium chloride flush  5-40 mL IntraVENous 2 times per day    warfarin  10 mg Oral Once    warfarin (COUMADIN) daily dosing (placeholder)   Other See Admin Instructions     PRN Meds: sodium chloride flush, sodium chloride, ondansetron **OR** ondansetron, acetaminophen **OR** acetaminophen, acetaminophen, nitroGLYCERIN, glucose, dextrose, glucagon (rDNA), dextrose      Intake/Output Summary (Last 24 hours) at 11/13/2021 1323  Last data filed at 11/13/2021 1200  Gross per 24 hour   Intake 1040 ml   Output 6800 ml   Net -5760 ml       Physical Exam Performed:    BP (!) 157/78   Pulse 78   Temp 97.8 °F (36.6 °C)   Resp 19   Wt 149 lb 14.6 oz (68 kg)   SpO2 95%   BMI 22.79 kg/m²     Physical Exam  Constitutional:       Appearance: Normal appearance. He is ill-appearing. Eyes:      Extraocular Movements: Extraocular movements intact. Conjunctiva/sclera: Conjunctivae normal.   Cardiovascular:      Rate and Rhythm: Normal rate and regular rhythm. Pulses: Normal pulses. Pulmonary:      Effort: Pulmonary effort is normal. No respiratory distress. Breath sounds: Normal breath sounds.

## 2021-11-13 NOTE — CONSULTS
Clinical Pharmacy Progress Note    Warfarin - Management by Pharmacy    Consult Date(s): 11/13  Consulting Provider(s): Dr. Jalloh Covert / Plan  PAD - Warfarin   Goal INR: 2 - 3   Concurrent Anticoagulants / Antiplatelets: Aspirin   Interactions: No obvious interactions   Current Regimen: Bolus dose today with 10mg and reassess tomorrow   Plan / Rationale:   o INR today= 1.09, subtherapeutic  o Will assess what INR trend is tomorrow and see if need to adjust home regimen    Will monitor pt's clinical status and INR daily, and make dose adjustments as needed. Thanks for consulting pharmacy! Please call with questions [id number]   11/13/2021 1:09 PM      Interval update:    Subjective/Objective: Mr. Samson De León is a 63 y.o. male with a PMHx significant for ESRD on HD TTS, HTN, T2DM, PAD on warfarin, admitted for acute hypoxic respiratory failure 2/2 pulmonary edema. Pharmacy has been consulted to dose warfarin.     Height:   Ht Readings from Last 1 Encounters:   11/09/21 5' 8\" (1.727 m)     Weight:   Wt Readings from Last 1 Encounters:   11/13/21 149 lb 14.6 oz (68 kg)       Prior / Home Warfarin Regimen:  · Current dose is warfarin 2.5 mg on Mondays; 5 mg all other days (per last admission at [facility] on 11/3 and patient)    Date INR Warfarin   11/13 1.09 10mg                    Labs / Ancillary Data:    Recent Labs     11/12/21  1948 11/13/21  0124 11/13/21  0455   INR  --   --  1.09   HGB 12.4* 10.2*  --    * 336  --    LABALBU 4.4 3.9  --    CREATININE 9.8* 5.8*  --

## 2021-11-13 NOTE — PROGRESS NOTES
ICU Progress Note    Admit Date: 11/12/2021  Day: 2  Vent Day: None  IV Access:Peripheral  IV Fluids:None  Vasopressors:None                Antibiotics: None  Diet: ADULT DIET; Regular; 3 carb choices (45 gm/meal); Low Potassium (Less than 3000 mg/day); Low Phosphorus (Less than 1000 mg)    CC:SOB    Interval history:   No overnight issues  Pt denies any fevers, chills, nausea or vomiting. HPI:   Deidre Garcia is a 63 y.o. male PMHx ESRD on HD TTS, HTN, T2DM, PAD on warfarin who presents with SOB. Pt was discharged less than a week ago for drinking 10 beers couple of shots of hard liquor and had to wait for 4 hours in cold to get public transport on way home when he reached home he was having difficulty breathing which prompted him to come to ER. On this admission he had acute hypoxic respiratory failure 2/2 pulmonary edema and volume overload. Pt came via EMS on BiPAP with SOB with missed HD session. Pt denies any fevers, chills, nausea or vomiting. Pt reports last ETOH use on Sunday, has significant past drinking hx but reports he has stopped drinking other than this one episode. Pt is a marijuana user. Reports prior cocaine, crack, quaalude, and other hallucinogens    In the ED, found to be hypertensive to 250s/110s with sat 85%, resp acidosis 7.094/67.8, and K 6.7. Was started on nitro gtt. Also given calcium gluconate and insulin + D50. Was admitted to the ICU for emergent dialysis.     Medications:     Scheduled Meds:   aspirin  81 mg Oral Daily    atorvastatin  40 mg Oral Daily    carvedilol  25 mg Oral BID WC    DULoxetine  60 mg Oral Daily    gabapentin  100 mg Oral Nightly    insulin glargine  15 Units SubCUTAneous Nightly    insulin lispro  0-6 Units SubCUTAneous TID     insulin lispro  0-3 Units SubCUTAneous Nightly    NIFEdipine  60 mg Oral BID    sevelamer  1,600 mg Oral TID WC    traZODone  50 mg Oral Nightly    Vitamin D  1,000 Units Oral Daily    sodium chloride flush  5-40 mL IntraVENous 2 times per day     Continuous Infusions:   sodium chloride      nitroGLYCERIN 100 mcg/min (11/12/21 2057)    dextrose       PRN Meds:sodium chloride flush, sodium chloride, ondansetron **OR** ondansetron, acetaminophen **OR** acetaminophen, nitroGLYCERIN, glucose, dextrose, glucagon (rDNA), dextrose    Objective:   Vitals:   T-max:  Patient Vitals for the past 8 hrs:   BP Temp Temp src Pulse Resp SpO2 Weight   11/13/21 0630 (!) 140/76   85 17 93 %    11/13/21 0620 (!) 166/90   94      11/13/21 0545 (!) 176/94   91 17 94 %    11/13/21 0530 (!) 168/85   87 21 94 %    11/13/21 0515 (!) 171/89   91 17 98 %    11/13/21 0500    90 19 93 %    11/13/21 0445 (!) 173/90   91 11 97 %    11/13/21 0430 (!) 163/84   88 19 94 %    11/13/21 0415 (!) 156/107   90 18 92 %    11/13/21 0400 (!) 167/92 98.2 °F (36.8 °C) Oral 90 22 (!) 89 %    11/13/21 0345 (!) 174/90   91 17 98 %    11/13/21 0330 (!) 157/82   88 17 (!) 89 %    11/13/21 0315 (!) 185/93   89 21 94 %    11/13/21 0300 (!) 201/107   88 15 100 %    11/13/21 0245 (!) 207/101   87 20 99 %    11/13/21 0230 (!) 223/111   88 18 100 %    11/13/21 0215 (!) 199/103   96 18 100 %    11/13/21 0200    85 26 100 %    11/13/21 0145 (!) 183/90   85 16     11/13/21 0130 (!) 165/81   83 15     11/13/21 0115 (!) 163/83   83 16     11/13/21 0111 (!) 164/104 97.7 °F (36.5 °C)  85 24 100 % 156 lb 15.5 oz (71.2 kg)   11/13/21 0100 (!) 164/96   80 14     11/13/21 0045 (!) 170/101   84 16     11/13/21 0032 (!) 171/93   80 17 100 %    11/13/21 0030    81 21     11/13/21 0015 (!) 172/100   82 27     11/13/21 0000 (!) 179/99 98 °F (36.7 °C) Oral 79 13     11/12/21 2345 (!) 192/102   77 18     11/12/21 2330 (!) 169/126   79 18     11/12/21 2315 (!) 191/96   78 24     11/12/21 2311 (!) 195/93 97.9 °F (36.6 °C)  76 17 96 % 164 lb 10.9 oz (74.7 kg)   11/12/21 2300 (!) 188/94   81 22         Intake/Output Summary (Last 24 hours) at 11/13/2021 0655  Last data filed at 11/13/2021 0111  Gross per 24 hour   Intake 400 ml   Output 3400 ml   Net -3000 ml       Review of Systems   Constitutional: Negative. HENT: Negative. Respiratory: Positive for shortness of breath and wheezing. Gastrointestinal: Negative. Genitourinary: Negative. All other systems reviewed and are negative. Physical Exam  Vitals reviewed. Constitutional:       Appearance: Normal appearance. HENT:      Head: Normocephalic. Nose: Nose normal.   Eyes:      Pupils: Pupils are equal, round, and reactive to light. Cardiovascular:      Rate and Rhythm: Normal rate and regular rhythm. Pulses: Normal pulses. Heart sounds: Normal heart sounds. Pulmonary:      Effort: Respiratory distress present. Breath sounds: Wheezing present. Abdominal:      General: Abdomen is flat. Bowel sounds are normal.      Palpations: Abdomen is soft. Musculoskeletal:         General: Normal range of motion. Skin:     General: Skin is warm. Neurological:      General: No focal deficit present. Mental Status: He is alert. LABS:    CBC:   Recent Labs     11/12/21 1948 11/13/21 0124   WBC 12.3* 8.6   HGB 12.4* 10.2*   HCT 39.5* 31.2*   * 336   MCV 92.2 89.1     Renal:    Recent Labs     11/12/21 1948 11/13/21 0124    137   K 6.7* 4.4   CL 99 99   CO2 17* 20*   BUN 68* 39*   CREATININE 9.8* 5.8*   GLUCOSE 248* 206*   CALCIUM 8.3 8.1*   MG  --  2.10   PHOS  --  5.4*   ANIONGAP 22* 18*     Hepatic:   Recent Labs     11/12/21 1948 11/13/21 0124   AST 15  --    ALT 12  --    BILITOT 0.3  --    PROT 8.5*  --    LABALBU 4.4 3.9   ALKPHOS 83  --      Troponin:   Recent Labs     11/12/21 1948 11/13/21 0124 11/13/21 0455   TROPONINI 0.30* 0.30* 0.28*     BNP: No results for input(s): BNP in the last 72 hours. Lipids: No results for input(s): CHOL, HDL in the last 72 hours.     Invalid input(s): LDLCALCU, TRIGLYCERIDE  ABGs:  No results for input(s): PHART, MCM0POZ, PO2ART, DBP2ZVT, BEART, THGBART, C0ODTRQD, DGF4KXP in the last 72 hours. INR:   Recent Labs     11/13/21  0455   INR 1.09     Lactate: No results for input(s): LACTATE in the last 72 hours. Cultures:  -----------------------------------------------------------------  RAD:   XR CHEST PORTABLE   Final Result   Impression:   1. Bilateral airspace disease and increased interstitial markings which could represent pulmonary edema with a differential of atypical infection. .            Assessment/Plan:   Tobias Cardoso is a 63 y.o. male  PMHx ESRD on HD TTS, HTN, T2DM, PAD on warfarin who presents with SOB     Acute hypoxic respiratory failure 2/2 pulmonary edema  Hypertensive emergency from volume overload in the setting of missed HD session. Pt was hospitalized with similar issues just 1 week ago. - Pt is on BiPAP currently. - Nephro consulted- HD during hospital admission   - PT/OT     ESRD on HD TTS  HTN emergency 2/2 volume overload  SBPs into 260s on arrival with pulmonary edema per above. Related to HD noncompliance. Responsive to nitro gtt    - After HD turn off nitro   - nephro consult: HD  - home sevelamer, vitamin D  - UDS  - trend trop q4h - 0.30 > 0.28      Hyperkalemia 2/2 ESRD missed HD: with EKG changes  - s/p calcium gluc, ins/D50  - HD  - repeat morning EKG and K -> K 4.4 this am      AGMA + Resp acidosis: pH 7.094 on arrival   - HD + BiPAP  - rpt VBG     Chronic Health Conditions:  T2DM: home regimen w/ lantus 15 + LDSS  PAD: ASA, warfarin, lipitor  Anx/Dep: cymbalta  Chronic pain: gabapentin  Insomnia: home trazodone        Code Status:Full  FEN: Diet NPO  PPX:  warfarin  DISPO: ICU    Tristan Almeida MD, PGY-1  11/13/21  6:55 AM    This patient has been staffed and discussed with . Patient seen, examined and discussed with the resident and I agree with the assessment and plan.   Briefly, this is a 63 y.o. male  with acute hypoxemic respiratory failure 2/2 volume overload and ESRD. Patient with a lot of social problems who missed one dialysis and came in with hypertensive emergency and acute hypoxemic respiratory failure. Patient currently getting dialysis and is now off his nitro drip. Can transfer out of ICU today to med surg or potentially discharge from my perspective.          Sanford Ortega MD

## 2021-11-13 NOTE — CONSULTS
Office : [phone]     Fax :[phone]       Nephrology Consult Note      Patient's Name: Marie Padilla  10:00 AM  11/13/2021    Reason for Consult:  ESRD       Requesting Physician:  Dr. Robbins      Chief Complaint:    Chief Complaint   Patient presents with    Respiratory Distress       History of Present Ilness:    Marie Padilla is a 63 y.o. male with PMHx ESRD on HD TTS, HTN, T2DM, PAD on warfarin who presents with SOB. Was recently discharged 3 days ago for acute hypoxic respiratory failure 2/2 pulmonary edema and volume overload. Now arrives from EMS on BiPAP with SOB with missed HD session. No other complaints.     In the ED, found to be hypertensive to 250s/110s with sat 85%, resp acidosis 7.094/67.8, and K 6.7. Was started on nitro gtt. Also given calcium gluconate and insulin + D50. Was admitted to the ICU for emergent dialysis.     Had emergent HD last night       I/O last 3 completed shifts: In: 400   Out: 46     Past Medical History:   Diagnosis Date    Chronic kidney disease     Hypertension        History reviewed. No pertinent surgical history. History reviewed. No pertinent family history. reports that he has quit smoking. He has never used smokeless tobacco. He reports previous alcohol use. He reports previous drug use.         Allergies:  Naproxen    Current Medications:    aspirin EC tablet 81 mg, Daily  atorvastatin (LIPITOR) tablet 40 mg, Daily  carvedilol (COREG) tablet 25 mg, BID WC  DULoxetine (CYMBALTA) extended release capsule 60 mg, Daily  gabapentin (NEURONTIN) capsule 100 mg, Nightly  insulin glargine (LANTUS;BASAGLAR) injection pen 15 Units, Nightly  insulin lispro (1 Unit Dial) 0-6 Units, TID WC  insulin lispro (1 Unit Dial) 0-3 Units, Nightly  NIFEdipine (ADALAT CC) extended release tablet 60 mg, BID  sevelamer (RENVELA) tablet 1,600 mg, TID WC  traZODone (DESYREL) tablet 50 mg, Nightly  vitamin D (CHOLECALCIFEROL) tablet 1,000 Units, Daily  sodium chloride flush 0.9 % injection 5-40 mL, 2 times per day  sodium chloride flush 0.9 % injection 5-40 mL, PRN  0.9 % sodium chloride infusion, PRN  ondansetron (ZOFRAN-ODT) disintegrating tablet 4 mg, Q8H PRN   Or  ondansetron (ZOFRAN) injection 4 mg, Q6H PRN  acetaminophen (TYLENOL) tablet 650 mg, Q6H PRN   Or  acetaminophen (TYLENOL) suppository 650 mg, Q6H PRN  acetaminophen (TYLENOL) tablet 1,000 mg, Once PRN  nitroGLYCERIN (NITROSTAT) SL tablet 0.4 mg, Q5 Min PRN  nitroGLYCERIN 50 mg in dextrose 5% 250 mL infusion, Continuous  glucose (GLUTOSE) 40 % oral gel 15 g, PRN  dextrose 50 % IV solution, PRN  glucagon (rDNA) injection 1 mg, PRN  dextrose 5 % solution, PRN        Review of Systems:   14 point ROS obtained but were negative except mentioned in HPI      Physical exam:     Vitals:  /89   Pulse 94   Temp 98.9 °F (37.2 °C) (Oral)   Resp 25   Wt 157 lb 6.5 oz (71.4 kg)   SpO2 95%   BMI 23.93 kg/m²   Constitutional:  OAA X3 NAD  Skin: no rash, turgor wnl  Heent:  eomi, mmm  Neck: no bruits or jvd noted  Cardiovascular:  S1, S2 without m/r/g  Respiratory: CTA B without w/r/r  Abdomen:  +bs, soft, nt, nd  Ext: 1 +  lower extremity edema  Psychiatric: mood and affect appropriate  Musculoskeletal:  Rom, muscular strength intact    Labs:  CBC:   Recent Labs     11/12/21 1948 11/13/21  0124   WBC 12.3* 8.6   HGB 12.4* 10.2*   * 336     BMP:    Recent Labs     11/12/21 1948 11/13/21  0124    137   K 6.7* 4.4   CL 99 99   CO2 17* 20*   BUN 68* 39*   CREATININE 9.8* 5.8*   GLUCOSE 248* 206*     Ca/Mg/Phos:   Recent Labs     11/12/21 1948 11/13/21  0124   CALCIUM 8.3 8.1*   MG  --  2.10   PHOS  --  5.4*     Hepatic:   Recent Labs     11/12/21 1948   AST 15   ALT 12   BILITOT 0.3 ALKPHOS 83     Troponin:   Recent Labs     11/12/21  1948 11/13/21  0124 11/13/21 0455   TROPONINI 0.30* 0.30* 0.28*     BNP: No results for input(s): BNP in the last 72 hours. Lipids: No results for input(s): CHOL, TRIG, HDL, LDLCALC, LABVLDL in the last 72 hours. ABGs: No results for input(s): PHART, PO2ART, LBO3YSF in the last 72 hours. INR:   Recent Labs     11/13/21 0455   INR 1.09     UA:No results for input(s): Raven Forte, GLUCOSEU, BILIRUBINUR, Carlyn Ohm, BLOODU, PHUR, PROTEINU, UROBILINOGEN, NITRU, LEUKOCYTESUR, Liddie Cruise in the last 72 hours. Urine Microscopic: No results for input(s): LABCAST, BACTERIA, COMU, HYALCAST, WBCUA, RBCUA, EPIU in the last 72 hours. Urine Culture: No results for input(s): LABURIN in the last 72 hours. Urine Chemistry: No results for input(s): Demetrio Bio, PROTEINUR, NAUR in the last 72 hours. IMAGING:  XR CHEST PORTABLE   Final Result   Impression:   1. Bilateral airspace disease and increased interstitial markings which could represent pulmonary edema with a differential of atypical infection. .            Assessment/Plan :      1. ESRD   Missed HD   Hyperkalemia   Volume overload   Got emergent HD last night. Will do HD today   Adjust TDW to 67 kg     2. HTN. Better controlled after UF     3. Anemia. Hb 8.7   epogen once BP conrolled     4. Acid- base disorder. correct with HD     5. Electrolytes .  Corrected with HD             D/w primary team      Thank you for allowing us to participate in care of Yvette Pel         Electronically signed by: Catalina Valentino MD, 11/13/2021, 10:00 AM      [facility] [address]  Office : [phone]  Fax :[phone]

## 2021-11-13 NOTE — H&P
ICU HISTORY AND 2025 [facility] Day: 0  ICU Day: 0                                                         Code:Prior  Admit Date: 11/12/2021  PCP: No primary care provider on file. CC: SOB    HISTORY OF PRESENT ILLNESS:   Katharine Mckee is a 63 y.o. male PMHx ESRD on HD TTS, HTN, T2DM, PAD on warfarin who presents with SOB. Was recently discharged 3 days ago for acute hypoxic respiratory failure 2/2 pulmonary edema and volume overload. Now arrives from EMS on BiPAP with SOB with missed HD session. No other complaints. In the ED, found to be hypertensive to 250s/110s with sat 85%, resp acidosis 7.094/67.8, and K 6.7. Was started on nitro gtt. Also given calcium gluconate and insulin + D50. Was admitted to the ICU for emergent dialysis. PAST HISTORY:     Past Medical History:   Diagnosis Date    Chronic kidney disease     Hypertension        History reviewed. No pertinent surgical history. SocialHistory:   The patient lives at home with wife    Alcohol: reports use on Sunday, has significant past drinking hx but reports he has stopped drinking other than this one episode  Illicit drugs: Current marijuana user. Reports prior cocaine, crack, quaalude, and other hallucinogens  Tobacco:  Current <0.5 ppd smoker. 20yr smoking hx    Family History:  History reviewed. No pertinent family history. MEDICATIONS:     No current facility-administered medications on file prior to encounter.      Current Outpatient Medications on File Prior to Encounter   Medication Sig Dispense Refill    atorvastatin (LIPITOR) 40 MG tablet Take 40 mg by mouth daily       warfarin (COUMADIN) 5 MG tablet Take 2.5-5 mg by mouth daily 2.5mg on Mondays, 5mg all other days      carvedilol (COREG) 25 MG tablet Take 25 mg by mouth 2 times daily (with meals)       NIFEdipine (PROCARDIA XL) 60 MG extended release tablet Take 60 mg by mouth 2 times daily      SITagliptin (JANUVIA) 25 MG tablet Take 25 mg by mouth daily      aspirin 81 MG EC tablet Take 81 mg by mouth daily      DULoxetine (CYMBALTA) 60 MG extended release capsule Take 60 mg by mouth daily      gabapentin (NEURONTIN) 100 MG capsule Take 100 mg by mouth nightly.  traZODone (DESYREL) 50 MG tablet Take 50 mg by mouth nightly      vitamin D (CHOLECALCIFEROL) 25 MCG (1000 UT) TABS tablet Take 1,000 Units by mouth daily      insulin glargine (LANTUS) 100 UNIT/ML injection vial Inject 15 Units into the skin nightly      insulin lispro (HUMALOG) 100 UNIT/ML injection vial Inject into the skin 3 times daily (before meals) Sliding scale      sevelamer (RENVELA) 800 MG tablet Take 2 tablets by mouth 3 times daily (with meals) 90 tablet 3         Scheduled Meds:   calcium gluconate  1,000 mg IntraVENous Once    insulin regular  10 Units IntraVENous Once    And    dextrose  25 g IntraVENous Once      Continuous Infusions:   nitroGLYCERIN 100 mcg/min (11/12/21 2057)    dextrose       PRN Meds:nitroGLYCERIN, glucose, dextrose, glucagon (rDNA), dextrose    Allergies: Allergies   Allergen Reactions    Naproxen      Itching of hands and feet       REVIEW OF SYSTEMS:       History obtained from the patient    Review of Systems   Constitutional: Negative for chills and fever. Respiratory: Positive for shortness of breath. Negative for cough. Cardiovascular: Negative for chest pain and leg swelling. Gastrointestinal: Negative for abdominal distention and abdominal pain. Neurological: Positive for headaches. Negative for dizziness and light-headedness. PHYSICAL EXAM:       Vitals: BP (!) 150/96   Pulse 73   Temp 97.1 °F (36.2 °C) (Temporal)   Resp 16   Wt 170 lb 3.2 oz (77.2 kg)   SpO2 100%   BMI 25.88 kg/m²     I/O:  No intake or output data in the 24 hours ending 11/12/21 2225  No intake/output data recorded. No intake/output data recorded.     Physical Examination:     Physical Exam  Constitutional:       General: He is not in acute distress. Eyes:      Extraocular Movements: Extraocular movements intact. Cardiovascular:      Rate and Rhythm: Normal rate and regular rhythm. Pulses: Normal pulses. Heart sounds: Normal heart sounds. Comments: R forearm fistula CDI without erythema  Pulmonary:      Effort: Respiratory distress present. Breath sounds: Wheezing present. No rhonchi or rales. Comments: On BiPAP  Abdominal:      General: Abdomen is flat. Bowel sounds are normal.      Palpations: Abdomen is soft. Musculoskeletal:      Right lower leg: No edema. Left lower leg: No edema. Neurological:      General: No focal deficit present. Mental Status: He is alert and oriented to person, place, and time. Access:                                   -Peripheral Access Day#:1    No results for input(s): PHART, CUG3YHK, PO2ART in the last 72 hours. DATA:       Labs:  CBC:   Recent Labs     11/12/21 1948   WBC 12.3*   HGB 12.4*   HCT 39.5*   *       BMP:   Recent Labs     11/12/21 1948      K 6.7*   CL 99   CO2 17*   BUN 68*   CREATININE 9.8*   GLUCOSE 248*     LFT's:   Recent Labs     11/12/21 1948   AST 15   ALT 12   BILITOT 0.3   ALKPHOS 83     Troponin: No results for input(s): TROPONINI in the last 72 hours. BNP:No results for input(s): BNP in the last 72 hours. ABGs: No results for input(s): PHART, IIN1CVN, PO2ART in the last 72 hours. INR: No results for input(s): INR in the last 72 hours. U/A:No results for input(s): NITRITE, COLORU, PHUR, LABCAST, WBCUA, RBCUA, MUCUS, TRICHOMONAS, YEAST, BACTERIA, CLARITYU, SPECGRAV, LEUKOCYTESUR, UROBILINOGEN, BILIRUBINUR, BLOODU, GLUCOSEU, AMORPHOUS in the last 72 hours. Invalid input(s): KETONESU    XR CHEST PORTABLE    (Results Pending)       EKG: NSR, peaked T waves  Echo (8/3/21): Left ventricular cavity size is normal. There is mild concentric left   ventricular hypertrophy.  Overall left ventricular systolic function appears   normal with an ejection fraction of 55%. No regional wall motion   abnormalities are noted. Diastolic filling parameters suggest grade II   diastolic dysfunction. Mitral annular calcification is present. Mild to   moderate mitral regurgitation is present. The aortic valve leaflets are   slightly thickened /calcified. Mild aortic stenosis with a peak velocity of   2.8m/s and a mean pressure gradient of 17mmHg. Mild to moderate aortic   regurgitation. Mild tricuspid regurgitation. Estimated pulmonary artery   systolic pressure is at 33 mmHg assuming a right atrial pressure of 8 mmHg. The left atrium is severely dilated. There is a small circumferential   pericardial effusion noted mostly near right ventricle. ASSESSMENT AND PLAN:   Marie Padilla is a 63 y.o. male  PMHx ESRD on HD TTS, HTN, T2DM, PAD on warfarin who presents with SOB    Acute hypoxic respiratory failure 2/2 pulmonary edema: r/t hypertensive emergency from volume overload in the setting of missed HD session. Recently hospitalized for same issue  - BiPAP  - HD  - PT/OT    ESRD on HD TTS  HTN emergency 2/2 volume overload: SBPs into 260s on arrival with pulmonary edema per above. Related to HD noncompliance. Responsive to nitro gtt  - cont nitro gtt: titrate to SBP ~190 for first 8hrs, <160 after  - home nifedipine + coreg  - nephro consult: HD  - home sevelamer, vitamin D  - UDS  - trend trop q4h    Hyperkalemia 2/2 ESRD missed HD: with EKG changes  - s/p calcium gluc, ins/D50  - HD  - repeat morning EKG and K    AGMA + Resp acidosis: pH 7.094 on arrival   - HD + BiPAP  - rpt VBG    Chronic Health Conditions:  T2DM: home regimen w/ lantus 15 + LDSS  PAD: ASA, warfarin, lipitor  Anx/Dep: cymbalta  Chronic pain: gabapentin  Insomnia: home trazodone      Code Status:Prior  FEN: Diet NPO  PPX:  warfarin  DISPO: ICU, can likely transfer to floor in AM    This patient has been staffed and discussed with Dr. Owen Lemus. -----------------------------  Lex Rodriguez MD, PGY-1  11/12/2021  10:25 PM

## 2021-11-15 LAB
EKG ATRIAL RATE: 83 BPM
EKG DIAGNOSIS: NORMAL
EKG P AXIS: 61 DEGREES
EKG P-R INTERVAL: 152 MS
EKG Q-T INTERVAL: 428 MS
EKG QRS DURATION: 80 MS
EKG QTC CALCULATION (BAZETT): 502 MS
EKG R AXIS: 39 DEGREES
EKG T AXIS: 101 DEGREES
EKG VENTRICULAR RATE: 83 BPM

## 2021-12-15 NOTE — DISCHARGE SUMMARY
Hospital Medicine Discharge Summary    Patient ID: Barbie Moreno      Patient's PCP: No primary care provider on file. Admit Date: 11/12/2021     Discharge Date: 11/13/2021      Admitting Physician: Devona Holter, MD     Discharge Physician: Gian Milner MD     Discharge Diagnoses: Active Hospital Problems    Diagnosis     Hyperkalemia [E87.5]        The patient was seen and examined on day of discharge and this discharge summary is in conjunction with any daily progress note from day of discharge. Hospital Course:     Acute on chronic diastolic chf w/ flash pulmonary edema  ESRD on HD w/ ineffective fluid removal  Nephro consulted for emergent hd. Now s/p HD overnight and again today. Per HD nurse now - 27 lbs since admission. Hyperkalemia:  2/2 esrd-now resolved s/p HD. Type 2 NSTEMI:  Likely 2/2 demand ischemia as flat troponin trend     IDDM:  Cont basal/ bolus insulin. Physical Exam Performed:     BP (!) 148/75   Pulse 83   Temp 98 °F (36.7 °C) (Oral)   Resp 16   Wt 149 lb 14.6 oz (68 kg)   SpO2 100%   BMI 22.79 kg/m²       General appearance:  No apparent distress, appears stated age and cooperative. HEENT:  Normal cephalic, atraumatic without obvious deformity. Extra ocular muscles intact. Conjunctivae/corneas clear. Neck: Supple, with full range of motion. No jugular venous distention. Trachea midline. Respiratory:  Normal respiratory effort. Clear to auscultation, bilaterally without Rales/Wheezes/Rhonchi. Cardiovascular:  Regular rate and rhythm with normal S1/S2 without murmurs, rubs or gallops. Abdomen: Soft, non-tende  Neurologic:  grossly non-focal.  Psychiatric:  Alert and oriented    Labs:  For convenience and continuity at follow-up the following most recent labs are provided:      CBC:    Lab Results   Component Value Date    WBC 8.6 11/13/2021    HGB 8.7 11/13/2021    HCT 27.3 11/13/2021     11/13/2021       Renal:    Lab Results Component Value Date     11/13/2021    K 3.9 11/13/2021    K 6.7 11/12/2021     11/13/2021    CO2 20 11/13/2021    BUN 16 11/13/2021    CREATININE 3.7 11/13/2021    CALCIUM 6.7 11/13/2021    PHOS 5.4 11/13/2021         Significant Diagnostic Studies    Radiology:   XR CHEST PORTABLE   Final Result   Impression:   1. Bilateral airspace disease and increased interstitial markings which could represent pulmonary edema with a differential of atypical infection. .             Consults:     IP CONSULT TO NEPHROLOGY  IP CONSULT TO HOSPITALIST  IP CONSULT TO CRITICAL CARE  IP CONSULT TO PHARMACY  IP CONSULT TO CRITICAL CARE  IP CONSULT TO CASE MANAGEMENT    Disposition:  Home     Condition at Discharge: Stable    Discharge Instructions/Follow-up:  F/u HD    Code Status:  Prior     Activity: activity as tolerated    Diet: renal diet      Discharge Medications:     Discharge Medication List as of 11/13/2021  5:18 PM           Details   atorvastatin (LIPITOR) 40 MG tablet Take 40 mg by mouth daily Historical Med      warfarin (COUMADIN) 5 MG tablet Take 2.5-5 mg by mouth daily 2.5mg on Mondays, 5mg all other daysHistorical Med      carvedilol (COREG) 25 MG tablet Take 25 mg by mouth 2 times daily (with meals) Historical Med      NIFEdipine (PROCARDIA XL) 60 MG extended release tablet Take 60 mg by mouth 2 times dailyHistorical Med      SITagliptin (JANUVIA) 25 MG tablet Take 25 mg by mouth dailyHistorical Med      aspirin 81 MG EC tablet Take 81 mg by mouth dailyHistorical Med      DULoxetine (CYMBALTA) 60 MG extended release capsule Take 60 mg by mouth dailyHistorical Med      gabapentin (NEURONTIN) 100 MG capsule Take 100 mg by mouth nightly. Historical Med      traZODone (DESYREL) 50 MG tablet Take 50 mg by mouth nightlyHistorical Med      vitamin D (CHOLECALCIFEROL) 25 MCG (1000 UT) TABS tablet Take 1,000 Units by mouth dailyHistorical Med      insulin glargine (LANTUS) 100 UNIT/ML injection vial Inject 15 Units into the skin nightlyHistorical Med      insulin lispro (HUMALOG) 100 UNIT/ML injection vial Inject into the skin 3 times daily (before meals) Sliding scaleHistorical Med      sevelamer (RENVELA) 800 MG tablet Take 2 tablets by mouth 3 times daily (with meals), Disp-90 tablet, R-3Normal           Time Spent on discharge is more than 31 in the examination, evaluation, counseling and review of medications and discharge plan.       Signed:    Chanda Villalba MD   12/15/2021

## 2022-01-01 ENCOUNTER — HOSPITAL ENCOUNTER (EMERGENCY)
Age: 62
End: 2022-11-12
Attending: EMERGENCY MEDICINE
Payer: MEDICARE

## 2022-01-01 DIAGNOSIS — I46.9 CARDIAC ARREST (HCC): Primary | ICD-10-CM

## 2022-01-01 PROCEDURE — 36415 COLL VENOUS BLD VENIPUNCTURE: CPT

## 2022-01-01 PROCEDURE — 99284 EMERGENCY DEPT VISIT MOD MDM: CPT

## 2022-01-01 PROCEDURE — 92950 HEART/LUNG RESUSCITATION CPR: CPT

## 2022-01-01 PROCEDURE — 6360000002 HC RX W HCPCS: Performed by: EMERGENCY MEDICINE

## 2022-01-01 PROCEDURE — 94761 N-INVAS EAR/PLS OXIMETRY MLT: CPT

## 2022-01-01 PROCEDURE — 2700000000 HC OXYGEN THERAPY PER DAY

## 2022-01-01 PROCEDURE — 2500000003 HC RX 250 WO HCPCS: Performed by: EMERGENCY MEDICINE

## 2022-01-01 RX ORDER — CALCIUM CHLORIDE 100 MG/ML
INJECTION INTRAVENOUS; INTRAVENTRICULAR DAILY PRN
Status: COMPLETED | OUTPATIENT
Start: 2022-01-01 | End: 2022-01-01

## 2022-01-01 RX ORDER — EPINEPHRINE 0.1 MG/ML
SYRINGE (ML) INJECTION DAILY PRN
Status: COMPLETED | OUTPATIENT
Start: 2022-01-01 | End: 2022-01-01

## 2022-01-01 RX ADMIN — EPINEPHRINE 1 MG: 0.1 INJECTION, SOLUTION ENDOTRACHEAL; INTRACARDIAC; INTRAVENOUS at 04:59

## 2022-01-01 RX ADMIN — SODIUM BICARBONATE 50 MEQ: 84 INJECTION, SOLUTION INTRAVENOUS at 04:53

## 2022-01-01 RX ADMIN — CALCIUM CHLORIDE 1000 MG: 100 INJECTION, SOLUTION INTRAVENOUS at 04:54

## 2022-01-01 RX ADMIN — EPINEPHRINE 1 MG: 0.1 INJECTION, SOLUTION ENDOTRACHEAL; INTRACARDIAC; INTRAVENOUS at 05:02

## 2022-01-01 RX ADMIN — EPINEPHRINE 1 MG: 0.1 INJECTION, SOLUTION ENDOTRACHEAL; INTRACARDIAC; INTRAVENOUS at 04:54

## 2022-01-03 LAB
EKG ATRIAL RATE: 85 BPM
EKG DIAGNOSIS: NORMAL
EKG P AXIS: 57 DEGREES
EKG P-R INTERVAL: 170 MS
EKG Q-T INTERVAL: 398 MS
EKG QRS DURATION: 102 MS
EKG QTC CALCULATION (BAZETT): 473 MS
EKG R AXIS: 4 DEGREES
EKG T AXIS: 90 DEGREES
EKG VENTRICULAR RATE: 85 BPM

## 2022-03-11 NOTE — ED PROVIDER NOTES
[address] [address]          ATTENDING PHYSICIAN NOTE       Date of evaluation: 11/12/2021    Chief Complaint     Respiratory Distress      History of Present Illness     Samson De León is a 63 y.o. male who presents in respiratory distress. Patient unable to offer any information about the events leading up to his presentation. EMS reports that they arrived to the house to find him in respiratory distress with undetectable saturations and unable to get a blood pressure. They immediately placed him on CPAP and noticed his O2 sats to be in the 60s in route. He had a blood pressure that was quite elevated at 607 systolic. He did not give any medications in route. Review of Systems     Review of Systems   Unable to perform ROS: Severe respiratory distress       Past Medical, Surgical, Family, and Social History     He has a past medical history of Chronic kidney disease and Hypertension. He has no past surgical history on file. His family history is not on file. He reports that he has quit smoking. He has never used smokeless tobacco. He reports previous alcohol use. He reports previous drug use. Medications     Previous Medications    ASPIRIN 81 MG EC TABLET    Take 81 mg by mouth daily    ATORVASTATIN (LIPITOR) 40 MG TABLET    Take 40 mg by mouth daily     CARVEDILOL (COREG) 25 MG TABLET    Take 25 mg by mouth 2 times daily (with meals)     DULOXETINE (CYMBALTA) 60 MG EXTENDED RELEASE CAPSULE    Take 60 mg by mouth daily    GABAPENTIN (NEURONTIN) 100 MG CAPSULE    Take 100 mg by mouth nightly.     INSULIN GLARGINE (LANTUS) 100 UNIT/ML INJECTION VIAL    Inject 15 Units into the skin nightly    INSULIN LISPRO (HUMALOG) 100 UNIT/ML INJECTION VIAL    Inject into the skin 3 times daily (before meals) Sliding scale    NIFEDIPINE (PROCARDIA XL) 60 MG EXTENDED RELEASE TABLET    Take 60 mg by mouth 2 times daily    SEVELAMER (RENVELA) 800 MG TABLET    Take 2 tablets by mouth 3 times daily (with meals)    SITAGLIPTIN (JANUVIA) 25 MG TABLET    Take 25 mg by mouth daily    TRAZODONE (DESYREL) 50 MG TABLET    Take 50 mg by mouth nightly    VITAMIN D (CHOLECALCIFEROL) 25 MCG (1000 UT) TABS TABLET    Take 1,000 Units by mouth daily    WARFARIN (COUMADIN) 5 MG TABLET    Take 2.5-5 mg by mouth daily 2.5mg on Mondays, 5mg all other days       Allergies     He is allergic to naproxen. Physical Exam     INITIAL VITALS: BP: (!) 256/114, Temp: 97.1 °F (36.2 °C), Pulse: 86, Resp: 29, SpO2: (!) 85 %   Physical Exam  Vitals and nursing note reviewed. Constitutional:       General: He is in acute distress. Appearance: He is well-developed. HENT:      Head: Normocephalic and atraumatic. Eyes:      Extraocular Movements: Extraocular movements intact. Conjunctiva/sclera: Conjunctivae normal.      Pupils: Pupils are equal, round, and reactive to light. Neck:      Vascular: JVD present. Cardiovascular:      Rate and Rhythm: Normal rate and regular rhythm. Heart sounds: Normal heart sounds. No murmur heard. Pulmonary:      Effort: Respiratory distress present. Breath sounds: Rales (full fields) present. No wheezing. Abdominal:      General: There is no distension. Palpations: Abdomen is soft. Tenderness: There is no abdominal tenderness. Musculoskeletal:         General: Normal range of motion. Cervical back: Normal range of motion. Skin:     General: Skin is warm and dry. Neurological:      Cranial Nerves: No cranial nerve deficit. Motor: No weakness.       Coordination: Coordination normal.         Diagnostic Results     EKG   EKG Interpretation    Interpreted by emergency department physician    Rhythm: normal sinus   Rate: normal  Axis: normal  Ectopy: none  Conduction: normal  ST Segments: normal  T Waves: normal  Q Waves: none    Clinical Impression: left ventricular hypertrophy    Josh Gross MD      RADIOLOGY:  XR CHEST PORTABLE (Results Pending)       LABS:   Results for orders placed or performed during the hospital encounter of 11/12/21   Blood Gas, Venous   Result Value Ref Range    pH, Brandon 7.094 (LL) 7.350 - 7.450    pCO2, Brandon 67.8 (H) 41.0 - 51.0 mmHg    pO2, Brandon 52.2 (H) 25.0 - 40.0 mmHg    HCO3, Venous 20.8 (L) 24.0 - 28.0 mmol/L    Base Excess, Brandon -9.9 (L) -2.0 - 3.0 mmol/L    O2 Sat, Brandon 71 Not established %    Carboxyhemoglobin 2.9 (H) 0.0 - 1.5 %    MetHgb, Brandon 0.6 0.0 - 1.5 %    TC02 (Calc), Brandon 23 mmol/L    Hemoglobin, Brandon, Reduced 28.40 %   CBC Auto Differential   Result Value Ref Range    WBC 12.3 (H) 4.0 - 11.0 K/uL    RBC 4.29 4. 20 - 5.90 M/uL    Hemoglobin 12.4 (L) 13.5 - 17.5 g/dL    Hematocrit 39.5 (L) 40.5 - 52.5 %    MCV 92.2 80.0 - 100.0 fL    MCH 29.0 26.0 - 34.0 pg    MCHC 31.4 31.0 - 36.0 g/dL    RDW 19.3 (H) 12.4 - 15.4 %    Platelets 677 (H) 132 - 450 K/uL    MPV 8.3 5.0 - 10.5 fL    Neutrophils % 63.9 %    Lymphocytes % 23.6 %    Monocytes % 7.6 %    Eosinophils % 4.7 %    Basophils % 0.2 %    Neutrophils Absolute 7.9 (H) 1.7 - 7.7 K/uL    Lymphocytes Absolute 2.9 1.0 - 5.1 K/uL    Monocytes Absolute 0.9 0.0 - 1.3 K/uL    Eosinophils Absolute 0.6 0.0 - 0.6 K/uL    Basophils Absolute 0.0 0.0 - 0.2 K/uL   Comprehensive Metabolic Panel w/ Reflex to MG   Result Value Ref Range    Sodium 138 136 - 145 mmol/L    Potassium reflex Magnesium 6.7 (HH) 3.5 - 5.1 mmol/L    Chloride 99 99 - 110 mmol/L    CO2 17 (L) 21 - 32 mmol/L    Anion Gap 22 (H) 3 - 16    Glucose 248 (H) 70 - 99 mg/dL    BUN 68 (H) 7 - 20 mg/dL    CREATININE 9.8 (HH) 0.9 - 1.3 mg/dL    GFR Non- 5 (A) >60    GFR  7 (A) >60    Calcium 8.3 8.3 - 10.6 mg/dL    Total Protein 8.5 (H) 6.4 - 8.2 g/dL    Albumin 4.4 3.4 - 5.0 g/dL    Albumin/Globulin Ratio 1.1 1.1 - 2.2    Total Bilirubin 0.3 0.0 - 1.0 mg/dL    Alkaline Phosphatase 83 40 - 129 U/L    ALT 12 10 - 40 U/L    AST 15 15 - 37 U/L   RECENT VITALS:  BP: (!) 159/91,Temp: 97.1 °F (36.2 °C), Pulse: 77, Resp: 17, SpO2: 100 %       ED Course     Nursing Notes, Past Medical Hx, Past Surgical Hx, Social Hx,Allergies, and Family Hx were reviewed. patient was given the following medications:  Orders Placed This Encounter   Medications    nitroglycerin (NITRO-BID) 2 % ointment     Tyler Gutierrez: cabinet override    nitroGLYCERIN 200-5 MCG/ML-% infusion     Tyler Gutierrez: cabinet override    nitroglycerin (NITRO-BID) 2 % ointment 0.5 inch    nitroGLYCERIN (NITROSTAT) SL tablet 0.4 mg    nitroGLYCERIN 50 mg in dextrose 5% 250 mL infusion    calcium gluconate 10 % injection 1,000 mg       CONSULTS:  IP CONSULT TO NEPHROLOGY  IP CONSULT TO HOSPITALIST  IP CONSULT TO CRITICAL CARE    MEDICAL DECISIONMAKING / ASSESSMENT / Pilar Mandy is a 63 y.o. male who presented in respiratory distress. Despite being on CPAP patient was noted to have oxygen saturations in the 60s. He was immediately switched to BiPAP and was given nitroglycerin for presumed pulmonary edema as he was noted to have crackles throughout all lung fields and significant elevation of his blood pressure. Patient's oxygen saturations did come up to the high 90s with interventions. Chest x-ray did reveal diffuse pulmonary edema. He was found to be hyperkalemic on laboratory exam.  Patient has no EKG changes to suggest cardiac instability at this time. I did call and speak with the nephrologist to arrange emergent dialysis. Do the fact that the patient remains on BiPAP with nitroglycerin drip that is being titrated upwards, patient will go to the ICU. Critical Care:  Due to the immediate potential for life-threatening deterioration due to respiratory failure, I spent 45 minutes providing critical care.   This time excludes time spent performing procedures but includes time spent on direct patient care, history retrieval, review of the chart, and discussions with patient, family, and consultant(s). Clinical Impression     1. Hyperkalemia    2. Other hypervolemia    3. ESRD (end stage renal disease) on dialysis [facility])        Disposition     PATIENT REFERRED TO:  No follow-up provider specified.     DISCHARGE MEDICATIONS:  New Prescriptions    No medications on file       DISPOSITION Admitted 11/12/2021 09:25:22 PM       Melanie Whiteside MD  11/12/21 0344 No

## 2022-05-28 ENCOUNTER — HOSPITAL ENCOUNTER (INPATIENT)
Age: 62
LOS: 1 days | Discharge: LEFT BEFORE DISCHARGE | DRG: 640 | End: 2022-05-29
Attending: STUDENT IN AN ORGANIZED HEALTH CARE EDUCATION/TRAINING PROGRAM | Admitting: INTERNAL MEDICINE
Payer: MEDICARE

## 2022-05-28 ENCOUNTER — APPOINTMENT (OUTPATIENT)
Dept: GENERAL RADIOLOGY | Age: 62
DRG: 640 | End: 2022-05-28
Payer: MEDICARE

## 2022-05-28 DIAGNOSIS — R19.7 DIARRHEA, UNSPECIFIED TYPE: ICD-10-CM

## 2022-05-28 DIAGNOSIS — R53.83 OTHER FATIGUE: ICD-10-CM

## 2022-05-28 DIAGNOSIS — E87.5 HYPERKALEMIA: Primary | ICD-10-CM

## 2022-05-28 LAB
ALBUMIN SERPL-MCNC: 4.2 G/DL (ref 3.4–5)
ALP BLD-CCNC: 74 U/L (ref 40–129)
ALT SERPL-CCNC: 7 U/L (ref 10–40)
ANION GAP SERPL CALCULATED.3IONS-SCNC: 24 MMOL/L (ref 3–16)
AST SERPL-CCNC: 25 U/L (ref 15–37)
BASE EXCESS VENOUS: 1.2 MMOL/L (ref -2–3)
BASOPHILS ABSOLUTE: 0.1 K/UL (ref 0–0.2)
BASOPHILS RELATIVE PERCENT: 1.2 %
BILIRUB SERPL-MCNC: 0.3 MG/DL (ref 0–1)
BILIRUBIN DIRECT: <0.2 MG/DL (ref 0–0.3)
BILIRUBIN, INDIRECT: ABNORMAL MG/DL (ref 0–1)
BUN BLDV-MCNC: 67 MG/DL (ref 7–20)
CALCIUM SERPL-MCNC: 9 MG/DL (ref 8.3–10.6)
CARBOXYHEMOGLOBIN: 4.5 % (ref 0–1.5)
CHLORIDE BLD-SCNC: 90 MMOL/L (ref 99–110)
CO2: 22 MMOL/L (ref 21–32)
CREAT SERPL-MCNC: 11.9 MG/DL (ref 0.9–1.3)
EOSINOPHILS ABSOLUTE: 0.2 K/UL (ref 0–0.6)
EOSINOPHILS RELATIVE PERCENT: 2.1 %
GFR AFRICAN AMERICAN: 5
GFR NON-AFRICAN AMERICAN: 4
GLUCOSE BLD-MCNC: 119 MG/DL (ref 70–99)
HCO3 VENOUS: 27.8 MMOL/L (ref 24–28)
HCT VFR BLD CALC: 38.5 % (ref 40.5–52.5)
HEMOGLOBIN, VEN, REDUCED: 67.2 %
HEMOGLOBIN: 12.6 G/DL (ref 13.5–17.5)
INFLUENZA A: NOT DETECTED
INFLUENZA B: NOT DETECTED
INR BLD: 1.03 (ref 0.87–1.14)
LACTIC ACID: 1.1 MMOL/L (ref 0.4–2)
LIPASE: 42 U/L (ref 13–60)
LYMPHOCYTES ABSOLUTE: 0.5 K/UL (ref 1–5.1)
LYMPHOCYTES RELATIVE PERCENT: 5.1 %
MCH RBC QN AUTO: 30.8 PG (ref 26–34)
MCHC RBC AUTO-ENTMCNC: 32.8 G/DL (ref 31–36)
MCV RBC AUTO: 93.8 FL (ref 80–100)
METHEMOGLOBIN VENOUS: 0.7 % (ref 0–1.5)
MONOCYTES ABSOLUTE: 0.6 K/UL (ref 0–1.3)
MONOCYTES RELATIVE PERCENT: 6.1 %
NEUTROPHILS ABSOLUTE: 8.8 K/UL (ref 1.7–7.7)
NEUTROPHILS RELATIVE PERCENT: 85.5 %
O2 SAT, VEN: 29 %
PCO2, VEN: 49.4 MMHG (ref 41–51)
PDW BLD-RTO: 17.7 % (ref 12.4–15.4)
PH VENOUS: 7.36 (ref 7.35–7.45)
PLATELET # BLD: 401 K/UL (ref 135–450)
PMV BLD AUTO: 8.7 FL (ref 5–10.5)
PO2, VEN: <30 MMHG (ref 25–40)
POTASSIUM REFLEX MAGNESIUM: 7.2 MMOL/L (ref 3.5–5.1)
POTASSIUM SERPL-SCNC: 5.4 MMOL/L (ref 3.5–5.1)
PRO-BNP: ABNORMAL PG/ML (ref 0–124)
PROTHROMBIN TIME: 13.5 SEC (ref 11.7–14.5)
RBC # BLD: 4.1 M/UL (ref 4.2–5.9)
SARS-COV-2 RNA, RT PCR: NOT DETECTED
SODIUM BLD-SCNC: 136 MMOL/L (ref 136–145)
TCO2 CALC VENOUS: 29 MMOL/L
TOTAL PROTEIN: 7.8 G/DL (ref 6.4–8.2)
TROPONIN: 0.12 NG/ML
WBC # BLD: 10.2 K/UL (ref 4–11)

## 2022-05-28 PROCEDURE — 87636 SARSCOV2 & INF A&B AMP PRB: CPT

## 2022-05-28 PROCEDURE — 99285 EMERGENCY DEPT VISIT HI MDM: CPT

## 2022-05-28 PROCEDURE — 71045 X-RAY EXAM CHEST 1 VIEW: CPT

## 2022-05-28 PROCEDURE — 82803 BLOOD GASES ANY COMBINATION: CPT

## 2022-05-28 PROCEDURE — 6360000002 HC RX W HCPCS: Performed by: STUDENT IN AN ORGANIZED HEALTH CARE EDUCATION/TRAINING PROGRAM

## 2022-05-28 PROCEDURE — 96365 THER/PROPH/DIAG IV INF INIT: CPT

## 2022-05-28 PROCEDURE — 2580000003 HC RX 258: Performed by: STUDENT IN AN ORGANIZED HEALTH CARE EDUCATION/TRAINING PROGRAM

## 2022-05-28 PROCEDURE — 93005 ELECTROCARDIOGRAM TRACING: CPT | Performed by: STUDENT IN AN ORGANIZED HEALTH CARE EDUCATION/TRAINING PROGRAM

## 2022-05-28 PROCEDURE — 80048 BASIC METABOLIC PNL TOTAL CA: CPT

## 2022-05-28 PROCEDURE — 6370000000 HC RX 637 (ALT 250 FOR IP): Performed by: STUDENT IN AN ORGANIZED HEALTH CARE EDUCATION/TRAINING PROGRAM

## 2022-05-28 PROCEDURE — 2500000003 HC RX 250 WO HCPCS: Performed by: STUDENT IN AN ORGANIZED HEALTH CARE EDUCATION/TRAINING PROGRAM

## 2022-05-28 PROCEDURE — 84484 ASSAY OF TROPONIN QUANT: CPT

## 2022-05-28 PROCEDURE — 83690 ASSAY OF LIPASE: CPT

## 2022-05-28 PROCEDURE — 85025 COMPLETE CBC W/AUTO DIFF WBC: CPT

## 2022-05-28 PROCEDURE — 85610 PROTHROMBIN TIME: CPT

## 2022-05-28 PROCEDURE — 84132 ASSAY OF SERUM POTASSIUM: CPT

## 2022-05-28 PROCEDURE — 83880 ASSAY OF NATRIURETIC PEPTIDE: CPT

## 2022-05-28 PROCEDURE — 83605 ASSAY OF LACTIC ACID: CPT

## 2022-05-28 PROCEDURE — 96375 TX/PRO/DX INJ NEW DRUG ADDON: CPT

## 2022-05-28 PROCEDURE — 80076 HEPATIC FUNCTION PANEL: CPT

## 2022-05-28 PROCEDURE — 36415 COLL VENOUS BLD VENIPUNCTURE: CPT

## 2022-05-28 RX ORDER — SODIUM POLYSTYRENE SULFONATE 15 G/60ML
15 SUSPENSION ORAL; RECTAL ONCE
Status: DISCONTINUED | OUTPATIENT
Start: 2022-05-28 | End: 2022-05-28

## 2022-05-28 RX ORDER — DEXTROSE MONOHYDRATE 50 MG/ML
100 INJECTION, SOLUTION INTRAVENOUS PRN
Status: DISCONTINUED | OUTPATIENT
Start: 2022-05-28 | End: 2022-05-29 | Stop reason: HOSPADM

## 2022-05-28 RX ORDER — CARVEDILOL 25 MG/1
25 TABLET ORAL 2 TIMES DAILY WITH MEALS
Status: DISCONTINUED | OUTPATIENT
Start: 2022-05-29 | End: 2022-05-29 | Stop reason: HOSPADM

## 2022-05-28 RX ORDER — CALCIUM GLUCONATE 94 MG/ML
1000 INJECTION, SOLUTION INTRAVENOUS ONCE
Status: COMPLETED | OUTPATIENT
Start: 2022-05-28 | End: 2022-05-28

## 2022-05-28 RX ADMIN — DEXTROSE MONOHYDRATE 250 ML: 100 INJECTION, SOLUTION INTRAVENOUS at 21:12

## 2022-05-28 RX ADMIN — SODIUM BICARBONATE 50 MEQ: 84 INJECTION, SOLUTION INTRAVENOUS at 21:33

## 2022-05-28 RX ADMIN — CALCIUM GLUCONATE 1000 MG: 98 INJECTION, SOLUTION INTRAVENOUS at 21:06

## 2022-05-28 RX ADMIN — INSULIN HUMAN 10 UNITS: 100 INJECTION, SOLUTION PARENTERAL at 21:06

## 2022-05-28 NOTE — ED PROVIDER NOTES
[address] [facility] RESIDENT NOTE       Date of evaluation: 5/28/2022    Chief Complaint     Diarrhea and Fatigue      History of Present Illness     Maia Frausto is a 63 y.o. male with a PMHx of of ESRD on Tuesday/Thursday/Saturday dialysis with last dialysis session on Tuesday, hypertension, type 2 diabetes, PAD on warfarin, recent heart valve replacement (TAVR 5/13/2022 at [facility]) who presents to the emergency department today with 4 days of general malaise, fatigue, and fevers at home with diffuse, watery diarrhea. Patient states that he began to have diarrhea starting earlier this week just after his Tuesday dialysis session. Patient states that he has had profuse diarrhea with 50 episodes since the start. He also endorses off-and-on fevers throughout the last couple of days. He has tried Imodium as well as Pepto-Bismol without alleviation of his symptoms. He denies associated abdominal pain. He last ate 2 days ago, at which time he had 1 episode of nonbloody, nonbilious emesis. He has had persistent nausea and has been taking in very small amounts of fluids due to his concern that he has not been dialyzed over his last 2 sessions and thus is at risk of increased volume overload. Patient is now reporting shortness of breath which is ultimately why he came into the emergency department for evaluation. He denies any peripheral edema at this time. He denies any chest pain or chest tightness. He denies any abdominal pain associated with his symptoms. No constipation. No additional symptoms at this time. Other than that mentioned above, there are no other alleviating or provoking factors associated with the patient's presentation today.     Review of Systems     Review of Systems    Review of systems is positive for diarrhea, fatigue, malaise, fever  Review of systems is negative for chest pain, chest tightness, abdominal pain, hematemesis, melena, hematochezia  Further review of systems is negative other than that mentioned in the HPI. Past Medical, Surgical, Family, and Social History     He has a past medical history of Chronic kidney disease and Hypertension. He has no past surgical history on file. His family history is not on file. He reports that he has been smoking. He has been smoking about 0.50 packs per day. He has never used smokeless tobacco. He reports previous alcohol use. He reports current drug use. Drug: Marijuana [place] [place]). Medications     Previous Medications    ASPIRIN 81 MG EC TABLET    Take 81 mg by mouth daily    ATORVASTATIN (LIPITOR) 40 MG TABLET    Take 40 mg by mouth daily     CARVEDILOL (COREG) 25 MG TABLET    Take 25 mg by mouth 2 times daily (with meals)     DULOXETINE (CYMBALTA) 60 MG EXTENDED RELEASE CAPSULE    Take 60 mg by mouth daily    GABAPENTIN (NEURONTIN) 100 MG CAPSULE    Take 100 mg by mouth nightly. INSULIN GLARGINE (LANTUS) 100 UNIT/ML INJECTION VIAL    Inject 5 Units into the skin nightly     INSULIN LISPRO (HUMALOG) 100 UNIT/ML INJECTION VIAL    Inject into the skin 3 times daily (before meals) Sliding scale    NIFEDIPINE (PROCARDIA XL) 60 MG EXTENDED RELEASE TABLET    Take 60 mg by mouth 2 times daily    SEVELAMER (RENVELA) 800 MG TABLET    Take 2 tablets by mouth 3 times daily (with meals)    SITAGLIPTIN (JANUVIA) 25 MG TABLET    Take 25 mg by mouth daily    TRAZODONE (DESYREL) 50 MG TABLET    Take 50 mg by mouth nightly    VITAMIN D (CHOLECALCIFEROL) 25 MCG (1000 UT) TABS TABLET    Take 1,000 Units by mouth daily    WARFARIN (COUMADIN) 5 MG TABLET    Take 2.5-5 mg by mouth daily 2.5mg on Mondays, 5mg all other days       Allergies     He is allergic to naproxen. Physical Exam     INITIAL VITALS: BP: (!) 196/85, Temp: 98.6 °F (37 °C), Heart Rate: 89, Resp: 18, SpO2: 92 %     Gen: NAD, in bed comfortably, non-diaphoretic   Head/Eyes: Atraumatic. PERRL. EOMI bilaterally.  No conjunctival injection or scleral icterus   ENT: Neck supple, trachea midline, no LAD. MMM, no posterior oropharyngeal erythema or exudate. External auditory meatus clear without discharge or erythema. No nasal congestion or discharge. Cardiovascular: RRR no murmurs, rubs, or gallops. Pulmonary:Lungs CTAB, no rales, wheezes, or ronchi   Abdominal: Soft, non-tender. No rebound or guarding. Non-peritoneal   MSK: no obvious long bone deformity. Skin:  Warm, dry. No rashes, ecchymosis or cyanosis. Neuro: Alert and oriented x 4. Cranial nerves grossly intact. Moving all extremities equally. Gait narrow and steady. Extremities:  No peripheral edema. Psych: Euthymic affect. Normal rate and rhythm of speech with full prosody. Linear thought process. DiagnosticResults     EKG   Interpreted in conjunction with emergencydepartment physician No att. providers found    See ED course     RADIOLOGY:  XR CHEST PORTABLE   Final Result      Stable cardiomegaly and mild interstitial edema.              LABS:   Results for orders placed or performed during the hospital encounter of 05/28/22   COVID-19 & Influenza Combo    Specimen: Nasopharyngeal Swab   Result Value Ref Range    SARS-CoV-2 RNA, RT PCR NOT DETECTED NOT DETECTED    INFLUENZA A NOT DETECTED NOT DETECTED    INFLUENZA B NOT DETECTED NOT DETECTED   CBC with Auto Differential   Result Value Ref Range    WBC 10.2 4.0 - 11.0 K/uL    RBC 4.10 (L) 4.20 - 5.90 M/uL    Hemoglobin 12.6 (L) 13.5 - 17.5 g/dL    Hematocrit 38.5 (L) 40.5 - 52.5 %    MCV 93.8 80.0 - 100.0 fL    MCH 30.8 26.0 - 34.0 pg    MCHC 32.8 31.0 - 36.0 g/dL    RDW 17.7 (H) 12.4 - 15.4 %    Platelets 594 348 - 962 K/uL    MPV 8.7 5.0 - 10.5 fL    Neutrophils % 85.5 %    Lymphocytes % 5.1 %    Monocytes % 6.1 %    Eosinophils % 2.1 %    Basophils % 1.2 %    Neutrophils Absolute 8.8 (H) 1.7 - 7.7 K/uL    Lymphocytes Absolute 0.5 (L) 1.0 - 5.1 K/uL    Monocytes Absolute 0.6 0.0 - 1.3 K/uL    Eosinophils Absolute 0.2 0.0 - 0.6 K/uL    Basophils Absolute 0.1 0.0 - 0.2 K/uL   Basic Metabolic Panel w/ Reflex to MG   Result Value Ref Range    Sodium 136 136 - 145 mmol/L    Potassium reflex Magnesium 7.2 (HH) 3.5 - 5.1 mmol/L    Chloride 90 (L) 99 - 110 mmol/L    CO2 22 21 - 32 mmol/L    Anion Gap 24 (H) 3 - 16    Glucose 119 (H) 70 - 99 mg/dL    BUN 67 (H) 7 - 20 mg/dL    CREATININE 11.9 (HH) 0.9 - 1.3 mg/dL    GFR Non-African American 4 (A) >60    GFR  5 (A) >60    Calcium 9.0 8.3 - 10.6 mg/dL   Hepatic Function Panel   Result Value Ref Range    Total Protein 7.8 6.4 - 8.2 g/dL    Albumin 4.2 3.4 - 5.0 g/dL    Alkaline Phosphatase 74 40 - 129 U/L    ALT 7 (L) 10 - 40 U/L    AST 25 15 - 37 U/L    Total Bilirubin 0.3 0.0 - 1.0 mg/dL    Bilirubin, Direct <0.2 0.0 - 0.3 mg/dL    Bilirubin, Indirect see below 0.0 - 1.0 mg/dL   Lipase   Result Value Ref Range    Lipase 42.0 13.0 - 60.0 U/L   Troponin   Result Value Ref Range    Troponin 0.12 (H) <0.01 ng/mL   Brain Natriuretic Peptide   Result Value Ref Range    Pro-BNP >70,000 (H) 0 - 124 pg/mL   Protime-INR   Result Value Ref Range    Protime 13.5 11.7 - 14.5 sec    INR 1.03 0.87 - 1.14   Lactic Acid   Result Value Ref Range    Lactic Acid 1.1 0.4 - 2.0 mmol/L   Blood Gas, Venous   Result Value Ref Range    pH, Brandon 7.359 7.350 - 7.450    pCO2, Brandon 49.4 41.0 - 51.0 mmHg    pO2, Brandon <30.0 25.0 - 40.0 mmHg    HCO3, Venous 27.8 24.0 - 28.0 mmol/L    Base Excess, Brandon 1.2 -2.0 - 3.0 mmol/L    O2 Sat, Brandon 29 Not established %    Carboxyhemoglobin 4.5 (H) 0.0 - 1.5 %    MetHgb, Brandon 0.7 0.0 - 1.5 %    TC02 (Calc), Brandon 29 mmol/L    Hemoglobin, Brandon, Reduced 67.20 %   Potassium   Result Value Ref Range    Potassium 5.4 (H) 3.5 - 5.1 mmol/L       ED BEDSIDE ULTRASOUND:  None    RECENT VITALS:  BP: (!) 190/98, Temp: 98.6 °F (37 °C), Heart Rate: 86,Resp: 18, SpO2: 92 %     Procedures     None    ED Course     Nursing Notes, Past Medical Hx, Past Surgical Hx, Social Hx, Allergies, and Family Hx were reviewed. The patient was given the followingmedications:  Orders Placed This Encounter   Medications    calcium gluconate 10 % injection 1,000 mg    AND Linked Order Group     insulin regular (HUMULIN R;NOVOLIN R) injection 10 Units     dextrose bolus 10% 250 mL    glucose chewable tablet 16 g    OR Linked Order Group     dextrose bolus 10% 125 mL     dextrose bolus 10% 250 mL    glucagon (rDNA) injection 1 mg    dextrose 5 % solution    sodium bicarbonate 8.4 % injection 50 mEq    DISCONTD: sodium polystyrene (KAYEXALATE) 15 GM/60ML suspension 15 g    sodium zirconium cyclosilicate (LOKELMA) oral suspension 10 g    carvedilol (COREG) tablet 25 mg       CONSULTS:  IP CONSULT TO NEPHROLOGY  IP CONSULT TO NEPHROLOGY  IP CONSULT TO HOSPITALIST  IP CONSULT TO PHARMACY    ED Course as of 05/29/22 0039   Sat May 28, 2022   1942 EKG obtained here in the emergency department which reveals a normal sinus rhythm with IN prolongation consistent with first-degree AV block. QRS is narrow. There is a slight prolongation in his QT at 500 ms. Axis is normal.  There is no significant ST elevation or depression. Patient is noted to have T wave inversions in leads I as well as aVL. T wave flattening in V5 and V6. No STEMI. [JF]   1943 EKG is unchanged from prior EKG 11/13/2021. [JF]   2000 Patient states he is not taking his blood thinners any more. [JF]   2002 Per recent cardiology note at [facility] on 5/13, the patient is to be taking aspirin and Plavix which the patient has been taking.   There is no mention of whether or not the patient should be on Coumadin [JF]   2016 CBC with Auto Differential(!):    WBC 10.2   RBC 4.10(!)   Hemoglobin Quant 12.6(!)   Hematocrit 38.5(!)   MCV 93.8   MCH 30.8   MCHC 32.8   RDW 17.7(!)   Platelet Count 631   MPV 8.7   Neutrophils % 85.5   Lymphocyte % 5.1   Monocytes % 6.1   Eosinophils % 2.1   Basophils % 1.2   Neutrophils Absolute 8.8(!)   Lymphocytes Absolute 0.5(!)   Monocytes Absolute 0.6   Eosinophils Absolute 0.2   Basophils Absolute 0.1  Baseline anemia improved compared to previous. [JF]   2030 Blood Gas, Venous(!):    pH, Brandon 7.359   pCO2, Brandon 49.4   HCO3, Venous 27.8   Base Excess, Brandon 1.2   O2 Sat, Brandon 29   Carboxyhemoglobin 4.5(!)   MetHgb, Brandon 0.7   TC02 (Calc), Brandon 29   Hemoglobin, Brandon, Reduced 67.20  VBG without evidence of acid-base disturbance. Lactate within normal limits. [JF]   2038 Troponin(!): 0.12  Will repeat, however this is lower than his baseline. [JF]   8756 Basic Metabolic Panel w/ Reflex to MG(!!):    Sodium 136   Potassium 7.2(!!)   Chloride 90(!)   CO2 22   Anion Gap 24(!)   GLUCOSE, FASTING,(!)   BUN,BUNPL 67(!)   Creatinine 11.9(!!)   GFR Non- 4(!)   GFR  5(!)   CALCIUM, SERUM, 864059 9.0  Basic metabolic panel consistent with patient in need of dialysis with an elevated BUN. Potassium is hemolyzed and thus I will repeat his potassium at this time. Creatinine significantly elevated. His LFTs are unremarkable. Lipase within normal limits. [JF]   2042 EKG 12 Lead  T waves appear peaked. Will treat for hyperk [JF]   2044 Patient with potassium of 7.2 though this is hemolyzed. However, when comparing his EKG to prior, the patient does appear to have peaked T waves/EKG changes consistent with hyperkalemia. As result, I will treat empirically and repeat his potassium in the process. Patient has been given insulin as well as sodium bicarb and calcium gluconate. I have also given the patient Kayexalate. [JF]   2122 XR CHEST PORTABLE  IMPRESSION:     Stable cardiomegaly and mild interstitial edema. [JF]   2133 Spoke with nephrology will follow up the repeat potassium and then reach back out to me to discuss possible dialysis.  [JF]   2155 Pro-BNP(!): >70,000 [JF]   [facility]   [JF]   2235 COVID-19 & Influenza Combo:    SARS-CoV-2 RNA, RT PCR NOT DETECTED   INFLUENZA A NOT DETECTED INFLUENZA B NOT DETECTED [JF]   2252 Potassium(!): 5.4 [JF]   2351 Nephrology commends admission to the hospital with dialysis tomorrow. Patient does not require emergent dialysis tonight. [JF]      ED Course User Index  [JF] Aliza Holcomb MD       MEDICAL DECISION MAKING / ASSESSMENT / PLAN     Teresa Agarwal is a 63 y.o. Loretta Gathers a history of present illness, physical examination, past medical history as noted above who presents to the emergency department today with fatigue, malaise, fever, diarrhea with last dialysis session Tuesday. 81-year-old male presenting as noted above. Patient overall well appearing though noted to be hypertensive. O2 saturation 92% with clear lungs on physical examination. Low suspicion for flash pulmonary edema. Patient presenting in setting of diarrhea as well as has missed dialysis with last session Tuesday this week. Broad medical work-up was initiated which reveals a CBC without leukocytosis. Patient has baseline anemia that appears to be stable/improved today. His basic metabolic panel was notable for hypochloremia in addition to concern for significant hyperkalemia though this was hemolyzed. Further, patient had an elevated anion gap as well as an elevated BUN with significant elevation in his creatinine, consistent with patient in need of dialysis. EKG was obtained which revealed peaked T waves compared to prior EKG and, despite hemolysis, elected to treat the patient with insulin, calcium, bicarb, and [place] [place] for suspected hyperkalemia with EKG changes. Nephrology was consulted who recommended repeat potassium. Repeat potassium was 5.4 and thus nephrology did not feel patient required emergent dialysis tonight but rather recommended admission to medicine for dialysis in the morning. VBG without evidence of acidosis or acid-base disturbance. LFTs unremarkable. Chest x-ray was obtained without evidence of focal pneumonia, pneumothorax or pleural effusions. However, there was evidence of pulmonary edema. COVID-19 as well as flu negative. Given the above, I do believe patient requires admission for further evaluation management, specifically dialysis, management of underlying diarrhea, as well as hyperkalemia. This was discussed with the admitting hospital medicine provider who is excepted the patient for admission. All questions answered to their satisfaction. Patient is amenable to admission on my conversation with him. This patient was also evaluated by the attending physician. All care plans werediscussed and agreed upon. Clinical Impression     1. Hyperkalemia    2. Diarrhea, unspecified type    3. Other fatigue        Disposition     PATIENT REFERRED TO:  No follow-up provider specified. DISCHARGE MEDICATIONS:  New Prescriptions    No medications on file       DISPOSITION Admitted 05/29/2022 12:25:19 AM     At this time, the patient was admitted to medicine for further evaluation and management of hyperkalemia, dialysis, diarrhea. The patient has been signed out to the admitting provider and all questions by the admitting team have been answered. The patient will continue to be monitored in the emergency department until they are moved to their next treatment location.         Lelo Vaca MD  05/29/22 8516

## 2022-05-29 VITALS
WEIGHT: 159.17 LBS | BODY MASS INDEX: 24.98 KG/M2 | OXYGEN SATURATION: 97 % | DIASTOLIC BLOOD PRESSURE: 65 MMHG | HEIGHT: 67 IN | HEART RATE: 89 BPM | TEMPERATURE: 98 F | SYSTOLIC BLOOD PRESSURE: 126 MMHG | RESPIRATION RATE: 18 BRPM

## 2022-05-29 PROBLEM — E11.9 TYPE 2 DIABETES MELLITUS, WITH LONG-TERM CURRENT USE OF INSULIN (HCC): Status: ACTIVE | Noted: 2022-05-29

## 2022-05-29 PROBLEM — R19.7 DIARRHEA: Status: ACTIVE | Noted: 2022-05-29

## 2022-05-29 PROBLEM — Z99.2 ENCOUNTER FOR HEMODIALYSIS (HCC): Status: ACTIVE | Noted: 2022-05-29

## 2022-05-29 PROBLEM — Z79.4 TYPE 2 DIABETES MELLITUS, WITH LONG-TERM CURRENT USE OF INSULIN (HCC): Status: ACTIVE | Noted: 2022-01-01

## 2022-05-29 PROBLEM — Z95.2 S/P TAVR (TRANSCATHETER AORTIC VALVE REPLACEMENT): Status: ACTIVE | Noted: 2022-05-29

## 2022-05-29 LAB
ANION GAP SERPL CALCULATED.3IONS-SCNC: 31 MMOL/L (ref 3–16)
BUN BLDV-MCNC: 72 MG/DL (ref 7–20)
CALCIUM SERPL-MCNC: 8.9 MG/DL (ref 8.3–10.6)
CHLORIDE BLD-SCNC: 92 MMOL/L (ref 99–110)
CO2: 17 MMOL/L (ref 21–32)
CREAT SERPL-MCNC: 12.9 MG/DL (ref 0.9–1.3)
EKG ATRIAL RATE: 81 BPM
EKG ATRIAL RATE: 86 BPM
EKG DIAGNOSIS: NORMAL
EKG DIAGNOSIS: NORMAL
EKG P AXIS: 54 DEGREES
EKG P AXIS: 59 DEGREES
EKG P-R INTERVAL: 246 MS
EKG P-R INTERVAL: 250 MS
EKG Q-T INTERVAL: 418 MS
EKG Q-T INTERVAL: 424 MS
EKG QRS DURATION: 94 MS
EKG QRS DURATION: 98 MS
EKG QTC CALCULATION (BAZETT): 492 MS
EKG QTC CALCULATION (BAZETT): 500 MS
EKG R AXIS: 39 DEGREES
EKG R AXIS: 44 DEGREES
EKG T AXIS: 127 DEGREES
EKG T AXIS: 146 DEGREES
EKG VENTRICULAR RATE: 81 BPM
EKG VENTRICULAR RATE: 86 BPM
GFR AFRICAN AMERICAN: 5
GFR NON-AFRICAN AMERICAN: 4
GLUCOSE BLD-MCNC: 113 MG/DL (ref 70–99)
GLUCOSE BLD-MCNC: 95 MG/DL (ref 70–99)
PERFORMED ON: ABNORMAL
POTASSIUM SERPL-SCNC: 6.5 MMOL/L (ref 3.5–5.1)
SODIUM BLD-SCNC: 140 MMOL/L (ref 136–145)
TROPONIN: 0.13 NG/ML
TROPONIN: 0.13 NG/ML

## 2022-05-29 PROCEDURE — 83036 HEMOGLOBIN GLYCOSYLATED A1C: CPT

## 2022-05-29 PROCEDURE — 36415 COLL VENOUS BLD VENIPUNCTURE: CPT

## 2022-05-29 PROCEDURE — 84484 ASSAY OF TROPONIN QUANT: CPT

## 2022-05-29 PROCEDURE — G0378 HOSPITAL OBSERVATION PER HR: HCPCS

## 2022-05-29 PROCEDURE — 99223 1ST HOSP IP/OBS HIGH 75: CPT | Performed by: HOSPITALIST

## 2022-05-29 PROCEDURE — 6370000000 HC RX 637 (ALT 250 FOR IP): Performed by: INTERNAL MEDICINE

## 2022-05-29 PROCEDURE — 80048 BASIC METABOLIC PNL TOTAL CA: CPT

## 2022-05-29 PROCEDURE — 1200000000 HC SEMI PRIVATE

## 2022-05-29 RX ORDER — SODIUM CHLORIDE 0.9 % (FLUSH) 0.9 %
5-40 SYRINGE (ML) INJECTION EVERY 12 HOURS SCHEDULED
Status: DISCONTINUED | OUTPATIENT
Start: 2022-05-29 | End: 2022-05-29 | Stop reason: HOSPADM

## 2022-05-29 RX ORDER — ONDANSETRON 4 MG/1
4 TABLET, ORALLY DISINTEGRATING ORAL EVERY 8 HOURS PRN
Status: DISCONTINUED | OUTPATIENT
Start: 2022-05-29 | End: 2022-05-29 | Stop reason: HOSPADM

## 2022-05-29 RX ORDER — NIFEDIPINE 60 MG/1
60 TABLET, EXTENDED RELEASE ORAL 2 TIMES DAILY
Status: DISCONTINUED | OUTPATIENT
Start: 2022-05-29 | End: 2022-05-29 | Stop reason: HOSPADM

## 2022-05-29 RX ORDER — POLYETHYLENE GLYCOL 3350 17 G/17G
17 POWDER, FOR SOLUTION ORAL DAILY PRN
Status: DISCONTINUED | OUTPATIENT
Start: 2022-05-29 | End: 2022-05-29 | Stop reason: HOSPADM

## 2022-05-29 RX ORDER — ATORVASTATIN CALCIUM 40 MG/1
40 TABLET, FILM COATED ORAL DAILY
Status: DISCONTINUED | OUTPATIENT
Start: 2022-05-29 | End: 2022-05-29 | Stop reason: HOSPADM

## 2022-05-29 RX ORDER — TRAZODONE HYDROCHLORIDE 50 MG/1
50 TABLET ORAL NIGHTLY
Status: DISCONTINUED | OUTPATIENT
Start: 2022-05-29 | End: 2022-05-29 | Stop reason: HOSPADM

## 2022-05-29 RX ORDER — DEXTROSE MONOHYDRATE 50 MG/ML
100 INJECTION, SOLUTION INTRAVENOUS PRN
Status: DISCONTINUED | OUTPATIENT
Start: 2022-05-29 | End: 2022-05-29 | Stop reason: SDUPTHER

## 2022-05-29 RX ORDER — ONDANSETRON 2 MG/ML
4 INJECTION INTRAMUSCULAR; INTRAVENOUS EVERY 6 HOURS PRN
Status: DISCONTINUED | OUTPATIENT
Start: 2022-05-29 | End: 2022-05-29 | Stop reason: HOSPADM

## 2022-05-29 RX ORDER — ACETAMINOPHEN 650 MG/1
650 SUPPOSITORY RECTAL EVERY 6 HOURS PRN
Status: DISCONTINUED | OUTPATIENT
Start: 2022-05-29 | End: 2022-05-29 | Stop reason: HOSPADM

## 2022-05-29 RX ORDER — SEVELAMER CARBONATE 800 MG/1
1600 TABLET, FILM COATED ORAL
Status: DISCONTINUED | OUTPATIENT
Start: 2022-05-29 | End: 2022-05-29 | Stop reason: HOSPADM

## 2022-05-29 RX ORDER — DULOXETIN HYDROCHLORIDE 60 MG/1
60 CAPSULE, DELAYED RELEASE ORAL DAILY
Status: DISCONTINUED | OUTPATIENT
Start: 2022-05-29 | End: 2022-05-29 | Stop reason: HOSPADM

## 2022-05-29 RX ORDER — VITAMIN B COMPLEX
1000 TABLET ORAL DAILY
Status: DISCONTINUED | OUTPATIENT
Start: 2022-05-29 | End: 2022-05-29 | Stop reason: HOSPADM

## 2022-05-29 RX ORDER — INSULIN LISPRO 100 [IU]/ML
0-3 INJECTION, SOLUTION INTRAVENOUS; SUBCUTANEOUS NIGHTLY
Status: DISCONTINUED | OUTPATIENT
Start: 2022-05-29 | End: 2022-05-29 | Stop reason: HOSPADM

## 2022-05-29 RX ORDER — ACETAMINOPHEN 325 MG/1
650 TABLET ORAL EVERY 6 HOURS PRN
Status: DISCONTINUED | OUTPATIENT
Start: 2022-05-29 | End: 2022-05-29 | Stop reason: HOSPADM

## 2022-05-29 RX ORDER — GABAPENTIN 100 MG/1
100 CAPSULE ORAL NIGHTLY
Status: DISCONTINUED | OUTPATIENT
Start: 2022-05-29 | End: 2022-05-29 | Stop reason: HOSPADM

## 2022-05-29 RX ORDER — DEXTROSE MONOHYDRATE 25 G/50ML
25 INJECTION, SOLUTION INTRAVENOUS ONCE
Status: DISCONTINUED | OUTPATIENT
Start: 2022-05-29 | End: 2022-05-29 | Stop reason: SDUPTHER

## 2022-05-29 RX ORDER — HEPARIN SODIUM 5000 [USP'U]/ML
5000 INJECTION, SOLUTION INTRAVENOUS; SUBCUTANEOUS 3 TIMES DAILY
Status: DISCONTINUED | OUTPATIENT
Start: 2022-05-29 | End: 2022-05-29 | Stop reason: HOSPADM

## 2022-05-29 RX ORDER — ASPIRIN 81 MG/1
81 TABLET ORAL DAILY
Status: DISCONTINUED | OUTPATIENT
Start: 2022-05-29 | End: 2022-05-29 | Stop reason: HOSPADM

## 2022-05-29 RX ORDER — SODIUM CHLORIDE 0.9 % (FLUSH) 0.9 %
5-40 SYRINGE (ML) INJECTION PRN
Status: DISCONTINUED | OUTPATIENT
Start: 2022-05-29 | End: 2022-05-29 | Stop reason: HOSPADM

## 2022-05-29 RX ORDER — CALCIUM GLUCONATE 20 MG/ML
2000 INJECTION, SOLUTION INTRAVENOUS ONCE
Status: DISCONTINUED | OUTPATIENT
Start: 2022-05-29 | End: 2022-05-29 | Stop reason: HOSPADM

## 2022-05-29 RX ORDER — SODIUM CHLORIDE 9 MG/ML
INJECTION, SOLUTION INTRAVENOUS PRN
Status: DISCONTINUED | OUTPATIENT
Start: 2022-05-29 | End: 2022-05-29 | Stop reason: HOSPADM

## 2022-05-29 RX ORDER — INSULIN LISPRO 100 [IU]/ML
0-6 INJECTION, SOLUTION INTRAVENOUS; SUBCUTANEOUS
Status: DISCONTINUED | OUTPATIENT
Start: 2022-05-29 | End: 2022-05-29 | Stop reason: HOSPADM

## 2022-05-29 RX ADMIN — SEVELAMER CARBONATE 1600 MG: 800 TABLET, FILM COATED ORAL at 09:23

## 2022-05-29 RX ADMIN — NIFEDIPINE 60 MG: 60 TABLET, FILM COATED, EXTENDED RELEASE ORAL at 02:47

## 2022-05-29 NOTE — PROGRESS NOTES
Patient removed his own IV. I asked to call the [place] police to do a wellness check. But patient does not want that. Patient understands the risk of going home without receiving dialysis. He would not even stay for the calcium gluconate. He went down stairs at 1320. I will try and call him in an hour just to follow up.

## 2022-05-29 NOTE — PROGRESS NOTES
Hospital Medicine Care  Progress Note      Chief complain; Diarrhea and Fatigue            Subjective:     Diarrhea resolved, normal BM this am  No HD since last Tuesday  No cp, sob    Review of Systems:     Review of Systems as mentioned above, other ros is negative. Objective:       Medications        Scheduled Meds:   aspirin  81 mg Oral Daily    atorvastatin  40 mg Oral Daily    DULoxetine  60 mg Oral Daily    gabapentin  100 mg Oral Nightly    insulin glargine  5 Units SubCUTAneous Nightly    NIFEdipine  60 mg Oral BID    sevelamer  1,600 mg Oral TID WC    traZODone  50 mg Oral Nightly    Vitamin D  1,000 Units Oral Daily    sodium chloride flush  5-40 mL IntraVENous 2 times per day    heparin (porcine)  5,000 Units SubCUTAneous TID    insulin lispro  0-6 Units SubCUTAneous TID WC    insulin lispro  0-3 Units SubCUTAneous Nightly    calcium gluconate  2,000 mg IntraVENous Once    insulin regular  5 Units IntraVENous Once    dextrose bolus  250 mL IntraVENous Once    sodium zirconium cyclosilicate  10 g Oral Once    carvedilol  25 mg Oral BID WC     Continuous Infusions:   sodium chloride      dextrose       PRN Meds:.glucose, sodium chloride flush, sodium chloride, ondansetron **OR** ondansetron, polyethylene glycol, acetaminophen **OR** acetaminophen, dextrose bolus **OR** dextrose bolus, glucagon (rDNA), dextrose      Allergies  Allergies   Allergen Reactions    Naproxen      Itching of hands and feet         Vitals:    05/29/22 0100 05/29/22 0159 05/29/22 0459 05/29/22 0819   BP: (!) 191/92 (!) 187/87 (!) 189/88 126/65   Pulse: 88 89 87 89   Resp:  18 18 18   Temp:  98.4 °F (36.9 °C) 98.3 °F (36.8 °C) 98 °F (36.7 °C)   TempSrc:  Oral Oral Oral   SpO2: 96% 92% 94% 97%   Weight:  159 lb 2.8 oz (72.2 kg)     Height:             Physical exam:         Physical Exam  Constitutional:       Appearance: Normal appearance. HENT:      Head: Normocephalic and atraumatic.    Cardiovascular: Rate and Rhythm: Normal rate and regular rhythm. Pulses: Normal pulses. Heart sounds: Normal heart sounds. Pulmonary:      Effort: Pulmonary effort is normal.      Breath sounds: Normal breath sounds. Skin:     General: Skin is warm and dry. Capillary Refill: Capillary refill takes less than 2 seconds. Neurological:      General: No focal deficit present. Mental Status: He is alert and oriented to person, place, and time. Psychiatric:         Mood and Affect: Mood normal.         Behavior: Behavior normal.               Labs      Recent Labs     05/28/22 1953   WBC 10.2   HGB 12.6*   HCT 38.5*      MCV 93.8        Recent Labs     05/28/22 1953 05/28/22  2224 05/29/22  0605     --  140   K 7.2* 5.4* 6.5*   CL 90*  --  92*   CO2 22  --  17*   BUN 67*  --  72*   CREATININE 11.9*  --  12.9*       Recent Labs     05/28/22 1953   AST 25   ALT 7*   BILIDIR <0.2   BILITOT 0.3   ALKPHOS 74       No results for input(s): MG in the last 72 hours. Radiology  XR CHEST PORTABLE   Final Result      Stable cardiomegaly and mild interstitial edema. Assessment and Plan:   Principal Problem:    Hyperkalemia  Active Problems:    Encounter for hemodialysis ([address]    S/P TAVR (transcatheter aortic valve replacement)    Diarrhea    Type 2 diabetes mellitus, with long-term current use of insulin (HCC)    ESRD (end stage renal disease) ([address]  Resolved Problems:    * No resolved hospital problems. *     Hyperkalemia  HD today, discussed with nephrology    esrd  HD per nephrology    Diarrhea suspect viral infection  Improved    DM type 2  Well controlled    S/p TAVR   Stable. No sob    Continue other PTA meds unless contraindicated.           Chichi Garcia MD  5/29/2022

## 2022-05-29 NOTE — H&P
Hospital Medicine History & Physical      PCP: No primary care provider on file. Date of Admission: 5/28/2022    Date of Service: Pt seen/examined on 5/29/2022 and Admitted to Inpatient with expected LOS greater than two midnights due to medical therapy. Chief Complaint: Fatigue      History Of Present Illness:    63 y.o. male who presents with complaints of fatigue, generalized malaise and subjective fevers with diffuse watery diarrhea for the past 4 days. Patient started having diarrhea early this week, soon after his hemodialysis session on Tuesday. Due to ongoing diarrhea patient was not able to go for his hemodialysis on Thursday and Saturday. Currently his diarrhea is resolved. Initiation BMP showed potassium of 7.2 which was hemolyzed, but EKG was with peaked T waves. Patient was treated with calcium gluconate + insulin IV 10 units with 10% dextrose 250 mL, sodium bicarbonate 50 mEq x 1 and Lokelma. Repeat serum potassium was 5.4 and EKG was repeated which showed similar peaked T waves as seen in the previous EKG. Patient states that since Tuesday he has had profuse diarrhea with loose stools about 50 episodes for the past 4 days. Patient has tried Imodium, Pepto-Bismol without any relief of his symptoms. This was not associated with abdominal pain but patient had subjective fevers. His oral intake has been very poor, last ate 2 days ago after which he had nonbloody nonbilious emesis x1. Patient continues to feel nauseated and has been drinking small amounts of fluids. Patient currently complains of shortness of breath, but he does not seem to be volume overloaded or in respiratory distress. His oxygen saturations were 90% on room air and was placed on 2 L of supplemental oxygen.   Chest x-ray consistent with mild interstitial edema    Past Medical History:          Diagnosis Date    Chronic kidney disease     Hypertension        Past Surgical History:      No past surgical history on file. Medications Prior to Admission:      Prior to Admission medications    Medication Sig Start Date End Date Taking? Authorizing Provider   atorvastatin (LIPITOR) 40 MG tablet Take 40 mg by mouth daily     Historical Provider, MD   warfarin (COUMADIN) 5 MG tablet Take 2.5-5 mg by mouth daily 2.5mg on Mondays, 5mg all other days    Historical Provider, MD   carvedilol (COREG) 25 MG tablet Take 25 mg by mouth 2 times daily (with meals)     Historical Provider, MD   NIFEdipine (PROCARDIA XL) 60 MG extended release tablet Take 60 mg by mouth 2 times daily    Historical Provider, MD   SITagliptin (JANUVIA) 25 MG tablet Take 25 mg by mouth daily    Historical Provider, MD   aspirin 81 MG EC tablet Take 81 mg by mouth daily    Historical Provider, MD   DULoxetine (CYMBALTA) 60 MG extended release capsule Take 60 mg by mouth daily    Historical Provider, MD   gabapentin (NEURONTIN) 100 MG capsule Take 100 mg by mouth nightly. Historical Provider, MD   traZODone (DESYREL) 50 MG tablet Take 50 mg by mouth nightly    Historical Provider, MD   vitamin D (CHOLECALCIFEROL) 25 MCG (1000 UT) TABS tablet Take 1,000 Units by mouth daily    Historical Provider, MD   insulin glargine (LANTUS) 100 UNIT/ML injection vial Inject 5 Units into the skin nightly     Historical Provider, MD   insulin lispro (HUMALOG) 100 UNIT/ML injection vial Inject into the skin 3 times daily (before meals) Sliding scale    Historical Provider, MD   sevelamer (RENVELA) 800 MG tablet Take 2 tablets by mouth 3 times daily (with meals) 8/1/21   Doug Washington MD       Allergies:  Naproxen    Social History:    TOBACCO:   reports that he has been smoking. He has been smoking about 0.50 packs per day. He has never used smokeless tobacco.  ETOH:   reports previous alcohol use.   E-Cigarettes/Vaping Use     Questions Responses    E-Cigarette/Vaping Use     Start Date     Passive Exposure     Quit Date     Counseling Given     Comments Family History:    Reviewed in detail and negative for DM, CAD, Cancer, CVA. REVIEW OF SYSTEMS COMPLETED:   Pertinent positives as noted in the HPI. All other systems reviewed and negative. PHYSICAL EXAM PERFORMED:    BP (!) 190/98   Pulse 86   Temp 98.6 °F (37 °C) (Oral)   Resp 18   Ht 5' 7\" (1.702 m)   Wt 160 lb (72.6 kg)   SpO2 92%   BMI 25.06 kg/m²     General appearance:  No apparent distress, appears stated age and cooperative. HEENT:  Normal cephalic, atraumatic without obvious deformity. Pupils equal, round, and reactive to light. Extra ocular muscles intact. Conjunctivae/corneas clear. Neck: Supple, with full range of motion. No jugular venous distention. Trachea midline. Respiratory:  Normal respiratory effort. Clear to auscultation, bilaterally without Rales/Wheezes/Rhonchi. Cardiovascular:  Regular rate and rhythm with normal S1/S2 without murmurs, rubs or gallops. Abdomen: Soft, non-tender, non-distended with normal bowel sounds. Musculoskeletal:  No clubbing, cyanosis or edema bilaterally. Full range of motion without deformity. Skin: Skin color, texture, turgor normal.  No rashes or lesions. Neurologic:  Neurovascularly intact without any focal sensory/motor deficits.  Cranial nerves: II-XII intact, grossly non-focal.  Psychiatric:  Alert and oriented, thought content appropriate, normal insight  Capillary Refill: Brisk,3 seconds, normal  Peripheral Pulses: +2 palpable, equal bilaterally       Labs:     Recent Labs     05/28/22 1953   WBC 10.2   HGB 12.6*   HCT 38.5*        Recent Labs     05/28/22 1953 05/28/22 2224     --    K 7.2* 5.4*   CL 90*  --    CO2 22  --    BUN 67*  --    CREATININE 11.9*  --    CALCIUM 9.0  --      Recent Labs     05/28/22 1953   AST 25   ALT 7*   BILIDIR <0.2   BILITOT 0.3   ALKPHOS 74     Recent Labs     05/28/22 1953   INR 1.03     Recent Labs     05/28/22 1953   TROPONINI 0.12*       Urinalysis:    No results found for: Della Yanez, BACTERIA, RBCUA, BLOODU, [address], [address]    Radiology:   EKG:  I have reviewed the EKG with the following interpretation: Normal sinus rhythm, first-degree AV block, narrow QRS, QTC = 500 ms, T wave inversion in 1 and aVL with T wave flattening in V5 and V6. Unchanged from EKG on 11/13/2021      XR CHEST PORTABLE   Final Result      Stable cardiomegaly and mild interstitial edema. ASSESSMENT:    Active Hospital Problems    Diagnosis Date Noted    Encounter for hemodialysis [facility]) [Z99.2] 05/29/2022     Priority: Medium   #Acute respiratory failure with hypoxia  #ESRD on HD, missed sessions on Thursday and Saturday due to profuse diarrhea  #Suspected viral gastroenteritis, currently symptoms have resolved  #DM-2  #History of PAD, has been on warfarin. Patient states that he does not take warfarin anymore. His INR is normal  #Essential hypertension  #Recent TAVR procedure at [facility] on 5/13/2022-on aspirin and Plavix  #Mild elevation in troponin-could be secondary to recent TAVR procedure versus ESRD with missing 2 HD sessions. Patient does not have complaints of chest pain and EKG is unchanged from prior    PLAN:  -Continue on supplemental oxygen  -Monitor renal function and electrolytes  -Monitor on telemetry  -Nephrology consulted, no emergent HD is indicated as patient has no signs and symptoms of acute volume overload  -Continue on aspirin, Plavix and statins  -Continue home doses of Coreg, nifedipine  -Continue on current doses of Januvia, low-dose Lantus and low-dose SSI  -Continue his other home medications appropriately  -Pharmacy consult for verification of home medications  -Trend cardiac enzymes x2  -Supportive therapy      DVT Prophylaxis: Heparin  Diet: ADULT DIET; Regular; 3 carb choices (45 gm/meal); Low Fat/Low Chol/High Fiber/2 gm Na; Low Potassium (Less than 3000 mg/day); Low Phosphorus (Less than 1000 mg);  Less than 60 gm  Code Status: Full Code    PT/OT Eval Status: As tolerated    Dispo -GMF with telemetry       Tre Cummins MD    Thank you No primary care provider on file. for the opportunity to be involved in this patient's care. If you have any questions or concerns please feel free to contact me at [phone].

## 2022-05-29 NOTE — PROGRESS NOTES
Pt admitted to room 6314, A+O x4. 2 liters NC in place. Sr on telemetry. BP elevated 187/87. Pt denies any HA, chest pain or SOB. [place] to bed/room. Contact isolation for r/o C-diff. Call light in reach.

## 2022-05-29 NOTE — CONSULTS
Consult received  Full note to follow    Shun Packer MD  5/28/2022    [facility] [address]  Office : [phone]  Fax :[phone]

## 2022-05-29 NOTE — PROGRESS NOTES
4 Eyes Admission Assessment     I agree as the admission nurse that 2 RN's have performed a thorough Head to Toe Skin Assessment on the patient. ALL assessment sites listed below have been assessed on admission. Areas assessed by both nurses:   [x]   Head, Face, and Ears   [x]   Shoulders, Back, and Chest  [x]   Arms, Elbows, and Hands   [x]   Coccyx, Sacrum, and Ischium  [x]   Legs, Feet, and Heels        Does the Patient have Skin Breakdown?   No         Cezar Prevention initiated:  No   Wound Care Orders initiated:  No      [facility] nurse consulted for Pressure Injury (Stage 3,4, Unstageable, DTI, NWPT, and Complex wounds) or Cezar score 18 or lower:  No      Nurse 1 eSignature: Electronically signed by Arelis Mane RN on 5/29/22 at 2:32 AM EDT    **SHARE this note so that the co-signing nurse is able to place an eSignature**    Nurse 2 eSignature: Electronically signed by Regino Rader RN on 5/29/22 at 5:00 AM EDT

## 2022-05-29 NOTE — PROGRESS NOTES
Ino Choe RN states pt is signing out AMA, he can not reach his wife who is home disabled. Notified Dr Merlin Schilder per [facility] charge nurse, pt is refusing dialysis.

## 2022-05-29 NOTE — PROGRESS NOTES
Patient called into his room and he is very upset because he is his wife's caregiver. She is not answering th phone. He states his sister will be her at 1: 27 and whatever I need to do to get him out of here to do so . He states he will sign out AMA. Perfect serve sent to Dr. Elisabeth Barnes and I called dialysis.   Will message Dr. Migue Kirk

## 2022-05-29 NOTE — PROGRESS NOTES
[facility] Hospitalist, trop . 13, per Dr. Jairo Holland, obtain another Trop after Dialysis, next trop level @ 1100.

## 2022-05-30 ENCOUNTER — HOSPITAL ENCOUNTER (INPATIENT)
Age: 62
LOS: 1 days | Discharge: HOME | DRG: 640 | End: 2022-05-31
Attending: EMERGENCY MEDICINE | Admitting: INTERNAL MEDICINE
Payer: MEDICARE

## 2022-05-30 ENCOUNTER — APPOINTMENT (OUTPATIENT)
Dept: GENERAL RADIOLOGY | Age: 62
DRG: 640 | End: 2022-05-30
Payer: MEDICARE

## 2022-05-30 DIAGNOSIS — N18.6 ESRD ON DIALYSIS (HCC): ICD-10-CM

## 2022-05-30 DIAGNOSIS — E87.5 HYPERKALEMIA: ICD-10-CM

## 2022-05-30 DIAGNOSIS — J81.0 ACUTE PULMONARY EDEMA (HCC): Primary | ICD-10-CM

## 2022-05-30 DIAGNOSIS — Z99.2 ESRD ON DIALYSIS (HCC): ICD-10-CM

## 2022-05-30 LAB
ANION GAP SERPL CALCULATED.3IONS-SCNC: 28 MMOL/L (ref 3–16)
BASE EXCESS VENOUS: -4.4 MMOL/L (ref -2–3)
BASOPHILS ABSOLUTE: 0 K/UL (ref 0–0.2)
BASOPHILS RELATIVE PERCENT: 0.5 %
BUN BLDV-MCNC: 102 MG/DL (ref 7–20)
CALCIUM SERPL-MCNC: 8.7 MG/DL (ref 8.3–10.6)
CARBOXYHEMOGLOBIN: 3.1 % (ref 0–1.5)
CHLORIDE BLD-SCNC: 92 MMOL/L (ref 99–110)
CHP ED QC CHECK: NORMAL
CHP ED QC CHECK: NORMAL
CO2: 18 MMOL/L (ref 21–32)
CREAT SERPL-MCNC: 14.3 MG/DL (ref 0.9–1.3)
EOSINOPHILS ABSOLUTE: 0.2 K/UL (ref 0–0.6)
EOSINOPHILS RELATIVE PERCENT: 2.8 %
ESTIMATED AVERAGE GLUCOSE: 102.5 MG/DL
GFR AFRICAN AMERICAN: 4
GFR NON-AFRICAN AMERICAN: 4
GLUCOSE BLD-MCNC: 201 MG/DL
GLUCOSE BLD-MCNC: 201 MG/DL (ref 70–99)
GLUCOSE BLD-MCNC: 205 MG/DL
GLUCOSE BLD-MCNC: 205 MG/DL (ref 70–99)
GLUCOSE BLD-MCNC: 96 MG/DL (ref 70–99)
HBA1C MFR BLD: 5.2 %
HCO3 VENOUS: 21.1 MMOL/L (ref 24–28)
HCT VFR BLD CALC: 34.5 % (ref 40.5–52.5)
HEMOGLOBIN, VEN, REDUCED: 26 %
HEMOGLOBIN: 11.3 G/DL (ref 13.5–17.5)
INR BLD: 1.06 (ref 0.87–1.14)
LYMPHOCYTES ABSOLUTE: 1 K/UL (ref 1–5.1)
LYMPHOCYTES RELATIVE PERCENT: 12 %
MCH RBC QN AUTO: 30.6 PG (ref 26–34)
MCHC RBC AUTO-ENTMCNC: 32.6 G/DL (ref 31–36)
MCV RBC AUTO: 93.7 FL (ref 80–100)
METHEMOGLOBIN VENOUS: 0.4 % (ref 0–1.5)
MONOCYTES ABSOLUTE: 0.7 K/UL (ref 0–1.3)
MONOCYTES RELATIVE PERCENT: 8.3 %
NEUTROPHILS ABSOLUTE: 6.3 K/UL (ref 1.7–7.7)
NEUTROPHILS RELATIVE PERCENT: 76.4 %
O2 SAT, VEN: 73 %
PCO2, VEN: 39.2 MMHG (ref 41–51)
PDW BLD-RTO: 17.7 % (ref 12.4–15.4)
PERFORMED ON: ABNORMAL
PERFORMED ON: ABNORMAL
PH VENOUS: 7.34 (ref 7.35–7.45)
PHOSPHORUS: 11.9 MG/DL (ref 2.5–4.9)
PLATELET # BLD: 361 K/UL (ref 135–450)
PMV BLD AUTO: 8 FL (ref 5–10.5)
PO2, VEN: 45.5 MMHG (ref 25–40)
POTASSIUM REFLEX MAGNESIUM: 7.3 MMOL/L (ref 3.5–5.1)
POTASSIUM SERPL-SCNC: 8 MMOL/L (ref 3.5–5.1)
PRO-BNP: ABNORMAL PG/ML (ref 0–124)
PROTHROMBIN TIME: 13.7 SEC (ref 11.7–14.5)
RBC # BLD: 3.69 M/UL (ref 4.2–5.9)
SODIUM BLD-SCNC: 138 MMOL/L (ref 136–145)
TCO2 CALC VENOUS: 22 MMOL/L
TROPONIN: 0.11 NG/ML
TROPONIN: 0.14 NG/ML
WBC # BLD: 8.3 K/UL (ref 4–11)

## 2022-05-30 PROCEDURE — 94660 CPAP INITIATION&MGMT: CPT

## 2022-05-30 PROCEDURE — 94644 CONT INHLJ TX 1ST HOUR: CPT

## 2022-05-30 PROCEDURE — 99285 EMERGENCY DEPT VISIT HI MDM: CPT

## 2022-05-30 PROCEDURE — 6370000000 HC RX 637 (ALT 250 FOR IP): Performed by: PHYSICIAN ASSISTANT

## 2022-05-30 PROCEDURE — 2500000003 HC RX 250 WO HCPCS: Performed by: PHYSICIAN ASSISTANT

## 2022-05-30 PROCEDURE — 85025 COMPLETE CBC W/AUTO DIFF WBC: CPT

## 2022-05-30 PROCEDURE — 6360000002 HC RX W HCPCS: Performed by: PHYSICIAN ASSISTANT

## 2022-05-30 PROCEDURE — 96375 TX/PRO/DX INJ NEW DRUG ADDON: CPT

## 2022-05-30 PROCEDURE — 84132 ASSAY OF SERUM POTASSIUM: CPT

## 2022-05-30 PROCEDURE — 94761 N-INVAS EAR/PLS OXIMETRY MLT: CPT

## 2022-05-30 PROCEDURE — 96374 THER/PROPH/DIAG INJ IV PUSH: CPT

## 2022-05-30 PROCEDURE — 84100 ASSAY OF PHOSPHORUS: CPT

## 2022-05-30 PROCEDURE — 71046 X-RAY EXAM CHEST 2 VIEWS: CPT

## 2022-05-30 PROCEDURE — 85610 PROTHROMBIN TIME: CPT

## 2022-05-30 PROCEDURE — 84484 ASSAY OF TROPONIN QUANT: CPT

## 2022-05-30 PROCEDURE — 82803 BLOOD GASES ANY COMBINATION: CPT

## 2022-05-30 PROCEDURE — 93005 ELECTROCARDIOGRAM TRACING: CPT | Performed by: PHYSICIAN ASSISTANT

## 2022-05-30 PROCEDURE — 2700000000 HC OXYGEN THERAPY PER DAY

## 2022-05-30 PROCEDURE — 2580000003 HC RX 258: Performed by: PHYSICIAN ASSISTANT

## 2022-05-30 PROCEDURE — 36415 COLL VENOUS BLD VENIPUNCTURE: CPT

## 2022-05-30 PROCEDURE — 80048 BASIC METABOLIC PNL TOTAL CA: CPT

## 2022-05-30 PROCEDURE — 83880 ASSAY OF NATRIURETIC PEPTIDE: CPT

## 2022-05-30 PROCEDURE — 6360000002 HC RX W HCPCS: Performed by: EMERGENCY MEDICINE

## 2022-05-30 RX ORDER — ONDANSETRON 2 MG/ML
4 INJECTION INTRAMUSCULAR; INTRAVENOUS ONCE
Status: COMPLETED | OUTPATIENT
Start: 2022-05-30 | End: 2022-05-30

## 2022-05-30 RX ORDER — CALCIUM GLUCONATE 94 MG/ML
1000 INJECTION, SOLUTION INTRAVENOUS ONCE
Status: COMPLETED | OUTPATIENT
Start: 2022-05-30 | End: 2022-05-30

## 2022-05-30 RX ORDER — ALBUTEROL SULFATE 2.5 MG/3ML
10 SOLUTION RESPIRATORY (INHALATION) ONCE
Status: COMPLETED | OUTPATIENT
Start: 2022-05-30 | End: 2022-05-30

## 2022-05-30 RX ORDER — DEXTROSE MONOHYDRATE 50 MG/ML
100 INJECTION, SOLUTION INTRAVENOUS PRN
Status: DISCONTINUED | OUTPATIENT
Start: 2022-05-30 | End: 2022-05-31 | Stop reason: HOSPADM

## 2022-05-30 RX ORDER — NITROGLYCERIN 0.4 MG/1
0.4 TABLET SUBLINGUAL EVERY 5 MIN PRN
Status: DISCONTINUED | OUTPATIENT
Start: 2022-05-30 | End: 2022-05-31 | Stop reason: HOSPADM

## 2022-05-30 RX ADMIN — SODIUM ZIRCONIUM CYCLOSILICATE 10 G: 10 POWDER, FOR SUSPENSION ORAL at 22:40

## 2022-05-30 RX ADMIN — ONDANSETRON 4 MG: 2 INJECTION INTRAMUSCULAR; INTRAVENOUS at 22:01

## 2022-05-30 RX ADMIN — INSULIN HUMAN 10 UNITS: 100 INJECTION, SOLUTION PARENTERAL at 21:58

## 2022-05-30 RX ADMIN — ALBUTEROL SULFATE 10 MG: 2.5 SOLUTION RESPIRATORY (INHALATION) at 22:06

## 2022-05-30 RX ADMIN — SODIUM BICARBONATE 50 MEQ: 84 INJECTION, SOLUTION INTRAVENOUS at 22:00

## 2022-05-30 RX ADMIN — DEXTROSE MONOHYDRATE 250 ML: 100 INJECTION, SOLUTION INTRAVENOUS at 21:59

## 2022-05-30 RX ADMIN — CALCIUM GLUCONATE 1000 MG: 98 INJECTION, SOLUTION INTRAVENOUS at 21:56

## 2022-05-30 ASSESSMENT — ENCOUNTER SYMPTOMS
ABDOMINAL PAIN: 0
SORE THROAT: 0
COLOR CHANGE: 0
SHORTNESS OF BREATH: 1
VOMITING: 0
COUGH: 0
RHINORRHEA: 0
NAUSEA: 0

## 2022-05-31 VITALS
HEIGHT: 69 IN | WEIGHT: 142.86 LBS | RESPIRATION RATE: 16 BRPM | HEART RATE: 68 BPM | DIASTOLIC BLOOD PRESSURE: 84 MMHG | TEMPERATURE: 97.8 F | BODY MASS INDEX: 21.16 KG/M2 | OXYGEN SATURATION: 95 % | SYSTOLIC BLOOD PRESSURE: 167 MMHG

## 2022-05-31 PROBLEM — E87.70 VOLUME OVERLOAD: Status: ACTIVE | Noted: 2022-01-01

## 2022-05-31 LAB
ALBUMIN SERPL-MCNC: 4.1 G/DL (ref 3.4–5)
ANION GAP SERPL CALCULATED.3IONS-SCNC: 24 MMOL/L (ref 3–16)
BASOPHILS ABSOLUTE: 0.1 K/UL (ref 0–0.2)
BASOPHILS RELATIVE PERCENT: 0.9 %
BUN BLDV-MCNC: 58 MG/DL (ref 7–20)
CALCIUM SERPL-MCNC: 9 MG/DL (ref 8.3–10.6)
CHLORIDE BLD-SCNC: 91 MMOL/L (ref 99–110)
CO2: 21 MMOL/L (ref 21–32)
CREAT SERPL-MCNC: 9.7 MG/DL (ref 0.9–1.3)
EKG ATRIAL RATE: 82 BPM
EKG DIAGNOSIS: NORMAL
EKG P AXIS: 48 DEGREES
EKG P-R INTERVAL: 260 MS
EKG Q-T INTERVAL: 454 MS
EKG QRS DURATION: 108 MS
EKG QTC CALCULATION (BAZETT): 530 MS
EKG R AXIS: 23 DEGREES
EKG T AXIS: 103 DEGREES
EKG VENTRICULAR RATE: 82 BPM
EOSINOPHILS ABSOLUTE: 0.1 K/UL (ref 0–0.6)
EOSINOPHILS RELATIVE PERCENT: 0.9 %
GFR AFRICAN AMERICAN: 7
GFR NON-AFRICAN AMERICAN: 6
GLUCOSE BLD-MCNC: 126 MG/DL (ref 70–99)
GLUCOSE BLD-MCNC: 190 MG/DL (ref 70–99)
GLUCOSE BLD-MCNC: 99 MG/DL (ref 70–99)
HCT VFR BLD CALC: 33.9 % (ref 40.5–52.5)
HEMOGLOBIN: 11 G/DL (ref 13.5–17.5)
LYMPHOCYTES ABSOLUTE: 0.6 K/UL (ref 1–5.1)
LYMPHOCYTES RELATIVE PERCENT: 7.8 %
MAGNESIUM: 2.5 MG/DL (ref 1.8–2.4)
MCH RBC QN AUTO: 30.4 PG (ref 26–34)
MCHC RBC AUTO-ENTMCNC: 32.5 G/DL (ref 31–36)
MCV RBC AUTO: 93.6 FL (ref 80–100)
MONOCYTES ABSOLUTE: 0.6 K/UL (ref 0–1.3)
MONOCYTES RELATIVE PERCENT: 8.1 %
NEUTROPHILS ABSOLUTE: 6.2 K/UL (ref 1.7–7.7)
NEUTROPHILS RELATIVE PERCENT: 82.3 %
PDW BLD-RTO: 17.4 % (ref 12.4–15.4)
PERFORMED ON: ABNORMAL
PERFORMED ON: ABNORMAL
PHOSPHORUS: 7.1 MG/DL (ref 2.5–4.9)
PLATELET # BLD: 343 K/UL (ref 135–450)
PMV BLD AUTO: 8.1 FL (ref 5–10.5)
POTASSIUM SERPL-SCNC: 4.8 MMOL/L (ref 3.5–5.1)
RBC # BLD: 3.62 M/UL (ref 4.2–5.9)
SODIUM BLD-SCNC: 136 MMOL/L (ref 136–145)
WBC # BLD: 7.6 K/UL (ref 4–11)

## 2022-05-31 PROCEDURE — 90935 HEMODIALYSIS ONE EVALUATION: CPT

## 2022-05-31 PROCEDURE — 36415 COLL VENOUS BLD VENIPUNCTURE: CPT

## 2022-05-31 PROCEDURE — 90935 HEMODIALYSIS ONE EVALUATION: CPT | Performed by: INTERNAL MEDICINE

## 2022-05-31 PROCEDURE — 6370000000 HC RX 637 (ALT 250 FOR IP): Performed by: STUDENT IN AN ORGANIZED HEALTH CARE EDUCATION/TRAINING PROGRAM

## 2022-05-31 PROCEDURE — 80069 RENAL FUNCTION PANEL: CPT

## 2022-05-31 PROCEDURE — 99223 1ST HOSP IP/OBS HIGH 75: CPT | Performed by: INTERNAL MEDICINE

## 2022-05-31 PROCEDURE — 94761 N-INVAS EAR/PLS OXIMETRY MLT: CPT

## 2022-05-31 PROCEDURE — 2060000000 HC ICU INTERMEDIATE R&B

## 2022-05-31 PROCEDURE — 83735 ASSAY OF MAGNESIUM: CPT

## 2022-05-31 PROCEDURE — 97161 PT EVAL LOW COMPLEX 20 MIN: CPT

## 2022-05-31 PROCEDURE — 85025 COMPLETE CBC W/AUTO DIFF WBC: CPT

## 2022-05-31 PROCEDURE — 97116 GAIT TRAINING THERAPY: CPT

## 2022-05-31 PROCEDURE — 2580000003 HC RX 258: Performed by: STUDENT IN AN ORGANIZED HEALTH CARE EDUCATION/TRAINING PROGRAM

## 2022-05-31 PROCEDURE — 5A1D70Z PERFORMANCE OF URINARY FILTRATION, INTERMITTENT, LESS THAN 6 HOURS PER DAY: ICD-10-PCS | Performed by: HOSPITALIST

## 2022-05-31 PROCEDURE — 2700000000 HC OXYGEN THERAPY PER DAY

## 2022-05-31 RX ORDER — TRAZODONE HYDROCHLORIDE 50 MG/1
50 TABLET ORAL NIGHTLY PRN
Status: DISCONTINUED | OUTPATIENT
Start: 2022-05-31 | End: 2022-05-31 | Stop reason: HOSPADM

## 2022-05-31 RX ORDER — DULOXETIN HYDROCHLORIDE 60 MG/1
60 CAPSULE, DELAYED RELEASE ORAL DAILY
Status: DISCONTINUED | OUTPATIENT
Start: 2022-05-31 | End: 2022-05-31 | Stop reason: HOSPADM

## 2022-05-31 RX ORDER — NIFEDIPINE 60 MG/1
60 TABLET, EXTENDED RELEASE ORAL 2 TIMES DAILY
Status: DISCONTINUED | OUTPATIENT
Start: 2022-05-31 | End: 2022-05-31 | Stop reason: HOSPADM

## 2022-05-31 RX ORDER — CLOPIDOGREL BISULFATE 75 MG/1
75 TABLET ORAL DAILY
Status: DISCONTINUED | OUTPATIENT
Start: 2022-05-31 | End: 2022-05-31 | Stop reason: HOSPADM

## 2022-05-31 RX ORDER — POLYETHYLENE GLYCOL 3350 17 G/17G
17 POWDER, FOR SOLUTION ORAL DAILY PRN
Status: DISCONTINUED | OUTPATIENT
Start: 2022-05-31 | End: 2022-05-31 | Stop reason: HOSPADM

## 2022-05-31 RX ORDER — SODIUM CHLORIDE 9 MG/ML
INJECTION, SOLUTION INTRAVENOUS PRN
Status: DISCONTINUED | OUTPATIENT
Start: 2022-05-31 | End: 2022-05-31 | Stop reason: HOSPADM

## 2022-05-31 RX ORDER — CARVEDILOL 25 MG/1
25 TABLET ORAL 2 TIMES DAILY WITH MEALS
Status: DISCONTINUED | OUTPATIENT
Start: 2022-05-31 | End: 2022-05-31 | Stop reason: HOSPADM

## 2022-05-31 RX ORDER — LOSARTAN POTASSIUM 25 MG/1
25 TABLET ORAL DAILY
COMMUNITY

## 2022-05-31 RX ORDER — ONDANSETRON 2 MG/ML
4 INJECTION INTRAMUSCULAR; INTRAVENOUS EVERY 6 HOURS PRN
Status: DISCONTINUED | OUTPATIENT
Start: 2022-05-31 | End: 2022-05-31 | Stop reason: HOSPADM

## 2022-05-31 RX ORDER — ATORVASTATIN CALCIUM 40 MG/1
40 TABLET, FILM COATED ORAL DAILY
Status: DISCONTINUED | OUTPATIENT
Start: 2022-05-31 | End: 2022-05-31 | Stop reason: HOSPADM

## 2022-05-31 RX ORDER — INSULIN LISPRO 100 [IU]/ML
0-6 INJECTION, SOLUTION INTRAVENOUS; SUBCUTANEOUS NIGHTLY
Status: DISCONTINUED | OUTPATIENT
Start: 2022-05-31 | End: 2022-05-31 | Stop reason: HOSPADM

## 2022-05-31 RX ORDER — SODIUM CHLORIDE 0.9 % (FLUSH) 0.9 %
5-40 SYRINGE (ML) INJECTION EVERY 12 HOURS SCHEDULED
Status: DISCONTINUED | OUTPATIENT
Start: 2022-05-31 | End: 2022-05-31 | Stop reason: HOSPADM

## 2022-05-31 RX ORDER — CLOPIDOGREL BISULFATE 75 MG/1
75 TABLET ORAL DAILY
COMMUNITY

## 2022-05-31 RX ORDER — ASPIRIN 81 MG/1
81 TABLET ORAL DAILY
Status: DISCONTINUED | OUTPATIENT
Start: 2022-05-31 | End: 2022-05-31 | Stop reason: HOSPADM

## 2022-05-31 RX ORDER — ACETAMINOPHEN 650 MG/1
650 SUPPOSITORY RECTAL EVERY 6 HOURS PRN
Status: DISCONTINUED | OUTPATIENT
Start: 2022-05-31 | End: 2022-05-31 | Stop reason: HOSPADM

## 2022-05-31 RX ORDER — ONDANSETRON 4 MG/1
4 TABLET, ORALLY DISINTEGRATING ORAL EVERY 8 HOURS PRN
Status: DISCONTINUED | OUTPATIENT
Start: 2022-05-31 | End: 2022-05-31 | Stop reason: HOSPADM

## 2022-05-31 RX ORDER — ACETAMINOPHEN 325 MG/1
650 TABLET ORAL EVERY 6 HOURS PRN
Status: DISCONTINUED | OUTPATIENT
Start: 2022-05-31 | End: 2022-05-31 | Stop reason: HOSPADM

## 2022-05-31 RX ORDER — SEVELAMER CARBONATE 800 MG/1
2400 TABLET, FILM COATED ORAL
Status: DISCONTINUED | OUTPATIENT
Start: 2022-05-31 | End: 2022-05-31 | Stop reason: HOSPADM

## 2022-05-31 RX ORDER — VITAMIN B COMPLEX
1000 TABLET ORAL DAILY
Status: DISCONTINUED | OUTPATIENT
Start: 2022-05-31 | End: 2022-05-31 | Stop reason: HOSPADM

## 2022-05-31 RX ORDER — SODIUM CHLORIDE 0.9 % (FLUSH) 0.9 %
5-40 SYRINGE (ML) INJECTION PRN
Status: DISCONTINUED | OUTPATIENT
Start: 2022-05-31 | End: 2022-05-31 | Stop reason: HOSPADM

## 2022-05-31 RX ORDER — INSULIN LISPRO 100 [IU]/ML
0-12 INJECTION, SOLUTION INTRAVENOUS; SUBCUTANEOUS
Status: DISCONTINUED | OUTPATIENT
Start: 2022-05-31 | End: 2022-05-31 | Stop reason: HOSPADM

## 2022-05-31 RX ORDER — GABAPENTIN 100 MG/1
100 CAPSULE ORAL NIGHTLY
Status: DISCONTINUED | OUTPATIENT
Start: 2022-05-31 | End: 2022-05-31 | Stop reason: HOSPADM

## 2022-05-31 RX ORDER — FUROSEMIDE 40 MG/1
40 TABLET ORAL DAILY
COMMUNITY

## 2022-05-31 RX ORDER — HYDRALAZINE HYDROCHLORIDE 20 MG/ML
10 INJECTION INTRAMUSCULAR; INTRAVENOUS EVERY 6 HOURS PRN
Status: DISCONTINUED | OUTPATIENT
Start: 2022-05-31 | End: 2022-05-31 | Stop reason: HOSPADM

## 2022-05-31 RX ORDER — HEPARIN SODIUM 5000 [USP'U]/ML
5000 INJECTION, SOLUTION INTRAVENOUS; SUBCUTANEOUS 3 TIMES DAILY
Status: DISCONTINUED | OUTPATIENT
Start: 2022-05-31 | End: 2022-05-31 | Stop reason: HOSPADM

## 2022-05-31 RX ADMIN — SODIUM CHLORIDE, PRESERVATIVE FREE 10 ML: 5 INJECTION INTRAVENOUS at 08:58

## 2022-05-31 RX ADMIN — SEVELAMER CARBONATE 2400 MG: 800 TABLET, FILM COATED ORAL at 08:56

## 2022-05-31 RX ADMIN — DULOXETINE HYDROCHLORIDE 60 MG: 60 CAPSULE, DELAYED RELEASE ORAL at 08:57

## 2022-05-31 RX ADMIN — NIFEDIPINE 60 MG: 60 TABLET, FILM COATED, EXTENDED RELEASE ORAL at 08:57

## 2022-05-31 RX ADMIN — Medication 1000 UNITS: at 08:57

## 2022-05-31 RX ADMIN — CLOPIDOGREL BISULFATE 75 MG: 75 TABLET, FILM COATED ORAL at 08:57

## 2022-05-31 RX ADMIN — CARVEDILOL 25 MG: 25 TABLET, FILM COATED ORAL at 08:57

## 2022-05-31 RX ADMIN — ASPIRIN 81 MG: 81 TABLET, COATED ORAL at 08:57

## 2022-05-31 RX ADMIN — ATORVASTATIN CALCIUM 40 MG: 40 TABLET, FILM COATED ORAL at 08:57

## 2022-05-31 ASSESSMENT — PAIN SCALES - GENERAL: PAINLEVEL_OUTOF10: 0

## 2022-05-31 NOTE — ED NOTES
Spoke with dialysis Tech on phone, will give call back when determination has been made if patient is going to be ICU or PCU status      Az Dhillon RN  05/30/22 8516

## 2022-05-31 NOTE — ED NOTES
Report given to [facility]. Patient transported to dialysis on cardiac monitor by myself at this time.       Marianna Mayfield RN  05/31/22 0067

## 2022-05-31 NOTE — ED NOTES
Patient changed from Bipap to 5L NC at this time by RT. Oriented x4, pending bed level of care decision.       Anais Dominguez RN  05/30/22 2829

## 2022-05-31 NOTE — CARE COORDINATION
Case Management Assessment           Initial Evaluation                Date / Time of Evaluation: 5/31/2022 12:26 PM                 Assessment Completed by: Ivan Rubio RN    Patient Name: Patrice Taylor     YOB: 1960  Diagnosis: Hyperkalemia [E87.5]  Acute pulmonary edema ([facility] [address] [J81.0]  ESRD on dialysis ([facility] [address] [N18.6, Z99.2]  Volume overload [E87.70]     Date / Time: 5/30/2022  8:35 PM    Patient Admission Status: Inpatient    If patient is discharged prior to next notation, then this note serves as note for discharge by case management. Current PCP: No primary care provider on file. Clinic Patient: No    Chart Reviewed: Yes  Patient/ Family Interviewed: Yes    Initial assessment completed at bedside with: patient    Hospitalization in the last 30 days: Yes    Emergency Contacts:  Extended Emergency Contact Information  Primary Emergency Contact: Cristofer Roman, [address] [facility] Phone: [phone]  Relation: Spouse  Preferred language: English   needed? No    Advance Directives:   Code Status: Full Code    Healthcare Power of : No    Financial  Payor: Russell Skelton / Plan: Shanta Javed PPO / Product Type: Medicare /     Pre-cert required for SNF: Yes    Pharmacy    [id number] [place], [address] [phone]  [address] [place] [address] [address] [id number]  Phone: [phone] Fax: [phone]      Potential assistance Purchasing Medications: Potential Assistance Purchasing Medications: No  Does Patient want to participate in local refill/ meds to beds program?: Yes    Meds To Beds General Rules:  1. Can ONLY be done Monday- Friday between 8:30am-5pm  2. Prescription(s) must be in pharmacy by 3pm to be filled same day  3. Copy of patient's insurance/ prescription drug card and patient face sheet must be sent along with the prescription(s)  4. Cost of Rx cannot be added to hospital bill.  If financial assistance is needed, please contact unit  or ;  or  CANNOT provide pharmacy voucher for patients co-pays  5. Patients can then  the prescription on their way out of the hospital at discharge, or pharmacy can deliver to the bedside if staff is available. (payment due at time of pick-up or delivery - cash, check, or card accepted)     Able to afford home medications/ co-pay costs: Yes    ADLS  Support Systems: Spouse/Significant Other    PT AM-PAC:   /24  OT AM-PAC:   /24    [place]: From home with wife in a one level home. Steps: 4 into home from back porch     Plans to RETURN to current housing: Yes  Barriers to RETURNING to current housing: medical complications     Home Care Information  Currently ACTIVE with [address]: No  Home Care Agency: Not Applicable    Dialysis  Active with HD/PD prior to admission: Yes      HD Center:   [facility]   Address: [facility]  Phone: [phone]   Nick Lowry    DISCHARGE PLAN:  Disposition: Home- No Services Needed    Transportation PLAN for discharge: family     Factors facilitating achievement of predicted outcomes: Family support, Motivated, Cooperative and Pleasant    Barriers to discharge: Medical complications and Medication managment    Additional Case Management Notes: CM met with pt at bedside. He is from home with wife and drives. Has HD TTHS at [facility]. Sister to transport at discharge. No CM needs anticipated.      The Plan for Transition of Care is related to the following treatment goals of Hyperkalemia [E87.5]  Acute pulmonary edema ([facility] [address] [J81.0]  ESRD on dialysis ([facility] [address] [N18.6, Z99.2]  Volume overload [E87.70]    The Patient and/or patient representative Buffy Matthew and his family were provided with a choice of provider and agrees with the discharge plan Yes    Freedom of choice list was provided with basic dialogue that supports the patient's individualized plan of care/goals and shares the quality data associated with the providers.  Yes    Care Transition patient: Yes    Gena Boles RN  [facility]  Case Management Department  Ph: [phone]   Fax: [phone]

## 2022-05-31 NOTE — ED NOTES
RT at bedside, vital signs recently obtained. Patient given warm blanket. No current needs at this time.       Diana Osullivan RN  05/30/22 0366

## 2022-05-31 NOTE — PLAN OF CARE
Problem: Discharge Planning  Goal: Discharge to home or other facility with appropriate resources  5/31/2022 1418 by Russell Linares RN  Outcome: Adequate for Discharge  5/31/2022 0957 by Russell Linares RN  Outcome: Progressing     Problem: Pain  Goal: Verbalizes/displays adequate comfort level or baseline comfort level  5/31/2022 1418 by Russell Linares RN  Outcome: Adequate for Discharge  5/31/2022 0957 by Russell Linares RN  Outcome: Progressing     Problem: ABCDS Injury Assessment  Goal: Absence of physical injury  5/31/2022 1418 by Russell Linares RN  Outcome: Adequate for Discharge  5/31/2022 0957 by Russell Linares RN  Outcome: Progressing  Flowsheets (Taken 5/31/2022 0830)  Absence of Physical Injury: Implement safety measures based on patient assessment  5/31/2022 0618 by Anh Ramos  Outcome: Progressing  Flowsheets (Taken 5/31/2022 1741)  Absence of Physical Injury: Implement safety measures based on patient assessment     Problem: Cardiovascular - Adult  Goal: Maintains optimal cardiac output and hemodynamic stability  5/31/2022 1418 by Russell Linares RN  Outcome: Adequate for Discharge  5/31/2022 0957 by Russell Linares RN  Outcome: Progressing  Flowsheets (Taken 5/31/2022 0830)  Maintains optimal cardiac output and hemodynamic stability: Monitor blood pressure and heart rate  5/31/2022 0618 by Anh Ramos  Outcome: Progressing  Flowsheets (Taken 5/31/2022 0618)  Maintains optimal cardiac output and hemodynamic stability:   Monitor blood pressure and heart rate   Assess for signs of decreased cardiac output     Problem: Respiratory - Adult  Goal: Achieves optimal ventilation and oxygenation  5/31/2022 1418 by Russell Linares RN  Outcome: Adequate for Discharge  5/31/2022 0957 by Russell Linares RN  Outcome: Progressing  Flowsheets (Taken 5/31/2022 0830)  Achieves optimal ventilation and oxygenation: Assess for changes in respiratory status  5/31/2022 0618 by Anh Ramos  Outcome: Progressing  Flowsheets (Taken 5/31/2022 0618)  Achieves optimal ventilation and oxygenation:   Assess for changes in respiratory status   Assess and instruct to report shortness of breath or any respiratory difficulty   Oxygen supplementation based on oxygen saturation or arterial blood gases     Problem: Metabolic/Fluid and Electrolytes - Adult  Goal: Electrolytes maintained within normal limits  5/31/2022 1418 by Dov Yoon RN  Outcome: Adequate for Discharge  5/31/2022 0957 by Dov Yoon RN  Outcome: Progressing  Flowsheets (Taken 5/31/2022 0830)  Electrolytes maintained within normal limits: Monitor labs and assess patient for signs and symptoms of electrolyte imbalances  5/31/2022 0618 by Jaciel Zendejas  Outcome: Progressing  Flowsheets (Taken 5/31/2022 3995)  Electrolytes maintained within normal limits:   Monitor labs and assess patient for signs and symptoms of electrolyte imbalances   Administer electrolyte replacement as ordered  Goal: Glucose maintained within prescribed range  5/31/2022 1418 by Dov Yoon RN  Outcome: Adequate for Discharge  5/31/2022 0957 by Dov Yoon RN  Outcome: Progressing  Flowsheets (Taken 5/31/2022 0830)  Glucose maintained within prescribed range: Monitor blood glucose as ordered  5/31/2022 0618 by Jaciel Zendejas  Outcome: Progressing  Flowsheets (Taken 5/31/2022 0618)  Glucose maintained within prescribed range:   Monitor blood glucose as ordered   Administer ordered medications to maintain glucose within target range     Problem: Chronic Conditions and Co-morbidities  Goal: Patient's chronic conditions and co-morbidity symptoms are monitored and maintained or improved  5/31/2022 1418 by Dov Yoon RN  Outcome: Adequate for Discharge  5/31/2022 0957 by Dov Yoon RN  Outcome: Progressing

## 2022-05-31 NOTE — PROGRESS NOTES
Progress Note    Admit Date: 5/30/2022  Day: 0  Diet: ADULT DIET; Regular; 3 carb choices (45 gm/meal); Low Potassium (Less than 3000 mg/day); Low Phosphorus (Less than 1000 mg)    CC: SOB    Interval history:   NAONE. Patient seen and examined this am. S/P ugent dialysis overnight. He is tired and does not want to cooperate with full ROS. Is denying any CP, SOB, N/V or abdominal pain at this time. HPI:  Scot Dumont a 63 y.o. male PMHx ESRD on HD, T2DM, HTN, CAD, AS s/p TAVR pw SOB. Supposed to get HD on TTS but hasnt hjad since last Tuesday. Admitted to hospital 5/29 for fatigue, diarrhea, and SOB consequently missing HD sessions with plans for HD but left AMA. Was going to wait until tomorrow for HD but could not due to SOB so came into ED. Pt has been admitted for similar issues in the past.     On arrival to ED, was hypertensive and hypoxic to 87% on 4L. Placed on BiPAP w/ 60% FiO2 which was slowly titrated down to 5L NC. Has had issues with hemolyzed samples in past and also today but K in 7s with hemolysis and ED went ahead and treated with Ca gluconate, insulin + D50, sodium bicarb, and lokelma d/t prolongation of VT and QT intervals on EKG.  Admitted for volume overload and possible hyperkalemia r/t HD noncompliance.       Medications:     Scheduled Meds:   aspirin  81 mg Oral Daily    atorvastatin  40 mg Oral Daily    carvedilol  25 mg Oral BID WC    clopidogrel  75 mg Oral Daily    DULoxetine  60 mg Oral Daily    gabapentin  100 mg Oral Nightly    insulin glargine  5 Units SubCUTAneous Nightly    insulin lispro  0-12 Units SubCUTAneous TID WC    insulin lispro  0-6 Units SubCUTAneous Nightly    sevelamer  2,400 mg Oral TID WC    Vitamin D  1,000 Units Oral Daily    NIFEdipine  60 mg Oral BID    sodium chloride flush  5-40 mL IntraVENous 2 times per day    heparin (porcine)  5,000 Units SubCUTAneous TID     Continuous Infusions:   sodium chloride      dextrose       PRN Meds:traZODone, < > 6.5* 7.3*  --  8.0*  --   --  4.8   CL  --  92* 92*  --   --   --   --  91*   CO2  --  17* 18*  --   --   --   --  21   BUN  --  72* 102*  --   --   --   --  58*   CREATININE  --  12.9* 14.3*  --   --   --   --  9.7*   GLUCOSE   < > 95 96   < >  --  205 201 99   CALCIUM  --  8.9 8.7  --   --   --   --  9.0   MG  --   --   --   --   --   --   --  2.50*   PHOS  --   --  11.9*  --   --   --   --  7.1*   ANIONGAP  --  31* 28*  --   --   --   --  24*    < > = values in this interval not displayed. Hepatic:   Recent Labs     05/28/22 1953 05/31/22  0449   AST 25  --    ALT 7*  --    BILITOT 0.3  --    BILIDIR <0.2  --    PROT 7.8  --    LABALBU 4.2 4.1   ALKPHOS 74  --      Troponin:   Recent Labs     05/29/22  1152 05/30/22 2059 05/30/22  2249   TROPONINI 0.13* 0.14* 0.11*     BNP: No results for input(s): BNP in the last 72 hours. Lipids: No results for input(s): CHOL, HDL in the last 72 hours. Invalid input(s): LDLCALCU, TRIGLYCERIDE  ABGs:  No results for input(s): PHART, CFX9KPQ, PO2ART, HOD6MJC, BEART, THGBART, B9UABGOZ, KGJ8DMW in the last 72 hours. INR:   Recent Labs     05/28/22 1953 05/30/22  2100   INR 1.03 1.06     Lactate: No results for input(s): LACTATE in the last 72 hours. Cultures:  -----------------------------------------------------------------  RAD:   XR CHEST (2 VW)   Final Result      Similar appearance of bilateral interstitial opacities suggesting edema. Assessment/Plan:     HTN urgency, flash pulmonary edema 2/2 volume overload  Due to HD noncompliance requiring BiPAP in ED weaned down to NC. BP originally in 210s/100s improved with improved resp status  - Nephro consulted for HD  - Continue home coreg, nifedipine    Acute hypoxic respiratory failure 2/2 flash pulmonary edema   - Treat as above  - wean supp O2 as tolerated    ESRD  HD on TTS  - Nephro consult  - Cont home sevelamer and vitamin D     Hyperkalemia  Related to HD noncompliance.  s/p Ca gluc, insulin and D50, sodium bicarb and lokelma  - Needs HD   - monitor daily     Chronic Medical Conditions:  DM2: Gabapentin, lantus 5 units nightly, MDSSI, POC glucose checks, hypoglycemia protocol  CAD: Cont home ASA/plavix, atorvastatin and BB  Depression: Cont home cymbalta      Code Status: Full Code  FEN: ADULT DIET; Regular; 3 carb choices (45 gm/meal); Low Potassium (Less than 3000 mg/day);  Low Phosphorus (Less than 1000 mg)  PPX: SQH  DISPO: IP    Francena Pallas, MD, PGY-1  05/31/22  8:59 AM    This patient has been staffed and discussed with Bridger Hoffmann MD.

## 2022-05-31 NOTE — PROGRESS NOTES
Physical Therapy  Facility/Department: [place] [address] [facility]  Physical Therapy Initial Assessment and Treatment    Name: Patrice Taylor  : 1960  MRN: [id number]  Date of Service: 2022    Discharge Recommendations:    Patrice Taylor scored a 21/24 on the AM-PAC short mobility form. Current research shows that an AM-PAC score of 18 or greater is typically associated with a discharge to the patient's home setting. Based on the patient's AM-PAC score and their current functional mobility deficits, it is recommended that the patient have 2-3 sessions per week of Physical Therapy at d/c to increase the patient's independence. At this time, this patient demonstrates the endurance and safety to discharge home with home PT and a follow up treatment frequency of 2-3x/wk. Please see assessment section for further patient specific details. If patient discharges prior to next session this note will serve as a discharge summary. Please see below for the latest assessment towards goals. PT Equipment Recommendations  Equipment Needed: No      Patient Diagnosis(es): The primary encounter diagnosis was Acute pulmonary edema ([facility] [address] Diagnoses of ESRD on dialysis [facility]) and Hyperkalemia were also pertinent to this visit. Past Medical History:  has a past medical history of Chronic kidney disease, Diabetes mellitus ([address] Hyperlipidemia, and Hypertension. Past Surgical History:  has a past surgical history that includes Cardiac surgery. Assessment   Body Structures, Functions, Activity Limitations Requiring Skilled Therapeutic Intervention: Decreased functional mobility ; Decreased endurance  Assessment: Pt with slightly decreased independent mobility from baseline. Pt reports normally independent with mobility without AD, cares for wife. Currently needing SBA for safety. Should progress well with gradual increase in activity. Pt plans to return home and has no concerns about managing.   Will follow in hospital to maximize mobility and independence  Treatment Diagnosis: impaired functional mobility 2/2 decreased endurance  Decision Making: Low Complexity  Requires PT Follow-Up: Yes     Plan   Plan  Plan:  (2-5)  Current Treatment Recommendations: Functional mobility training,Gait training,Safety education & training,Patient/Caregiver education & training,Endurance training  Safety Devices  Type of Devices: Call light within reach,Chair alarm in place,Nurse notified,Left in chair     Restrictions  Position Activity Restriction  Other position/activity restrictions: up with assist     Subjective   General  Additional Pertinent Hx: Pt is a 63 y.o. male adm 5/30 with hyperkalemia. Presented to ED with SOB. CXR:bilateral interstitial opacities suggesting edema. Pt with recent admission to hospital 5/29 for fatigue, diarrhea, and SOB consequently missing HD sessions with plans for HD but left AMA      PMH:hypertension, hyperlipidemia, diabetes and end-stage renal disease  Referring Practitioner: Luis Antonio Guillaume MD  Referral Date : 05/31/22  Subjective  Subjective: Pt found sitting EOB. Denies pain. Social/Functional History  Social/Functional History  Lives With: Spouse  Type of Home: House  Home Layout: One level  Home Access: Stairs to enter with rails (4 IBAN)  Bathroom Shower/Tub: Tub/Shower unit  Bathroom Toilet: Standard (vanity next to toilet)  Bathroom Equipment: Grab bars in shower,Shower chair  Home Equipment: Cane,Electric scooter,Walker, 4 wheeled  Has the patient had two or more falls in the past year or any fall with injury in the past year?: No  ADL Assistance: [facility]: Independent  Ambulation Assistance: Independent (without AD)  Transfer Assistance: Independent  Active : Yes  Additional Comments: Reports wife has had strokes and pt has to help her sometimes get out of chair and get out of bed.   Vision/Hearing  Vision  Vision: Within Functional Limits  Hearing  Hearing: Within functional limits    Cognition   Orientation  Overall Orientation Status: Within Functional Limits     Objective                 AROM RLE (degrees)  RLE AROM: WFL  AROM LLE (degrees)  LLE AROM : WFL  Strength RLE  Strength RLE: WFL  Strength LLE  Strength LLE: WFL              Transfers  Sit to Stand: Stand by assistance  Stand to sit: Stand by assistance  Ambulation  Device: No Device  Assistance: Stand by assistance  Quality of Gait: fairly steady, no LOB, decreased barbra  Distance: 100'    Treatment included:  transfers, gt, pt education                OutComes Score                                                  AM-PAC Score  AM-PAC Inpatient Mobility Raw Score : 21 (05/31/22 1426)  AM-PAC Inpatient T-Scale Score : 50.25 (05/31/22 1426)  Mobility Inpatient CMS 0-100% Score: 28.97 (05/31/22 1426)  Mobility Inpatient CMS G-Code Modifier : CJ (05/31/22 1426)          Goals  Short Term Goals  Time Frame for Short term goals: By discharge  Short term goal 1: Sit to stand independent  Short term goal 2: Pt will amb >200' with or without AD supervision  Short term goal 3: Pt will up/down 4 steps with rail supervision       Education  Patient Education  Education Given To: Patient  Education Provided: Role of Therapy;Plan of Care  Education Method: Verbal  Education Outcome: Verbalized understanding      Therapy Time   Individual Concurrent Group Co-treatment   Time In 1359         Time Out 1422         Minutes 23               Timed Code Treatment Minutes: 8      Total Treatment Minutes:  [id number], PT

## 2022-05-31 NOTE — CONSULTS
Clinical Pharmacy Progress Note  Medication History     Admit Date: 5/30/2022    Pharmacy consulted to verify home medication list by Dr. Ethan Walters. List of of current medications patient is taking is complete. Home Medication list in EPIC updated to reflect changes noted below. Source of information: Rx fill history (Surescripts) and patient interview    Patient's home pharmacy: [facility]     Changes made to medication list:   Medications removed: (include reason, ex: therapy completed, patient no longer taking, etc.)     Medications added:    Furosemide 40 mg daily (Rx filled as 40 mg BID on 4/19/22; pt states he only takes once daily)   Losartan   Sertraline   Clopidogrel 75 mg daily  Medication doses adjusted:    Carvedilol 12.5 mg BID - dose per [facility] records and Rx fill history   Sevelamer 800 mg - 3 tab TIDWM  Other notes:        Current Outpatient Medications   Medication Instructions    aspirin 81 mg, Oral, DAILY    atorvastatin (LIPITOR) 40 mg, Oral, DAILY    carvedilol (COREG) 12.5 mg, Oral, 2 TIMES DAILY WITH MEALS    clopidogrel (PLAVIX) 75 mg, Oral, DAILY    DULoxetine (CYMBALTA) 60 mg, Oral, DAILY    furosemide (LASIX) 40 mg, Oral, DAILY    gabapentin (NEURONTIN) 100 mg, Oral, NIGHTLY    insulin glargine (LANTUS) 5 Units, SubCUTAneous, NIGHTLY    insulin lispro (HUMALOG) 100 UNIT/ML injection vial SubCUTAneous, 3 TIMES DAILY BEFORE MEALS, Sliding scale     losartan (COZAAR) 25 mg, Oral, DAILY    NIFEdipine (PROCARDIA XL) 60 mg, Oral, 2 TIMES DAILY    sertraline (ZOLOFT) 50 mg, Oral, DAILY    sevelamer (RENVELA) 800 mg 3 tablets, 3 TIMES DAILY WITH MEALS    SITagliptin (JANUVIA) 25 mg, Oral, DAILY    traZODone (DESYREL) 50 mg, Oral, NIGHTLY    vitamin D (CHOLECALCIFEROL) 1,000 Units, Oral, DAILY       Please call with any questions.   Ronny Yi PharmD, BCPS  Wireless: [id number]   5/31/2022 10:31 AM

## 2022-05-31 NOTE — DISCHARGE SUMMARY
INTERNAL MEDICINE DEPARTMENT AT [address] [place] [address]  DISCHARGE SUMMARY    Patient ID: Radha Del Angel                                             Discharge Date: 5/31/2022   Patient's PCP: No primary care provider on file. Discharge Physician: Sarabjit Westbrook MD   Admit Date: 5/30/2022   Admitting Physician: Katrina Castellano MD    PROBLEMS DURING HOSPITALIZATION  - Volume overload   - Hemodialysis noncompliance    DISCHARGE DIAGNOSES:  Present on Admission:  - Volume overload   - Hemodialysis noncompliance    Hospital Course:  Amelie Medellin y. o. male PMHx ESRD on HD, T2DM, HTN, CAD, AS s/p TAVR pw SOB. Supposed to get HD on TTS but hasnt hjad since last Tuesday. Admitted to hospital 5/29 for fatigue, diarrhea, and SOB consequently missing HD sessions with plans for HD but left AMA. Was going to wait until tomorrow for HD but could not due to SOB so came into ED. Pt has been admitted for similar issues in the past.     On arrival to ED, was hypertensive and hypoxic to 87% on 4L. Placed on BiPAP w/ 60% FiO2 which was slowly titrated down to 5L NC. Has had issues with hemolyzed samples in past and also today but K in 7s with hemolysis and ED went ahead and treated with Ca gluconate, insulin + D50, sodium bicarb, and lokelma d/t prolongation of MS and QT intervals on EKG. Admitted for volume overload and possible hyperkalemia r/t HD noncompliance. HTN urgency, flash pulmonary edema and acute hypoxic respiratory failure 2/2 HD noncompliance  Due to HD noncompliance requiring BiPAP in ED weaned down to 3L NC. BP originally in 210s/100s improved with meds. Nephro consulted. Patient received HD inpatient with improvement in BP and oxygen requirement (to RA).    Hyperkalemia  Related to HD noncompliance. s/p Ca gluc, insulin and D50, sodium bicarb and lokelma. Resolved with HD. On the day of discharge, patient denied any CP, SOB, palpitations, N/V, abdominal pain. Patient status improved and patient was stable on the day of discharge. Counseled patient on importance of compliance with HD. Physical Exam:  BP (!) 180/85   Pulse 75   Temp 97.9 °F (36.6 °C) (Oral)   Resp 18   Ht 5' 9\" (1.753 m)   Wt 147 lb 0.8 oz (66.7 kg)   SpO2 95%   BMI 21.72 kg/m²     ROS - As above    Physical Exam  Constitutional:       General: He is not in acute distress. HENT:      Head: Normocephalic. Mouth/Throat:      Mouth: Mucous membranes are moist.   Cardiovascular:      Rate and Rhythm: Normal rate and regular rhythm. Heart sounds: No murmur heard.       Pulmonary:      Effort: No respiratory distress. Breath sounds: Rales present. No wheezing. Abdominal:      General: There is no distension. Tenderness: There is no abdominal tenderness. There is no guarding. Musculoskeletal:      Left lower leg: No edema. Neurological:      General: No focal deficit present. Mental Status: He is alert and oriented to person, place, and time.       Consults: nephrology  Significant Diagnostic Studies:  chest x-ray  Treatments: dialysis: Hemodialysis  Disposition: home  Discharged Condition: Stable  Follow Up: Primary Care Physician in one week    DISCHARGE MEDICATION:     Medication List      ASK your doctor about these medications    aspirin 81 MG EC tablet     atorvastatin 40 MG tablet  Commonly known as: LIPITOR     carvedilol 25 MG tablet  Commonly known as: COREG     clopidogrel 75 MG tablet  Commonly known as: PLAVIX     DULoxetine 60 MG extended release capsule  Commonly known as: CYMBALTA     furosemide 40 MG tablet  Commonly known as: LASIX     gabapentin 100 MG capsule  Commonly known as: NEURONTIN     insulin glargine 100 UNIT/ML injection vial  Commonly known as: LANTUS     insulin lispro 100 UNIT/ML injection vial  Commonly known as: HUMALOG     losartan 25 MG tablet  Commonly known as: COZAAR     NIFEdipine 60 MG extended release tablet  Commonly known as: PROCARDIA XL     sertraline 50 MG tablet  Commonly known as: ZOLOFT     sevelamer 800 MG tablet  Commonly known as: RENVELA  Take 2 tablets by mouth 3 times daily (with meals)     SITagliptin 25 MG tablet  Commonly known as: JANUVIA     traZODone 50 MG tablet  Commonly known as: DESYREL     vitamin D 25 MCG (1000 UT) Tabs tablet  Commonly known as: CHOLECALCIFEROL          Activity: activity as tolerated  Diet: renal diet  Wound Care: none needed    Time Spent on discharge is more than 45 minutes    Signed:  Dajuan Truong MD, PGY-1  5/31/2022

## 2022-05-31 NOTE — ED PROVIDER NOTES
[address] [facility]          PHYSICIAN ASSISTANT NOTE       Date of evaluation: 5/30/2022    Chief Complaint     Shortness of Breath      History of Present Illness     Rin Bush is a 63 y.o. male, with a history of hypertension, hyperlipidemia, diabetes and end-stage renal disease for which he gets dialysis on Tuesday, Thursday and Saturday. His last dialysis was 1 week ago. The patient was recently admitted to the hospital 2 days ago for diarrhea, nausea, fatigue, decreased intake and shortness of breath. He was hyperkalemic with EKG changes, unfortunately a nonhemolyzed specimen was not obtained during that admission. Dialysis was planned however he left AMA prior to getting this yesterday. He presents to the ED again this evening with complaints of shortness of breath that is worse when he is lying flat. States he was unable to wait until tomorrow to get treatment. He states his nausea and diarrhea have resolved. He has had no chest pain. No fever, cough or other URI type symptoms. He otherwise has no complaints. Review of Systems     Review of Systems   Constitutional: Negative for chills and fever. HENT: Negative for congestion, rhinorrhea and sore throat. Respiratory: Positive for shortness of breath. Negative for cough. Cardiovascular: Negative for chest pain, palpitations and leg swelling. Gastrointestinal: Negative for abdominal pain, nausea and vomiting. Musculoskeletal: Negative for arthralgias and myalgias. Skin: Negative for color change and rash. Neurological: Negative for dizziness, light-headedness and headaches. All other systems reviewed and are negative. Past Medical, Surgical, Family, and Social History     He has a past medical history of Chronic kidney disease, Diabetes mellitus ([address] Hyperlipidemia, and Hypertension. He has a past surgical history that includes Cardiac surgery.   His family history is not on file.  He reports that he has been smoking. He has been smoking about 0.50 packs per day. He has never used smokeless tobacco. He reports previous alcohol use. He reports current drug use. Drug: Marijuana Bettie Elian). Medications     Previous Medications    ASPIRIN 81 MG EC TABLET    Take 81 mg by mouth daily    ATORVASTATIN (LIPITOR) 40 MG TABLET    Take 40 mg by mouth daily     CARVEDILOL (COREG) 25 MG TABLET    Take 25 mg by mouth 2 times daily (with meals)     DULOXETINE (CYMBALTA) 60 MG EXTENDED RELEASE CAPSULE    Take 60 mg by mouth daily    GABAPENTIN (NEURONTIN) 100 MG CAPSULE    Take 100 mg by mouth nightly. INSULIN GLARGINE (LANTUS) 100 UNIT/ML INJECTION VIAL    Inject 5 Units into the skin nightly     INSULIN LISPRO (HUMALOG) 100 UNIT/ML INJECTION VIAL    Inject into the skin 3 times daily (before meals) Sliding scale    NIFEDIPINE (PROCARDIA XL) 60 MG EXTENDED RELEASE TABLET    Take 60 mg by mouth 2 times daily    SEVELAMER (RENVELA) 800 MG TABLET    Take 2 tablets by mouth 3 times daily (with meals)    SITAGLIPTIN (JANUVIA) 25 MG TABLET    Take 25 mg by mouth daily    TRAZODONE (DESYREL) 50 MG TABLET    Take 50 mg by mouth nightly    VITAMIN D (CHOLECALCIFEROL) 25 MCG (1000 UT) TABS TABLET    Take 1,000 Units by mouth daily       Allergies     He is allergic to naproxen. Physical Exam     INITIAL VITALS: BP: (!) 196/86, Temp: 97.8 °F (36.6 °C), Heart Rate: 83, Resp: 22, SpO2: (!) 87 %  Physical Exam  Vitals reviewed. Constitutional:       General: He is in acute distress (moderate respiratory distress). Appearance: He is well-developed and normal weight. HENT:      Head: Normocephalic and atraumatic. Mouth/Throat:      Mouth: Mucous membranes are moist.   Eyes:      Extraocular Movements: Extraocular movements intact. Conjunctiva/sclera: Conjunctivae normal.   Neck:      Trachea: Phonation normal.   Cardiovascular:      Rate and Rhythm: Regular rhythm. Tachycardia present. Heart sounds: No murmur heard. No friction rub. No gallop. Pulmonary:      Effort: Respiratory distress present. Breath sounds: Rales present. No wheezing or rhonchi. Abdominal:      General: There is no distension. Palpations: Abdomen is soft. Tenderness: There is no abdominal tenderness. Musculoskeletal:      Cervical back: Normal range of motion and neck supple. Right lower leg: No edema. Left lower leg: No edema. Skin:     General: Skin is warm and dry. Findings: No petechiae or rash. Neurological:      Mental Status: He is alert and oriented to person, place, and time. Psychiatric:         Mood and Affect: Affect normal. Mood is anxious. Behavior: Behavior normal.         Diagnostic Results     EKG   Interpreted in conjunction with emergency department physician Lisa Rodriguez, *  Rhythm: normal sinus   Rate: normal  Axis: normal  Ectopy: none  Conduction: 1st degree AV block  ST Segments: nonspecific changes  T Waves: inversion in  I, aVr and aVl  Q Waves:none  Clinical Impression: prolonged QT interval  Comparison:  5/28/2022    RECENT VITALS:  BP: (!) 151/71, Temp: 97.8 °F (36.6 °C), Heart Rate: 79, Resp: 17, SpO2: 95 %     ED Course     Nursing Notes, Past Medical Hx,Past Surgical Hx, Social Hx, Allergies, and Family Hx were reviewed. The patient was given the following medications:  Orders Placed This Encounter   Medications    calcium gluconate 10 % injection 1,000 mg    AND Linked Order Group     insulin regular (HUMULIN R;NOVOLIN R) injection 10 Units     dextrose bolus 10% 250 mL    glucose chewable tablet 16 g    OR Linked Order Group     dextrose bolus 10% 125 mL     dextrose bolus 10% 250 mL    glucagon (rDNA) injection 1 mg    dextrose 5 % solution    albuterol (PROVENTIL) nebulizer solution 10 mg     Order Specific Question:   Initiate RT Bronchodilator Protocol     Answer:    Yes    sodium bicarbonate 8.4 % injection 50 mEq  sodium zirconium cyclosilicate (LOKELMA) oral suspension 10 g    nitroGLYCERIN (NITROSTAT) SL tablet 0.4 mg    ondansetron (ZOFRAN) injection 4 mg       CONSULTS:  IP CONSULT TO NEPHROLOGY  IP CONSULT TO HOSPITALIST    MEDICAL DECISION MAKING / ASSESSMENT / Yovanacayden Kenny is a 63 y.o. male with a history of end-stage renal disease, has not had dialysis for approximately 1 week and presents for shortness of breath. Arrives via squad with a pulse ox of 87% on 4 L. Patient was placed on a nonrebreather then switched to BiPAP after which he started feeling more comfortable. He was given sublingual nitroglycerin. Chest x-ray shows similar appearance of bilateral interstitial opacities suggesting edema compared to his x-ray from 2 days ago. Labs include an unremarkable CBC. Renal panel results a potassium of 7.3 with evidence of hemolysis, bicarb 18, BUN 22, creatinine 14.3, anion gap 28, it is otherwise unremarkable. Repeat potassium was 8, still hemolyzed. Troponin is 0.14 and 0.11. BNP is greater than 70,000. Coags are unremarkable. VBG results a pH of 7.338, PCO2 39.2, PO2 45.5, it is otherwise unremarkable. Phosphorus is 11.9. The patient's blood pressure improved. I did speak with Dr. Ines Dumont, for nephrology, who will arrange dialysis for the patient this evening. The hospitalist requested the patient be removed from BiPAP and reassessed to determine appropriate floor. The patient was transitioned to 5 L nasal cannula, pulse ox remained at 95%, vital signs are otherwise stable. The patient will be admitted to stepdown for medical management while awaiting dialysis. He was given treatment for hyperkalemia. He is in stable condition upon waiting transport to the floor. This patient was also evaluated by the attending physician. All care plans were discussed and agreed upon. Clinical Impression     1. Acute pulmonary edema (HCC)    2. ESRD on dialysis ([address]    3.  Hyperkalemia Disposition     DISPOSITION  Admitted     [place], [place]  05/31/22 0006

## 2022-05-31 NOTE — FLOWSHEET NOTE
05/31/22 1128 05/31/22 1330   Vital Signs   BP (!) 180/82 (!) 167/84   Temp 98.1 °F (36.7 °C) 97.8 °F (36.6 °C)   Heart Rate 70 68   Resp 18 16   SpO2 95 % 95 %   Weight 145 lb 15.1 oz (66.2 kg) 142 lb 13.7 oz (64.8 kg)   Weight Method Bed scale Bed scale     Treatment time: 2hr  Net UF: 3L    Pre weight: 66.2kg  Post weight: 64.8kg  EDW:     Access used: LAF  Access function: Good    Medications or blood products given: none    Regular outpatient schedule: MARIBEL PEÑALOZA    Summary of response to treatment: Good     Copy of dialysis treatment record placed in chart, to be scanned into EMR.

## 2022-05-31 NOTE — FLOWSHEET NOTE
Complaints of SOB since lastnight approximately 10-11pm. Does not wear oxygen at baseline, 88% RA. Recently admitted Saturday missed dialysis signed out AMA. Next dialysis to be tomorrow a.m.  Receives dialysis TTS

## 2022-05-31 NOTE — PROGRESS NOTES
Pt arrived to [facility]308 from dialysis via stretcher. Pt transferred from stretcher to bed via sliding with sheet. VS taken, assessment complete, height and weight obtained, pt oriented to room and educated on call light. 4 eye skin assessment complete and pt denies questions at this time.

## 2022-05-31 NOTE — PROGRESS NOTES
Patient was 95% on NRB with a respiratory rate of 36 bpm.    Placed Bipap via Trilogy. Medium sized full mask.     Settings:  IPAP 16  EPAP 8  FIO2 60%  RR 18    SpO2 100%

## 2022-05-31 NOTE — H&P
Internal Medicine  PGY 1  History & Physical      CC SOB    History Obtained From:  patient    HISTORY OF PRESENT ILLNESS:  Shawn Villatoro a 63 y.o. male PMHx ESRD on HD, T2DM, HTN, CAD, AS s/p TAVR pw SOB. Supposed to get HD on TTS but hasnt hjad since last Tuesday. Admitted to hospital 5/29 for fatigue, diarrhea, and SOB consequently missing HD sessions with plans for HD but left AMA. Was going to wait until tomorrow for HD but could not due to SOB so came into ED. Pt has been admitted for similar issues in the past.    On arrival to ED, was hypertensive and hypoxic to 87% on 4L. Placed on BiPAP w/ 60% FiO2 which was slowly titrated down to 5L NC. Has had issues with hemolyzed samples in past and also today but K in 7s with hemolysis and ED went ahead and treated with Ca gluconate, insulin + D50, sodium bicarb, and lokelma d/t prolongation of NY and QT intervals on EKG. Admitted for volume overload and possible hyperkalemia r/t HD noncompliance. Past Medical History:        Diagnosis Date    Chronic kidney disease     Diabetes mellitus ([facility] [address]     Hyperlipidemia     Hypertension        Past Surgical History:        Procedure Laterality Date    CARDIAC SURGERY      Heart valve replacement        Medications Priorto Admission:    Not in a hospital admission. Allergies:  Naproxen    Social History:   · TOBACCO:   reports that he has been smoking. He has been smoking about 0.50 packs per day. He has never used smokeless tobacco.  · ETOH:   reports previous alcohol use. · DRUGS : none  · Patient currently lives at home  ·   Family History:   History reviewed. No pertinent family history. Review of Systems   All other systems reviewed and are negative. ROS: A 10 point review of systems was conducted, significant findings as noted in HPI. Vitals:    05/31/22 0006   BP:    Pulse: 78   Resp: 16   Temp:    SpO2: 94%     Physical Exam  Constitutional:       Appearance: Normal appearance. Cardiovascular:      Rate and Rhythm: Normal rate and regular rhythm. Pulses: Normal pulses. Heart sounds: Normal heart sounds. Pulmonary:      Effort: No respiratory distress. Breath sounds: Rales present. No wheezing or rhonchi. Abdominal:      General: Abdomen is flat. Bowel sounds are normal. There is no distension. Palpations: Abdomen is soft. Tenderness: There is no abdominal tenderness. Musculoskeletal:      Comments: Trace bilateral LE edema   Neurological:      General: No focal deficit present. Mental Status: He is alert and oriented to person, place, and time. DATA:    Labs:  CBC:   Recent Labs     05/28/22 1953 05/30/22 2100   WBC 10.2 8.3   HGB 12.6* 11.3*   HCT 38.5* 34.5*    361       BMP:   Recent Labs     05/28/22 1953 05/28/22 2224 05/29/22 0605 05/29/22 0605 05/30/22 2059 05/30/22 2152 05/30/22 2237 05/30/22  2313     --  140  --  138  --   --   --    K 7.2*   < > 6.5*  --  7.3* 8.0*  --   --    CL 90*  --  92*  --  92*  --   --   --    CO2 22  --  17*  --  18*  --   --   --    BUN 67*  --  72*  --  102*  --   --   --    CREATININE 11.9*  --  12.9*  --  14.3*  --   --   --    GLUCOSE 119*   < > 95   < > 96  --  205 201   PHOS  --   --   --   --  11.9*  --   --   --     < > = values in this interval not displayed. LFT's:   Recent Labs     05/28/22 1953   AST 25   ALT 7*   BILITOT 0.3   ALKPHOS 74     Troponin:   Recent Labs     05/29/22 1152 05/30/22 2059 05/30/22 2249   TROPONINI 0.13* 0.14* 0.11*     BNP:No results for input(s): BNP in the last 72 hours. ABGs: No results for input(s): PHART, FXY8FAQ, PO2ART in the last 72 hours.   INR:   Recent Labs     05/28/22 1953 05/30/22 2100   INR 1.03 1.06       U/A:No results for input(s): NITRITE, COLORU, PHUR, LABCAST, WBCUA, RBCUA, MUCUS, TRICHOMONAS, YEAST, BACTERIA, CLARITYU, SPECGRAV, LEUKOCYTESUR, UROBILINOGEN, BILIRUBINUR, BLOODU, GLUCOSEU, AMORPHOUS in the last 72 hours.    Invalid input(s): KETONESU    XR CHEST (2 VW)   Final Result      Similar appearance of bilateral interstitial opacities suggesting edema. EKst deg AV block, NSR  Echo (2022): - Left ventricle: The cavity size is normal. Wall thickness was increased in a pattern of mild LVH.     Systolic function was normal. The estimated ejection fraction was in the range of 55% to 60%. Wall     motion was normal; there were no regional wall motion abnormalities. The study is not technically     sufficient to allow evaluation of LV diastolic function.   - Right ventricle: The cavity size is normal. Systolic function was normal by visual assessment. - Aortic valve: There is a bioprosthesis. Transvalvular velocity is within the expected range.     Trivial regurgitation. The mean systolic gradient is 4mm Hg. The valve area by the velocity-time     integral method is 2.0cm^2. - Mitral valve: Calcified annulus. Mildly thickened leaflets. The findings are consistent with mild     stenosis. Mild regurgitation. The mean diastolic gradient is 6mm Hg. - Left atrium: The atrium is mildly dilated. - Pulmonary arteries: Systolic pressure could not be accurately estimated. - Inferior vena cava: The vessel was normal in size. ASSESSMENT AND PLAN:    HTN emergency, flash pulmonary edema 2/2 volume overload: due to HD noncompliance requiring BiPAP in ED weaned down to NC. BP originally in 210s/100s improved with improved resp status  - nephro following  - HD  - wean supp O2 as tolerated  - daily weights  - home coreg, nifedipine  - home renvela, vit D  - PT/OT    Hyperkalemia: also related to HD noncompliance.  Has difficult draws with often hemolyzed labs  - s/p Ca gluc, ins + D50, sodium bicarb, lokelma  - HD   - monitor daily    Chronic Health Conditions  AS s/p TAVR, CAD s/p PCI: ASA, plavix  Mood disorder - cymbalta  T2DM - lantus 5 + MDSS, gabapentin       Will discuss with attending physician  Tanika Meyer.    Code Status: Prior  FEN: No diet orders on file  PPX: SQH  DISPO: IP    Anum Florian MD  5/31/2022,  12:38 AM

## 2022-05-31 NOTE — PROGRESS NOTES
4 Eyes Admission Assessment     I agree as the admission nurse that 2 RN's have performed a thorough Head to Toe Skin Assessment on the patient. ALL assessment sites listed below have been assessed on admission. Areas assessed by both nurses: Amira Mathews  [x]   Head, Face, and Ears   [x]   Shoulders, Back, and Chest  [x]   Arms, Elbows, and Hands   [x]   Coccyx, Sacrum, and Ischium  [x]   Legs, Feet, and Heels    Cyst L middle toe, amputated L pinky toe     Does the Patient have Skin Breakdown?   No        Cezar Prevention initiated:  No   Wound Care Orders initiated:  No      [facility] nurse consulted for Pressure Injury (Stage 3,4, Unstageable, DTI, NWPT, and Complex wounds) or Cezar score 18 or lower:  No      Nurse 1 eSignature: Electronically signed by Ammon Garza RN on 5/31/22 at 4:45 AM EDT    **SHARE this note so that the co-signing nurse is able to place an eSignature**    Nurse 2 eSignature: Electronically signed by Hodan Smith RN on 5/31/22 at 4:46 AM EDT

## 2022-05-31 NOTE — PROGRESS NOTES
Nephrology consult  Note                                                                                                                                                                                                                                                                                                     Office : [phone]     Fax :[phone]              Patient's Name: Radha Del Angel  11:57 AM  5/31/2022    HPI:    Radha Del Angel is a 63 y.o. male with PMHx of HTN, DM2, AS s/p TAVR, ESRD who was admitted on 5/29/2022 with complaints of fatigue and shortness of breath. Patient left AMA but shortly returned afterwards to ED due to SOB. In the ED, patient was hypertensive, hypoxic 87% on 4L. He was placed on BiPAP, which was slowly titrated down to 5L NC. Potassium was high (7s) and the ED gave calcium gluconate, insulin and D50, sodium bicarbonate and lokelma. He was admitted for volume overload and hyperkalemia. Interval History:  Patient had an short dialysis this morning around 4:30am. Patient was seen at bedside today. He reports feeling much better than the day prior. His K+ has come down to 4.8 from 8.0. Plan is to undergo another dialysis today. Patient denies any chest pain, abdominal pain, nausea, vomiting, shortness of breath, fevers or chills. Pt seen on HD kitty well     Past Medical History:   Diagnosis Date    Chronic kidney disease     Diabetes mellitus ([facility] [address]     Hyperlipidemia     Hypertension        Past Surgical History:   Procedure Laterality Date    CARDIAC SURGERY      Heart valve replacement        History reviewed. No pertinent family history. reports that he has been smoking. He has been smoking about 0.50 packs per day. He has never used smokeless tobacco. He reports previous alcohol use. He reports current drug use. Drug: Marijuana Berneta David).       Allergies:  Naproxen    Current Medications:    aspirin EC tablet 81 mg, Daily  atorvastatin (LIPITOR) tablet 40 mg, Daily  carvedilol (COREG) tablet 25 mg, BID WC  clopidogrel (PLAVIX) tablet 75 mg, Daily  DULoxetine (CYMBALTA) extended release capsule 60 mg, Daily  gabapentin (NEURONTIN) capsule 100 mg, Nightly  insulin glargine (LANTUS;BASAGLAR) injection pen 5 Units, Nightly  insulin lispro (1 Unit Dial) 0-12 Units, TID WC  insulin lispro (1 Unit Dial) 0-6 Units, Nightly  sevelamer (RENVELA) tablet 2,400 mg, TID WC  traZODone (DESYREL) tablet 50 mg, Nightly PRN  vitamin D (CHOLECALCIFEROL) tablet 1,000 Units, Daily  NIFEdipine (PROCARDIA XL) extended release tablet 60 mg, BID  sodium chloride flush 0.9 % injection 5-40 mL, 2 times per day  sodium chloride flush 0.9 % injection 5-40 mL, PRN  0.9 % sodium chloride infusion, PRN  heparin (porcine) injection 5,000 Units, TID  ondansetron (ZOFRAN-ODT) disintegrating tablet 4 mg, Q8H PRN   Or  ondansetron (ZOFRAN) injection 4 mg, Q6H PRN  polyethylene glycol (GLYCOLAX) packet 17 g, Daily PRN  acetaminophen (TYLENOL) tablet 650 mg, Q6H PRN   Or  acetaminophen (TYLENOL) suppository 650 mg, Q6H PRN  hydrALAZINE (APRESOLINE) injection 10 mg, Q6H PRN  glucose chewable tablet 16 g, PRN  dextrose bolus 10% 125 mL, PRN   Or  dextrose bolus 10% 250 mL, PRN  glucagon (rDNA) injection 1 mg, PRN  dextrose 5 % solution, PRN  nitroGLYCERIN (NITROSTAT) SL tablet 0.4 mg, Q5 Min PRN          Review of Systems:   14 point ROS obtained but were negative except mentioned in HPI      Vitals:  BP (!) 180/85   Pulse 70   Temp 98.1 °F (36.7 °C)   Resp 18   Ht 5' 9\" (1.753 m)   Wt 145 lb 15.1 oz (66.2 kg)   SpO2 95%   BMI 21.55 kg/m²     Physical Exam:   Constitutional:  OAAx3 NAD  HEENT:  EOMI, PERRL  Neck: No JVD or bruits  Cardiovascular: regular rate/rhythm  Respiratory: CTA, mild rales on left lower lung  Abdomen:  +bowel sounds, soft, NT, non-distended  Ext/Musculoskeletal: No LE edema  Skin: no rash, normal skin turgor   Neurological:  No focal neurological deficits  Psychiatric: Mood and affect appropriate      Labs:  CBC:   Recent Labs     05/28/22 1953 05/30/22 2100 05/31/22 0449   WBC 10.2 8.3 7.6   HGB 12.6* 11.3* 11.0*    361 343     BMP:    Recent Labs     05/28/22 1953 05/29/22 0605 05/30/22 2059 05/30/22 2059 05/30/22 2152 05/30/22 2237 05/30/22 2313 05/31/22 0449   NA  --  140 138  --   --   --   --  136   K   < > 6.5* 7.3*  --  8.0*  --   --  4.8   CL  --  92* 92*  --   --   --   --  91*   CO2  --  17* 18*  --   --   --   --  21   BUN  --  72* 102*  --   --   --   --  58*   CREATININE  --  12.9* 14.3*  --   --   --   --  9.7*   GLUCOSE   < > 95 96   < >  --  205 201 99    < > = values in this interval not displayed. Ca/Mg/Phos:   Recent Labs     05/29/22 0605 05/30/22 2059 05/31/22 0449   CALCIUM 8.9 8.7 9.0   MG  --   --  2.50*   PHOS  --  11.9* 7.1*     Hepatic:   Recent Labs     05/28/22 1953   AST 25   ALT 7*   BILITOT 0.3   ALKPHOS 74     Troponin:   Recent Labs     05/29/22 1152 05/30/22 2059 05/30/22 2249   TROPONINI 0.13* 0.14* 0.11*     BNP: No results for input(s): BNP in the last 72 hours. Lipids: No results for input(s): CHOL, TRIG, HDL, LDLCALC, LABVLDL in the last 72 hours. ABGs: No results for input(s): PHART, PO2ART, DBT5ZBG in the last 72 hours. INR:   Recent Labs     05/28/22 1953 05/30/22 2100   INR 1.03 1.06     UA:No results for input(s): Anjel Lopez, GLUCOSEU, BILIRUBINUR, Chloe Mcclure, BLOODU, PHUR, PROTEINU, UROBILINOGEN, NITRU, LEUKOCYTESUR, Radha Comment in the last 72 hours. Urine Microscopic: No results for input(s): LABCAST, BACTERIA, COMU, HYALCAST, WBCUA, RBCUA, EPIU in the last 72 hours. Urine Culture: No results for input(s): LABURIN in the last 72 hours. Urine Chemistry: No results for input(s): Jerald Clos, PROTEINUR, NAUR in the last 72 hours. IMAGING:  XR CHEST (2 VW)   Final Result      Similar appearance of bilateral interstitial opacities suggesting edema. Assessment       1. ESRD    2. HTN    3. Anemia    4. Acid-base/electrolyte imbalance       Plan  - hemodialysis today  - UF as kitty   - Anemia management   - MBD management   - continue home coreg, nifedipine  - continue home renvala, vit D  - daily weights and I&Os      Thank you for allowing us to participate in care of Yvette Stephens. We will continue to follow.       [facility] MS4 - Acting as a scribe    Mayo Lang MD      Feel free to contact me   [facility] [address]  Office:  [phone]  Fax:  [phone]

## 2022-05-31 NOTE — ED NOTES
RT called at this time to help attempt wean patient off bipap     Christopher Barkley RN  05/30/22 2823

## 2022-05-31 NOTE — FLOWSHEET NOTE
Treatment time: 2 hrs    Net UF: 3.6 liters    Pre weight: 70.1 kg  Post weight: 66.7 kg     Access used: LFA AVF  Access function: no issues noted.  per orders. Medications or blood products given: None    Regular outpatient schedule: [facility]    Summary of response to treatment: Pt tolerated treatment. No issues noted.      Copy of dialysis treatment record placed in chart, to be scanned into EMR.         05/31/22 0150 05/31/22 0400   Treatment   Time On 0150  --    Time Off  --  0350   Vital Signs   BP (!) 148/100 (!) 175/93   Heart Rate 77 76   Resp 17 17   Weight 154 lb 8.7 oz (70.1 kg) 147 lb 0.8 oz (66.7 kg)   Weight Method Actual;Standing scale Actual;Standing scale   Post-Hemodialysis Assessment   Total Liters Processed (l/min)  --  39.8 l/min   Duration of Treatment (minutes)  --  120 minutes   NET Removed (ml)  --  3600   Dialysis Bath   K+ (Potassium) 2  --    Ca+ (Calcium) 2.5  --    Na+ (Sodium) 134  --    HCO3 (Bicarb) 32  -- Statement Selected

## 2022-05-31 NOTE — PLAN OF CARE
Problem: Discharge Planning  Goal: Discharge to home or other facility with appropriate resources  Outcome: Progressing     Problem: Pain  Goal: Verbalizes/displays adequate comfort level or baseline comfort level  Outcome: Progressing     Problem: ABCDS Injury Assessment  Goal: Absence of physical injury  5/31/2022 0957 by Bhupendra Paiz RN  Outcome: Progressing  Flowsheets (Taken 5/31/2022 0830)  Absence of Physical Injury: Implement safety measures based on patient assessment  5/31/2022 0618 by Major Lars  Outcome: Progressing  Flowsheets (Taken 5/31/2022 0618)  Absence of Physical Injury: Implement safety measures based on patient assessment     Problem: Cardiovascular - Adult  Goal: Maintains optimal cardiac output and hemodynamic stability  5/31/2022 0957 by Bhupendra Paiz RN  Outcome: Progressing  Flowsheets (Taken 5/31/2022 0830)  Maintains optimal cardiac output and hemodynamic stability: Monitor blood pressure and heart rate  5/31/2022 0618 by Dionisio Moreno  Outcome: Progressing  Flowsheets (Taken 5/31/2022 0618)  Maintains optimal cardiac output and hemodynamic stability:   Monitor blood pressure and heart rate   Assess for signs of decreased cardiac output     Problem: Respiratory - Adult  Goal: Achieves optimal ventilation and oxygenation  5/31/2022 0957 by Bhupendra Paiz RN  Outcome: Progressing  Flowsheets (Taken 5/31/2022 0830)  Achieves optimal ventilation and oxygenation: Assess for changes in respiratory status  5/31/2022 0618 by Dionisio Moreno  Outcome: Progressing  Flowsheets (Taken 5/31/2022 8346)  Achieves optimal ventilation and oxygenation:   Assess for changes in respiratory status   Assess and instruct to report shortness of breath or any respiratory difficulty   Oxygen supplementation based on oxygen saturation or arterial blood gases     Problem: Metabolic/Fluid and Electrolytes - Adult  Goal: Electrolytes maintained within normal limits  5/31/2022 0957 by Bhupendra Paiz RN  Outcome: Progressing  Flowsheets (Taken 5/31/2022 0830)  Electrolytes maintained within normal limits: Monitor labs and assess patient for signs and symptoms of electrolyte imbalances  5/31/2022 0618 by Madison Jackson  Outcome: Progressing  Flowsheets (Taken 5/31/2022 8305)  Electrolytes maintained within normal limits:   Monitor labs and assess patient for signs and symptoms of electrolyte imbalances   Administer electrolyte replacement as ordered  Goal: Glucose maintained within prescribed range  5/31/2022 0957 by Deo Simpson RN  Outcome: Progressing  Flowsheets (Taken 5/31/2022 0830)  Glucose maintained within prescribed range: Monitor blood glucose as ordered  5/31/2022 0618 by Madison Jackson  Outcome: Progressing  Flowsheets (Taken 5/31/2022 0618)  Glucose maintained within prescribed range:   Monitor blood glucose as ordered   Administer ordered medications to maintain glucose within target range     Problem: Chronic Conditions and Co-morbidities  Goal: Patient's chronic conditions and co-morbidity symptoms are monitored and maintained or improved  Outcome: Progressing

## 2022-05-31 NOTE — PLAN OF CARE
Problem: ABCDS Injury Assessment  Goal: Absence of physical injury  Outcome: Progressing  Flowsheets (Taken 5/31/2022 0618)  Absence of Physical Injury: Implement safety measures based on patient assessment     Problem: Cardiovascular - Adult  Goal: Maintains optimal cardiac output and hemodynamic stability  Outcome: Progressing  Flowsheets (Taken 5/31/2022 0618)  Maintains optimal cardiac output and hemodynamic stability:   Monitor blood pressure and heart rate   Assess for signs of decreased cardiac output     Problem: Respiratory - Adult  Goal: Achieves optimal ventilation and oxygenation  Outcome: Progressing  Flowsheets (Taken 5/31/2022 0618)  Achieves optimal ventilation and oxygenation:   Assess for changes in respiratory status   Assess and instruct to report shortness of breath or any respiratory difficulty   Oxygen supplementation based on oxygen saturation or arterial blood gases     Problem: Metabolic/Fluid and Electrolytes - Adult  Goal: Electrolytes maintained within normal limits  Outcome: Progressing  Flowsheets (Taken 5/31/2022 0618)  Electrolytes maintained within normal limits:   Monitor labs and assess patient for signs and symptoms of electrolyte imbalances   Administer electrolyte replacement as ordered  Goal: Glucose maintained within prescribed range  Outcome: Progressing  Flowsheets (Taken 5/31/2022 0618)  Glucose maintained within prescribed range:   Monitor blood glucose as ordered   Administer ordered medications to maintain glucose within target range

## 2022-05-31 NOTE — CONSULTS
Consult received  Spoke to ED provider    Arrange for HD tonight and tomorrow    Full note to follow    Mame Victor MD  5/30/2022    [facility] [address]  Office : [phone]  Fax :[phone]

## 2022-06-01 ENCOUNTER — CARE COORDINATION (OUTPATIENT)
Dept: CASE MANAGEMENT | Age: 62
End: 2022-06-01

## 2022-06-01 NOTE — CARE COORDINATION
K[facility] Transitions Initial Follow Up Call    Call within 2 business days of discharge: Yes    Patient: Travis Steiner Patient : 1960   MRN: <[id number]>  Reason for Admission: pulmonary edema, missed HD, fluid overload  Discharge Date: 22 RARS: Readmission Risk Score: 24.3 ( )      Last Discharge [facility]       Complaint Diagnosis Description Type Department Provider    22 Shortness of Breath Acute pulmonary edema ([address] . .. ED to Hosp-Admission (Discharged) (ADMITTED) [facility] 4 PCU Shi Murphy MD; Jessica Hyde Spoke with: Cece Enriquez 83: [facility]    Non-face-to-face services provided:  Obtained and reviewed discharge summary and/or continuity of care documents  Education of patient/family/caregiver/guardian to support self-management-HD  Assessment and support for treatment adherence and medication management-full medication reconciliation    Care Transitions 24 Hour Call    Care Transitions Interventions         Transitions of Care Initial Call    Was this an external facility discharge? No Discharge Facility: NA    Challenges to be reviewed by the provider   Additional needs identified to be addressed with provider: No  none             Method of communication with provider : none    Advance Care Planning:   Does patient have an Advance Directive: not on file. LPN Care Coordinator contacted the patient by telephone to perform post hospital discharge assessment. Verified name and  with patient as identifiers. Provided introduction to self, and explanation of the LPN CC role. LPN CC reviewed discharge instructions, medical action plan and red flags with patient who verbalized understanding. Patient given an opportunity to ask questions and does not have any further questions or concerns at this time. Were discharge instructions available to patient? Yes.  Reviewed appropriate site of care based on symptoms and resources available to patient including: PCP  Specialist. The patient agrees to contact the PCP office for questions related to their healthcare. Medication reconciliation was performed with patient, who verbalizes understanding of administration of home medications. Advised obtaining a 90-day supply of all daily and as-needed medications. Was patient discharged with a pulse oximeter? no    Patient verified  and was pleasant and agreeable to transition calls. States he is fine. Denies SOB, dizziness, edema, N/V/D. Appetite a little diminished, but improving. Denies problems with bowels. Makes very little urine d/t ESRD. Attends HD TTS. States he transports himself. Full medication reconciliation completed. States his wife may have made his HFU, he is not sure. LPN CC encouraged patient to verify this when he could & ensure he has f/u & attends HD routinely. Denies needs. LPN CC provided contact information. No further follow-up call indicated based on severity of symptoms and risk factors. Follow Up  No future appointments.     Yvette Agee LPN

## 2022-06-13 NOTE — DISCHARGE SUMMARY
Discharge Summary    Date:6/13/2022        Patient Erma Diaz     YOB: 1960     Age:61 y.o. Admit Date:5/28/2022   Admission Condition:fair   Discharged Condition:fair  Discharge Date: 06/13/22    Discharge Diagnoses   Principal Problem:    Hyperkalemia  Active Problems:    Encounter for hemodialysis ([facility] [address]    S/P TAVR (transcatheter aortic valve replacement)    Diarrhea    Type 2 diabetes mellitus, with long-term current use of insulin (HCC)    ESRD (end stage renal disease) ([facility] [address]  Resolved Problems:    * No resolved hospital problems. [facility] Stay   Narrative of Hospital Course:     Hyperkalemia  HD today, discussed with nephrology     esrd  HD per nephrology     Diarrhea suspect viral infection  Improved     DM type 2  Well controlled     S/p TAVR   Stable. No sob       Patient left AMA. Physical exam   Vitals:    05/29/22 0100 05/29/22 0159 05/29/22 0459 05/29/22 0819   BP: (!) 191/92 (!) 187/87 (!) 189/88 126/65   Pulse: 88 89 87 89   Resp:  18 18 18   Temp:  98.4 °F (36.9 °C) 98.3 °F (36.8 °C) 98 °F (36.7 °C)   TempSrc:  Oral Oral Oral   SpO2: 96% 92% 94% 97%   Weight:  159 lb 2.8 oz (72.2 kg)     Height:             Physical Exam  See same day progress note. Consultants:   IP CONSULT TO NEPHROLOGY  IP CONSULT TO NEPHROLOGY  IP CONSULT TO HOSPITALIST  IP CONSULT TO PHARMACY    Time Spent on Discharge:  45 minutes were spent in patient examination, evaluation, counseling as well as medication reconciliation, prescriptions for required medications, discharge plan and follow up. Surgeries/Procedures Performed:            Significant Diagnostic Studies:   Recent Labs:  CBC: No results for input(s): WBC, HGB, HCT, MCV, PLT in the last 72 hours. BMP: No results for input(s): NA, K, CL, CO2, PHOS, BUN, CREATININE, CA in the last 72 hours.   Mag:   Lab Results   Component Value Date    MG 2.50 05/31/2022     LIVER PROFILE: No results for input(s): AST, ALT, LIPASE, BILIDIR, BILITOT, ALKPHOS in the last 72 hours. Invalid input(s): AMYLASE,  ALB  PT/INR: No results for input(s): PROTIME, INR in the last 72 hours. APTT: No results for input(s): APTT in the last 72 hours. BNP:  No results for input(s): BNP in the last 72 hours. CARDIAC ENZYMES: No results for input(s): CKTOTAL, CKMB, TROPONINI in the last 72 hours. Radiology Last 7 Days:  XR CHEST PORTABLE   Final Result      Stable cardiomegaly and mild interstitial edema. Treatment team at time of Discharge:     Discharge Plan   Disposition: AMA    Provider Follow-Up:   No follow-up provider specified. Hospital/Incidental Findings Requiring Follow-Up:  XR CHEST PORTABLE   Final Result      Stable cardiomegaly and mild interstitial edema.                    Discharge Medications         Medication List      ASK your doctor about these medications    aspirin 81 MG EC tablet     atorvastatin 40 MG tablet  Commonly known as: LIPITOR     carvedilol 25 MG tablet  Commonly known as: COREG     DULoxetine 60 MG extended release capsule  Commonly known as: CYMBALTA     gabapentin 100 MG capsule  Commonly known as: NEURONTIN     insulin glargine 100 UNIT/ML injection vial  Commonly known as: LANTUS     insulin lispro 100 UNIT/ML Soln injection vial  Commonly known as: HUMALOG     NIFEdipine 60 MG extended release tablet  Commonly known as: PROCARDIA XL     sevelamer 800 MG tablet  Commonly known as: RENVELA  Take 2 tablets by mouth 3 times daily (with meals)     SITagliptin 25 MG tablet  Commonly known as: JANUVIA     traZODone 50 MG tablet  Commonly known as: DESYREL     vitamin D 25 MCG (1000 UT) Tabs tablet  Commonly known as: CHOLECALCIFEROL            Electronically signed by Norris Sepulveda MD on 6/13/22 at 12:51 PM EDT

## 2022-10-05 ENCOUNTER — APPOINTMENT (OUTPATIENT)
Dept: GENERAL RADIOLOGY | Age: 62
DRG: 640 | End: 2022-10-05
Payer: MEDICARE

## 2022-10-05 ENCOUNTER — HOSPITAL ENCOUNTER (INPATIENT)
Age: 62
LOS: 2 days | Discharge: HOME | DRG: 640 | End: 2022-10-07
Attending: STUDENT IN AN ORGANIZED HEALTH CARE EDUCATION/TRAINING PROGRAM | Admitting: INTERNAL MEDICINE
Payer: MEDICARE

## 2022-10-05 DIAGNOSIS — Z99.2 ESRD NEEDING DIALYSIS (HCC): Primary | ICD-10-CM

## 2022-10-05 DIAGNOSIS — N18.6 ESRD NEEDING DIALYSIS (HCC): Primary | ICD-10-CM

## 2022-10-05 DIAGNOSIS — R11.2 NAUSEA AND VOMITING, UNSPECIFIED VOMITING TYPE: ICD-10-CM

## 2022-10-05 PROBLEM — I12.0 MALIGNANT HYPERTENSION WITH ESRD (END STAGE RENAL DISEASE) (HCC): Status: ACTIVE | Noted: 2022-10-05

## 2022-10-05 LAB
ALBUMIN SERPL-MCNC: 4.9 G/DL (ref 3.4–5)
ALP BLD-CCNC: 92 U/L (ref 40–129)
ALT SERPL-CCNC: 13 U/L (ref 10–40)
ANION GAP SERPL CALCULATED.3IONS-SCNC: 32 MMOL/L (ref 3–16)
AST SERPL-CCNC: 16 U/L (ref 15–37)
BASOPHILS ABSOLUTE: 0.1 K/UL (ref 0–0.2)
BASOPHILS RELATIVE PERCENT: 1.2 %
BILIRUB SERPL-MCNC: 0.5 MG/DL (ref 0–1)
BILIRUBIN DIRECT: <0.2 MG/DL (ref 0–0.3)
BILIRUBIN, INDIRECT: NORMAL MG/DL (ref 0–1)
BUN BLDV-MCNC: 195 MG/DL (ref 7–20)
CALCIUM SERPL-MCNC: 10.1 MG/DL (ref 8.3–10.6)
CHLORIDE BLD-SCNC: 90 MMOL/L (ref 99–110)
CO2: 17 MMOL/L (ref 21–32)
CREAT SERPL-MCNC: 17.4 MG/DL (ref 0.8–1.3)
EKG ATRIAL RATE: 65 BPM
EKG DIAGNOSIS: NORMAL
EKG P AXIS: 56 DEGREES
EKG P-R INTERVAL: 214 MS
EKG Q-T INTERVAL: 484 MS
EKG QRS DURATION: 106 MS
EKG QTC CALCULATION (BAZETT): 503 MS
EKG R AXIS: 55 DEGREES
EKG T AXIS: 96 DEGREES
EKG VENTRICULAR RATE: 65 BPM
EOSINOPHILS ABSOLUTE: 0.2 K/UL (ref 0–0.6)
EOSINOPHILS RELATIVE PERCENT: 2.3 %
GFR AFRICAN AMERICAN: 3
GFR NON-AFRICAN AMERICAN: 3
GLUCOSE BLD-MCNC: 126 MG/DL (ref 70–99)
GLUCOSE BLD-MCNC: 127 MG/DL (ref 70–99)
GLUCOSE BLD-MCNC: 129 MG/DL (ref 70–99)
GLUCOSE BLD-MCNC: 142 MG/DL (ref 70–99)
GLUCOSE BLD-MCNC: 98 MG/DL (ref 70–99)
HCT VFR BLD CALC: 39.2 % (ref 40.5–52.5)
HEMOGLOBIN: 13.2 G/DL (ref 13.5–17.5)
LYMPHOCYTES ABSOLUTE: 1 K/UL (ref 1–5.1)
LYMPHOCYTES RELATIVE PERCENT: 10.2 %
MCH RBC QN AUTO: 32.5 PG (ref 26–34)
MCHC RBC AUTO-ENTMCNC: 33.7 G/DL (ref 31–36)
MCV RBC AUTO: 96.5 FL (ref 80–100)
MONOCYTES ABSOLUTE: 0.6 K/UL (ref 0–1.3)
MONOCYTES RELATIVE PERCENT: 6.2 %
NEUTROPHILS ABSOLUTE: 7.5 K/UL (ref 1.7–7.7)
NEUTROPHILS RELATIVE PERCENT: 80.1 %
PDW BLD-RTO: 15.1 % (ref 12.4–15.4)
PERFORMED ON: ABNORMAL
PERFORMED ON: NORMAL
PLATELET # BLD: 350 K/UL (ref 135–450)
PMV BLD AUTO: 7.9 FL (ref 5–10.5)
POTASSIUM REFLEX MAGNESIUM: 6.5 MMOL/L (ref 3.5–5.1)
POTASSIUM REFLEX MAGNESIUM: 7.1 MMOL/L (ref 3.5–5.1)
PRO-BNP: ABNORMAL PG/ML (ref 0–124)
RBC # BLD: 4.06 M/UL (ref 4.2–5.9)
SODIUM BLD-SCNC: 139 MMOL/L (ref 136–145)
TOTAL PROTEIN: 8.1 G/DL (ref 6.4–8.2)
TROPONIN: 0.16 NG/ML
TROPONIN: 0.17 NG/ML
WBC # BLD: 9.4 K/UL (ref 4–11)

## 2022-10-05 PROCEDURE — 6360000002 HC RX W HCPCS

## 2022-10-05 PROCEDURE — 85025 COMPLETE CBC W/AUTO DIFF WBC: CPT

## 2022-10-05 PROCEDURE — 99285 EMERGENCY DEPT VISIT HI MDM: CPT

## 2022-10-05 PROCEDURE — 80076 HEPATIC FUNCTION PANEL: CPT

## 2022-10-05 PROCEDURE — 84484 ASSAY OF TROPONIN QUANT: CPT

## 2022-10-05 PROCEDURE — 96375 TX/PRO/DX INJ NEW DRUG ADDON: CPT

## 2022-10-05 PROCEDURE — 83036 HEMOGLOBIN GLYCOSYLATED A1C: CPT

## 2022-10-05 PROCEDURE — G0378 HOSPITAL OBSERVATION PER HR: HCPCS

## 2022-10-05 PROCEDURE — 6360000002 HC RX W HCPCS: Performed by: INTERNAL MEDICINE

## 2022-10-05 PROCEDURE — 80048 BASIC METABOLIC PNL TOTAL CA: CPT

## 2022-10-05 PROCEDURE — 6370000000 HC RX 637 (ALT 250 FOR IP): Performed by: INTERNAL MEDICINE

## 2022-10-05 PROCEDURE — 71046 X-RAY EXAM CHEST 2 VIEWS: CPT

## 2022-10-05 PROCEDURE — 96376 TX/PRO/DX INJ SAME DRUG ADON: CPT

## 2022-10-05 PROCEDURE — 6370000000 HC RX 637 (ALT 250 FOR IP): Performed by: PHYSICIAN ASSISTANT

## 2022-10-05 PROCEDURE — 1200000000 HC SEMI PRIVATE

## 2022-10-05 PROCEDURE — 2580000003 HC RX 258: Performed by: PHYSICIAN ASSISTANT

## 2022-10-05 PROCEDURE — 6360000002 HC RX W HCPCS: Performed by: PHYSICIAN ASSISTANT

## 2022-10-05 PROCEDURE — 5A1D70Z PERFORMANCE OF URINARY FILTRATION, INTERMITTENT, LESS THAN 6 HOURS PER DAY: ICD-10-PCS | Performed by: INTERNAL MEDICINE

## 2022-10-05 PROCEDURE — 83880 ASSAY OF NATRIURETIC PEPTIDE: CPT

## 2022-10-05 PROCEDURE — 93005 ELECTROCARDIOGRAM TRACING: CPT | Performed by: PHYSICIAN ASSISTANT

## 2022-10-05 PROCEDURE — 96374 THER/PROPH/DIAG INJ IV PUSH: CPT

## 2022-10-05 PROCEDURE — 96372 THER/PROPH/DIAG INJ SC/IM: CPT

## 2022-10-05 PROCEDURE — 90935 HEMODIALYSIS ONE EVALUATION: CPT

## 2022-10-05 PROCEDURE — 84132 ASSAY OF SERUM POTASSIUM: CPT

## 2022-10-05 PROCEDURE — 2580000003 HC RX 258: Performed by: INTERNAL MEDICINE

## 2022-10-05 RX ORDER — ONDANSETRON 2 MG/ML
4 INJECTION INTRAMUSCULAR; INTRAVENOUS EVERY 6 HOURS PRN
Status: DISCONTINUED | OUTPATIENT
Start: 2022-10-05 | End: 2022-10-05 | Stop reason: SDUPTHER

## 2022-10-05 RX ORDER — SODIUM CHLORIDE 0.9 % (FLUSH) 0.9 %
5-40 SYRINGE (ML) INJECTION PRN
Status: DISCONTINUED | OUTPATIENT
Start: 2022-10-05 | End: 2022-10-07 | Stop reason: HOSPADM

## 2022-10-05 RX ORDER — ACETAMINOPHEN 325 MG/1
650 TABLET ORAL EVERY 4 HOURS PRN
Status: DISCONTINUED | OUTPATIENT
Start: 2022-10-05 | End: 2022-10-05 | Stop reason: SDUPTHER

## 2022-10-05 RX ORDER — ONDANSETRON 2 MG/ML
4 INJECTION INTRAMUSCULAR; INTRAVENOUS EVERY 6 HOURS PRN
Status: DISCONTINUED | OUTPATIENT
Start: 2022-10-05 | End: 2022-10-05

## 2022-10-05 RX ORDER — LOSARTAN POTASSIUM 25 MG/1
25 TABLET ORAL DAILY
Status: DISCONTINUED | OUTPATIENT
Start: 2022-10-05 | End: 2022-10-05

## 2022-10-05 RX ORDER — INSULIN LISPRO 100 [IU]/ML
0-4 INJECTION, SOLUTION INTRAVENOUS; SUBCUTANEOUS
Status: DISCONTINUED | OUTPATIENT
Start: 2022-10-05 | End: 2022-10-07 | Stop reason: HOSPADM

## 2022-10-05 RX ORDER — SODIUM CHLORIDE 0.9 % (FLUSH) 0.9 %
5-40 SYRINGE (ML) INJECTION EVERY 12 HOURS SCHEDULED
Status: DISCONTINUED | OUTPATIENT
Start: 2022-10-05 | End: 2022-10-07 | Stop reason: HOSPADM

## 2022-10-05 RX ORDER — FUROSEMIDE 40 MG/1
40 TABLET ORAL DAILY
Status: DISCONTINUED | OUTPATIENT
Start: 2022-10-06 | End: 2022-10-07 | Stop reason: HOSPADM

## 2022-10-05 RX ORDER — CALCIUM GLUCONATE 94 MG/ML
1000 INJECTION, SOLUTION INTRAVENOUS ONCE
Status: COMPLETED | OUTPATIENT
Start: 2022-10-05 | End: 2022-10-05

## 2022-10-05 RX ORDER — DEXTROSE MONOHYDRATE 100 MG/ML
INJECTION, SOLUTION INTRAVENOUS CONTINUOUS PRN
Status: DISCONTINUED | OUTPATIENT
Start: 2022-10-05 | End: 2022-10-07 | Stop reason: HOSPADM

## 2022-10-05 RX ORDER — ONDANSETRON 2 MG/ML
4 INJECTION INTRAMUSCULAR; INTRAVENOUS ONCE
Status: COMPLETED | OUTPATIENT
Start: 2022-10-05 | End: 2022-10-05

## 2022-10-05 RX ORDER — CARVEDILOL 12.5 MG/1
12.5 TABLET ORAL 2 TIMES DAILY WITH MEALS
Status: DISCONTINUED | OUTPATIENT
Start: 2022-10-05 | End: 2022-10-07 | Stop reason: HOSPADM

## 2022-10-05 RX ORDER — ASPIRIN 81 MG/1
81 TABLET ORAL DAILY
Status: DISCONTINUED | OUTPATIENT
Start: 2022-10-06 | End: 2022-10-07 | Stop reason: HOSPADM

## 2022-10-05 RX ORDER — ATORVASTATIN CALCIUM 40 MG/1
40 TABLET, FILM COATED ORAL DAILY
Status: DISCONTINUED | OUTPATIENT
Start: 2022-10-06 | End: 2022-10-07 | Stop reason: HOSPADM

## 2022-10-05 RX ORDER — CLOPIDOGREL BISULFATE 75 MG/1
75 TABLET ORAL DAILY
Status: DISCONTINUED | OUTPATIENT
Start: 2022-10-06 | End: 2022-10-07 | Stop reason: HOSPADM

## 2022-10-05 RX ORDER — HEPARIN SODIUM 5000 [USP'U]/ML
5000 INJECTION, SOLUTION INTRAVENOUS; SUBCUTANEOUS EVERY 8 HOURS SCHEDULED
Status: DISCONTINUED | OUTPATIENT
Start: 2022-10-05 | End: 2022-10-07 | Stop reason: HOSPADM

## 2022-10-05 RX ORDER — ACETAMINOPHEN 650 MG/1
650 SUPPOSITORY RECTAL EVERY 6 HOURS PRN
Status: DISCONTINUED | OUTPATIENT
Start: 2022-10-05 | End: 2022-10-07 | Stop reason: HOSPADM

## 2022-10-05 RX ORDER — TRAZODONE HYDROCHLORIDE 50 MG/1
50 TABLET ORAL NIGHTLY
Status: DISCONTINUED | OUTPATIENT
Start: 2022-10-05 | End: 2022-10-07 | Stop reason: HOSPADM

## 2022-10-05 RX ORDER — GABAPENTIN 100 MG/1
100 CAPSULE ORAL NIGHTLY
Status: DISCONTINUED | OUTPATIENT
Start: 2022-10-05 | End: 2022-10-05

## 2022-10-05 RX ORDER — ACETAMINOPHEN 325 MG/1
650 TABLET ORAL EVERY 6 HOURS PRN
Status: DISCONTINUED | OUTPATIENT
Start: 2022-10-05 | End: 2022-10-07 | Stop reason: HOSPADM

## 2022-10-05 RX ORDER — NIFEDIPINE 60 MG/1
60 TABLET, EXTENDED RELEASE ORAL 2 TIMES DAILY
Status: DISCONTINUED | OUTPATIENT
Start: 2022-10-05 | End: 2022-10-05

## 2022-10-05 RX ORDER — SODIUM CHLORIDE 9 MG/ML
INJECTION, SOLUTION INTRAVENOUS PRN
Status: DISCONTINUED | OUTPATIENT
Start: 2022-10-05 | End: 2022-10-07 | Stop reason: HOSPADM

## 2022-10-05 RX ORDER — SEVELAMER CARBONATE 800 MG/1
2400 TABLET, FILM COATED ORAL
Status: DISCONTINUED | OUTPATIENT
Start: 2022-10-05 | End: 2022-10-07 | Stop reason: HOSPADM

## 2022-10-05 RX ORDER — METOCLOPRAMIDE HYDROCHLORIDE 5 MG/ML
5 INJECTION INTRAMUSCULAR; INTRAVENOUS EVERY 6 HOURS
Status: DISCONTINUED | OUTPATIENT
Start: 2022-10-05 | End: 2022-10-07 | Stop reason: HOSPADM

## 2022-10-05 RX ORDER — INSULIN LISPRO 100 [IU]/ML
0-4 INJECTION, SOLUTION INTRAVENOUS; SUBCUTANEOUS NIGHTLY
Status: DISCONTINUED | OUTPATIENT
Start: 2022-10-05 | End: 2022-10-07 | Stop reason: HOSPADM

## 2022-10-05 RX ORDER — ONDANSETRON 4 MG/1
4 TABLET, ORALLY DISINTEGRATING ORAL EVERY 8 HOURS PRN
Status: DISCONTINUED | OUTPATIENT
Start: 2022-10-05 | End: 2022-10-05

## 2022-10-05 RX ORDER — DULOXETIN HYDROCHLORIDE 60 MG/1
60 CAPSULE, DELAYED RELEASE ORAL DAILY
Status: DISCONTINUED | OUTPATIENT
Start: 2022-10-05 | End: 2022-10-05

## 2022-10-05 RX ORDER — METOCLOPRAMIDE HYDROCHLORIDE 5 MG/ML
5 INJECTION INTRAMUSCULAR; INTRAVENOUS EVERY 6 HOURS
Status: DISCONTINUED | OUTPATIENT
Start: 2022-10-05 | End: 2022-10-05 | Stop reason: SDUPTHER

## 2022-10-05 RX ADMIN — DEXTROSE MONOHYDRATE 250 ML: 100 INJECTION, SOLUTION INTRAVENOUS at 13:37

## 2022-10-05 RX ADMIN — ONDANSETRON 4 MG: 2 INJECTION INTRAMUSCULAR; INTRAVENOUS at 17:20

## 2022-10-05 RX ADMIN — METOCLOPRAMIDE HYDROCHLORIDE 5 MG: 5 INJECTION INTRAMUSCULAR; INTRAVENOUS at 18:24

## 2022-10-05 RX ADMIN — SODIUM CHLORIDE, PRESERVATIVE FREE 5 ML: 5 INJECTION INTRAVENOUS at 20:30

## 2022-10-05 RX ADMIN — CALCIUM GLUCONATE 1000 MG: 98 INJECTION, SOLUTION INTRAVENOUS at 13:33

## 2022-10-05 RX ADMIN — METOCLOPRAMIDE HYDROCHLORIDE 5 MG: 5 INJECTION INTRAMUSCULAR; INTRAVENOUS at 22:55

## 2022-10-05 RX ADMIN — INSULIN HUMAN 10 UNITS: 100 INJECTION, SOLUTION PARENTERAL at 13:35

## 2022-10-05 RX ADMIN — HEPARIN SODIUM 5000 UNITS: 5000 INJECTION INTRAVENOUS; SUBCUTANEOUS at 20:34

## 2022-10-05 RX ADMIN — CARVEDILOL 12.5 MG: 12.5 TABLET, FILM COATED ORAL at 20:33

## 2022-10-05 RX ADMIN — ONDANSETRON 4 MG: 2 INJECTION INTRAMUSCULAR; INTRAVENOUS at 12:47

## 2022-10-05 RX ADMIN — TRAZODONE HYDROCHLORIDE 50 MG: 50 TABLET ORAL at 20:34

## 2022-10-05 RX ADMIN — INSULIN GLARGINE 6 UNITS: 100 INJECTION, SOLUTION SUBCUTANEOUS at 20:45

## 2022-10-05 ASSESSMENT — PAIN - FUNCTIONAL ASSESSMENT: PAIN_FUNCTIONAL_ASSESSMENT: NONE - DENIES PAIN

## 2022-10-05 ASSESSMENT — ENCOUNTER SYMPTOMS
CHEST TIGHTNESS: 0
NAUSEA: 1
DIARRHEA: 0
VOMITING: 1
SHORTNESS OF BREATH: 0
CONSTIPATION: 0
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
## 2022-10-05 NOTE — ED NOTES
Dialysis called to have patient transported so that he can get dialysis treatment.      Lucina Link RN  10/05/22 1400
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
## 2022-10-05 NOTE — ED PROVIDER NOTES
[address] ENCOUNTER          PHYSICIAN ASSISTANT NOTE       Date of evaluation: 10/5/2022    Chief Complaint     Dizziness and Nausea      History of Present Illness     Faith Gonzalez is a 63 y.o. male with PMH ESRD on dialysis T/Th/Sat with last session 11 days ago presenting to the emergency department for evaluation of nausea and vomiting for the last 5 days, unable to keep anything down to eat or drink including his home medications. He has not been able to make it to dialysis because he has not had transportation. He feels very weak and unwell, has had no fevers or chills. No associated chest pain or shortness of breath. No abdominal pain or known aggravating or relieving features. This time he took his long-acting insulin was 2 days ago, he stopped taking this because he has not been eating. Does not feel swollen in his extremities, abdomen or chest.  He has had symptoms of presyncope following dialysis over the last month and has been working with his PCP to figure out underlying etiology and further blood pressure management. Currently he has no symptoms of presyncope and no episodes of syncope or falls. Review of Systems     Review of Systems   Constitutional:  Positive for fatigue. Negative for chills, diaphoresis and fever. Respiratory:  Negative for chest tightness and shortness of breath. Cardiovascular:  Negative for chest pain, palpitations and leg swelling. Gastrointestinal:  Positive for nausea and vomiting. Negative for constipation and diarrhea. Genitourinary:  Negative for dysuria and frequency. Musculoskeletal:  Negative for myalgias. Skin:  Negative for rash. Neurological:  Positive for dizziness, weakness and light-headedness. Psychiatric/Behavioral:  Negative for confusion.       Past Medical, Surgical, Family, and Social History     He has a past medical history of Chronic kidney disease, Diabetes mellitus ([facility] [address] Hyperlipidemia, and Hypertension. He has a past surgical history that includes Cardiac surgery. His family history is not on file. He reports that he has been smoking. He has been smoking an average of .5 packs per day. He has never used smokeless tobacco. He reports that he does not currently use alcohol. He reports current drug use. Drug: Marijuana Sabannika Ruffin). Medications     Previous Medications    ASPIRIN 81 MG EC TABLET    Take 81 mg by mouth daily    ATORVASTATIN (LIPITOR) 40 MG TABLET    Take 40 mg by mouth daily     CARVEDILOL (COREG) 25 MG TABLET    Take 12.5 mg by mouth 2 times daily (with meals)     CLOPIDOGREL (PLAVIX) 75 MG TABLET    Take 75 mg by mouth daily    DULOXETINE (CYMBALTA) 60 MG EXTENDED RELEASE CAPSULE    Take 60 mg by mouth daily    FUROSEMIDE (LASIX) 40 MG TABLET    Take 40 mg by mouth daily    GABAPENTIN (NEURONTIN) 100 MG CAPSULE    Take 100 mg by mouth nightly. INSULIN GLARGINE (LANTUS) 100 UNIT/ML INJECTION VIAL    Inject 5 Units into the skin nightly     INSULIN LISPRO (HUMALOG) 100 UNIT/ML INJECTION VIAL    Inject into the skin 3 times daily (before meals) Sliding scale    LOSARTAN (COZAAR) 25 MG TABLET    Take 25 mg by mouth daily    NIFEDIPINE (PROCARDIA XL) 60 MG EXTENDED RELEASE TABLET    Take 60 mg by mouth 2 times daily    SERTRALINE (ZOLOFT) 50 MG TABLET    Take 50 mg by mouth daily    SEVELAMER (RENVELA) 800 MG TABLET    Take 2 tablets by mouth 3 times daily (with meals)    SITAGLIPTIN (JANUVIA) 25 MG TABLET    Take 25 mg by mouth daily    TRAZODONE (DESYREL) 50 MG TABLET    Take 50 mg by mouth nightly    VITAMIN D (CHOLECALCIFEROL) 25 MCG (1000 UT) TABS TABLET    Take 1,000 Units by mouth daily       Allergies     He is allergic to naproxen. Physical Exam     INITIAL VITALS: BP: (!) 197/93, Temp: 97.4 °F (36.3 °C), Heart Rate: 73, Resp: 14, SpO2: 100 %  Physical Exam  Constitutional:       General: He is not in acute distress.      Appearance: He is not toxic-appearing or diaphoretic. Comments: Thin, cachectic and pale appearance   HENT:      Head: Normocephalic and atraumatic. Eyes:      Extraocular Movements: Extraocular movements intact. Pupils: Pupils are equal, round, and reactive to light. Cardiovascular:      Rate and Rhythm: Normal rate and regular rhythm. Pulses: Normal pulses. Heart sounds: Normal heart sounds. No murmur heard. No friction rub. No gallop. Pulmonary:      Effort: Pulmonary effort is normal. No respiratory distress. Breath sounds: Normal breath sounds. Abdominal:      General: Abdomen is flat. There is no distension. Tenderness: no abdominal tenderness There is no guarding or rebound. Hernia: No hernia is present. Musculoskeletal:         General: Normal range of motion. Cervical back: Normal range of motion. Right lower leg: No edema. Left lower leg: No edema. Skin:     General: Skin is warm. Coloration: Skin is pale. Findings: No rash. Neurological:      General: No focal deficit present. Mental Status: He is alert. Psychiatric:         Mood and Affect: Mood normal.         Behavior: Behavior normal.       Diagnostic Results     EKG   Interpreted in conjunction with emergency department physician No att. providers found  Rhythm: normal sinus   Rate: normal  Axis: normal  Ectopy: none  Conduction: 1st degree AV block  ST Segments: normal  T Waves: peaked in  raj-lateral leads and inferior leads  Q Waves:none  Clinical Impression: no acute changes  Comparison:  5/30/22    RADIOLOGY:  XR CHEST (2 VW)   Final Result      Clear lungs. Prominent skinfold overlying the right lateral lung base. Stable cardiomediastinal silhouette status post valve replacement.               LABS:   Results for orders placed or performed during the hospital encounter of 10/05/22   BMP w/ Reflex to MG   Result Value Ref Range    Sodium 139 136 - 145 mmol/L    Potassium reflex Magnesium 6.5 (HH) 3.5 - 5.1 mmol/L    Chloride 90 (L) 99 - 110 mmol/L    CO2 17 (L) 21 - 32 mmol/L    Anion Gap 32 (H) 3 - 16    Glucose 127 (H) 70 - 99 mg/dL     (HH) 7 - 20 mg/dL    Creatinine 17.4 (HH) 0.8 - 1.3 mg/dL    GFR Non-African American 3 (A) >60    GFR  3 (A) >60    Calcium 10.1 8.3 - 10.6 mg/dL   CBC with Auto Differential   Result Value Ref Range    WBC 9.4 4.0 - 11.0 K/uL    RBC 4.06 (L) 4.20 - 5.90 M/uL    Hemoglobin 13.2 (L) 13.5 - 17.5 g/dL    Hematocrit 39.2 (L) 40.5 - 52.5 %    MCV 96.5 80.0 - 100.0 fL    MCH 32.5 26.0 - 34.0 pg    MCHC 33.7 31.0 - 36.0 g/dL    RDW 15.1 12.4 - 15.4 %    Platelets 861 666 - 487 K/uL    MPV 7.9 5.0 - 10.5 fL    Neutrophils % 80.1 %    Lymphocytes % 10.2 %    Monocytes % 6.2 %    Eosinophils % 2.3 %    Basophils % 1.2 %    Neutrophils Absolute 7.5 1.7 - 7.7 K/uL    Lymphocytes Absolute 1.0 1.0 - 5.1 K/uL    Monocytes Absolute 0.6 0.0 - 1.3 K/uL    Eosinophils Absolute 0.2 0.0 - 0.6 K/uL    Basophils Absolute 0.1 0.0 - 0.2 K/uL   Hepatic Function Panel   Result Value Ref Range    Total Protein 8.1 6.4 - 8.2 g/dL    Albumin 4.9 3.4 - 5.0 g/dL    Alkaline Phosphatase 92 40 - 129 U/L    ALT 13 10 - 40 U/L    AST 16 15 - 37 U/L    Total Bilirubin 0.5 0.0 - 1.0 mg/dL    Bilirubin, Direct <0.2 0.0 - 0.3 mg/dL    Bilirubin, Indirect see below 0.0 - 1.0 mg/dL   Troponin   Result Value Ref Range    Troponin 0.17 (H) <0.01 ng/mL   Brain Natriuretic Peptide   Result Value Ref Range    Pro-BNP 61,527 (H) 0 - 124 pg/mL   POCT Glucose   Result Value Ref Range    POC Glucose 129 (H) 70 - 99 mg/dl    Performed on [facility]    EKG 12 Lead   Result Value Ref Range    Ventricular Rate 65 BPM    Atrial Rate 65 BPM    P-R Interval 214 ms    QRS Duration 106 ms    Q-T Interval 484 ms    QTc Calculation (Bazett) 503 ms    P Axis 56 degrees    R Axis 55 degrees    T Axis 96 degrees    Diagnosis       EKG performed in ER and to be interpreted by ER physician. Confirmed by MD, ER ([id number]),  [place] Chuck ([id number]) on 10/5/2022 12:41:12 PM       ED BEDSIDE ULTRASOUND:  No results found. RECENT VITALS:  BP: (!) 190/78, Temp: 97.4 °F (36.3 °C), Heart Rate: 64, Resp: 12, SpO2: 99 %     Procedures   None    ED Course     Nursing Notes, Past Medical Hx,Past Surgical Hx, Social Hx, Allergies, and Family Hx were reviewed. The patient was given the following medications:  Orders Placed This Encounter   Medications    ondansetron (ZOFRAN) injection 4 mg    calcium gluconate 10 % injection 1,000 mg    AND Linked Order Group     insulin regular (HUMULIN R;NOVOLIN R) injection 10 Units     dextrose bolus 10% 250 mL    glucose chewable tablet 16 g    OR Linked Order Group     dextrose bolus 10% 125 mL     dextrose bolus 10% 250 mL    glucagon (rDNA) injection 1 mg    dextrose 10 % infusion       CONSULTS:  IP CONSULT TO NEPHROLOGY  IP CONSULT TO HOSPITALIST    MEDICAL DECISION MAKING / ASSESSMENT / Bill Yolanda is a 63 y.o. male presenting to the emergency department feeling generally unwell, dialysis 11 days ago, reporting nausea and vomiting for the last 5 days. Patient also feels very weak, and appeared unwell although in no acute distress upon presentation. Blood pressure elevated, vitals otherwise stable with no hypoxia or acute respiratory distress. EKG completed concerning for peaked T waves. Potassium returned at 6.5. Given calcium, dextrose and insulin, will continue cardiac monitoring. In addition he is uremic with a BUN of 195. Discussed with Dr. Yahir Thapa of nephrology who will arrange dialysis today. Chest x-ray with no pulmonary edema. Hepatic enzymes normal.  He did have troponin elevation of 0.171, will continue to monitor. No active chest pain, no evidence of STEMI, this is likely troponin leak from kidney disease, but we will continue to monitor.   Plant to admit to the hospital service for dialysis today and for further evaluation of persistent nausea and vomiting and general failure to thrive. This patient was also evaluated by the attending physician. All care plans were discussed and agreed upon. Clinical Impression     1. ESRD needing dialysis ([facility] [address]    2. Nausea and vomiting, unspecified vomiting type        Disposition     PATIENT REFERRED TO:  No follow-up provider specified.     DISCHARGE MEDICATIONS:  New Prescriptions    No medications on file       DISPOSITION Decision To Admit 10/05/2022 01:39:00 PM       Barbra Mulligan PA-C  10/05/22 5118

## 2022-10-05 NOTE — CONSULTS
[address] [place] [address]  GI Consultation      Patient: Hugh Schroeder  : 1960       Date:  10/5/2022    Subjective:       History of Present Illness  Patient is a 63 y.o. male admitted with Malignant hypertension with ESRD (end stage renal disease) ([facility] [I12.0, N18.6]  ESRD needing dialysis ([facility] [N18.6, Z99.2]  Nausea and vomiting, unspecified vomiting type [R11.2] who is seen in consult for nausea and vomiting. Patient is a 63 y.o M with PMHx of HD who presents to the hospital after missing HD for a week and with complaints of inability to tolerate PO with vomiting for about 5 days now. ROS negative for chest pain, SOB or orthopnea. Vitals on arrival, hypertensive to 197/93 in the ED. Labs remarkable for K 7.1, Creatinine 17.4, Pro-BNP 61k, normal LFT's. Past Medical History:   Diagnosis Date    Chronic kidney disease     Diabetes mellitus ([facility]     Hyperlipidemia     Hypertension       Past Surgical History:   Procedure Laterality Date    CARDIAC SURGERY      Heart valve replacement       Past Endoscopic History       Admission Meds  No current facility-administered medications on file prior to encounter.      Current Outpatient Medications on File Prior to Encounter   Medication Sig Dispense Refill    furosemide (LASIX) 40 MG tablet Take 40 mg by mouth daily      losartan (COZAAR) 25 MG tablet Take 25 mg by mouth daily      sertraline (ZOLOFT) 50 MG tablet Take 50 mg by mouth daily      clopidogrel (PLAVIX) 75 MG tablet Take 75 mg by mouth daily      atorvastatin (LIPITOR) 40 MG tablet Take 40 mg by mouth daily       carvedilol (COREG) 25 MG tablet Take 12.5 mg by mouth 2 times daily (with meals)       NIFEdipine (PROCARDIA XL) 60 MG extended release tablet Take 60 mg by mouth 2 times daily      SITagliptin (JANUVIA) 25 MG tablet Take 25 mg by mouth daily      aspirin 81 MG EC tablet Take 81 mg by mouth daily      DULoxetine (CYMBALTA) 60 MG extended release capsule Take 60 mg by mouth daily gabapentin (NEURONTIN) 100 MG capsule Take 100 mg by mouth nightly. traZODone (DESYREL) 50 MG tablet Take 50 mg by mouth nightly      vitamin D (CHOLECALCIFEROL) 25 MCG (1000 UT) TABS tablet Take 1,000 Units by mouth daily      insulin glargine (LANTUS) 100 UNIT/ML injection vial Inject 5 Units into the skin nightly       insulin lispro (HUMALOG) 100 UNIT/ML injection vial Inject into the skin 3 times daily (before meals) Sliding scale      sevelamer (RENVELA) 800 MG tablet Take 2 tablets by mouth 3 times daily (with meals) (Patient taking differently: Take 3 tablets by mouth 3 times daily (with meals) ) 90 tablet 3       Patient on Plavix. Allergies  Allergies   Allergen Reactions    Naproxen Itching     Itching of hands and feet      Social   Social History     Tobacco Use    Smoking status: Every Day     Packs/day: 0.50     Types: Cigarettes    Smokeless tobacco: Never   Substance Use Topics    Alcohol use: Not Currently        History reviewed. No pertinent family history. Unknown family history of colon cancer, Crohn's disease, or ulcerative colitis. Review of Systems  Positive for  nausea, retching. No SOB, chest pain, abdominal pain.          Physical Exam    BP 90/61   Pulse 55   Temp 97.4 °F (36.3 °C)   Resp 14   Ht 5' 8\" (1.727 m)   Wt 136 lb 11 oz (62 kg)   SpO2 99%   BMI 20.78 kg/m²   General appearance: alert, cooperative, no distress, appears stated age  Anicteric, No Jaundice  Head: Normocephalic, without obvious abnormality  Lungs: clear to auscultation bilaterally, Normal Effort  Heart: regular rate and rhythm, normal S1 and S2, no murmurs or rubs  Abdomen: soft, non-tender; bowel sounds normal; no masses,  no organomegaly and    Extremities: atraumatic, no cyanosis or edema  Skin: warm and dry  Neuro: intact  AAOX3      Data Review:    Recent Labs     10/05/22  1221   WBC 9.4   HGB 13.2*   HCT 39.2*   MCV 96.5        Recent Labs     10/05/22  1221 10/05/22  1357   NA 139  --    K 6.5* 7.1*   CL 90*  --    CO2 17*  --    *  --    CREATININE 17.4*  --      Recent Labs     10/05/22  1221   AST 16   ALT 13   BILIDIR <0.2   BILITOT 0.5   ALKPHOS 92     No results for input(s): LIPASE, AMYLASE in the last 72 hours. No results for input(s): PROTIME, INR in the last 72 hours. No results for input(s): PTT in the last 72 hours. No results for input(s): OCCULTBLD in the last 72 hours. Imaging Studies:                                          CT-scan of abdomen and pelvis : 2020      IMPRESSION:     1. No urolithiasis. No mass effect seen on the kidneys, noting limitations of a noncontrast exam.   2.  Apparent diffuse urinary bladder wall thickening could be related to incomplete distention or sequela of cystitis or chronic bladder outlet obstruction the appropriate clinical scenario. 3.  Diverticulosis without evidence of diverticulitis. Assessment:     Principal Problem:    Malignant hypertension with ESRD (end stage renal disease) ([address]  Resolved Problems:    * No resolved hospital problems. *    Nausea and vomiting in patient with ESRD  Electrolyte derangements. Hypertensive urgency. Normal LFTs    Recommendations:     No intervention planned. Would continue symptom management. Resuming diet to clear liquid if patient tolerates. Thank you for the opportunity to participate in Tam Hui Hernandez's care.       Ruddy Duverney, MD  PGY-2   Internal Medicine

## 2022-10-05 NOTE — PROGRESS NOTES
Patient arrived from Dialysis. Nauseated, dry heaving, some phlegm white out. IV RAC saline locked. Reglan given as ordered. Blood sugar 126. B/p elevated at 183/ 81, HR 69, messaged Dr Whitney Malik to inform. Oriented to room, call light, tv, menu, bed. Reviewed safety and plan of care. Bed alarm on, call light in reach. Will monitor.

## 2022-10-05 NOTE — FLOWSHEET NOTE
10/05/22 1436 10/05/22 1735   Treatment   Time On 1436  --    Time Off  --  1735   Vital Signs   /84 90/61   Temp 97.5 °F (36.4 °C) 97.4 °F (36.3 °C)   Heart Rate 96 55   Resp 14 14   Weight 138 lb 14.2 oz (63 kg) 136 lb 11 oz (62 kg)   Percent Weight Change 0 0   Treatment time: 3 HOURS   Net UF: 900 ML    Pre weight: 63 KG  Post weight: 62 KG  EDW: 63 KG    Access used: LFA AVF  Access function: GOOD WITH  ML/MIN    Medications or blood products given: NONE    Regular outpatient schedule: [facility] TTS    Summary of response to treatment:  ML. BP LOW NORM AT TIMES DURING TREATMENT. AT END OF TREATMENT NAUSEOUS AND COUGHING UP PHLEGM. MEDICATED WITH ZOFRAN 4 MG IVP. Jeffrey Clifton 90. ACCU CHECK 98. Copy of dialysis treatment record placed in chart, to be scanned into EMR.
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
## 2022-10-06 PROBLEM — R11.2 NAUSEA AND VOMITING: Status: ACTIVE | Noted: 2022-10-06

## 2022-10-06 LAB
ANION GAP SERPL CALCULATED.3IONS-SCNC: 20 MMOL/L (ref 3–16)
BASOPHILS ABSOLUTE: 0.1 K/UL (ref 0–0.2)
BASOPHILS RELATIVE PERCENT: 1.2 %
BUN BLDV-MCNC: 75 MG/DL (ref 7–20)
CALCIUM SERPL-MCNC: 9 MG/DL (ref 8.3–10.6)
CHLORIDE BLD-SCNC: 95 MMOL/L (ref 99–110)
CO2: 23 MMOL/L (ref 21–32)
CREAT SERPL-MCNC: 10.5 MG/DL (ref 0.8–1.3)
EOSINOPHILS ABSOLUTE: 0.2 K/UL (ref 0–0.6)
EOSINOPHILS RELATIVE PERCENT: 2.6 %
ESTIMATED AVERAGE GLUCOSE: 157.1 MG/DL
GFR AFRICAN AMERICAN: 6
GFR NON-AFRICAN AMERICAN: 5
GLUCOSE BLD-MCNC: 162 MG/DL (ref 70–99)
GLUCOSE BLD-MCNC: 69 MG/DL (ref 70–99)
GLUCOSE BLD-MCNC: 83 MG/DL (ref 70–99)
GLUCOSE BLD-MCNC: 84 MG/DL (ref 70–99)
HBA1C MFR BLD: 7.1 %
HBV SURFACE AB TITR SER: 35.72 MIU/ML
HCT VFR BLD CALC: 33.9 % (ref 40.5–52.5)
HEMOGLOBIN: 11.6 G/DL (ref 13.5–17.5)
HEPATITIS B SURFACE ANTIGEN INTERPRETATION: NORMAL
LYMPHOCYTES ABSOLUTE: 1.5 K/UL (ref 1–5.1)
LYMPHOCYTES RELATIVE PERCENT: 20.8 %
MCH RBC QN AUTO: 32.4 PG (ref 26–34)
MCHC RBC AUTO-ENTMCNC: 34.3 G/DL (ref 31–36)
MCV RBC AUTO: 94.5 FL (ref 80–100)
MONOCYTES ABSOLUTE: 1 K/UL (ref 0–1.3)
MONOCYTES RELATIVE PERCENT: 13.2 %
NEUTROPHILS ABSOLUTE: 4.5 K/UL (ref 1.7–7.7)
NEUTROPHILS RELATIVE PERCENT: 62.2 %
PDW BLD-RTO: 14.5 % (ref 12.4–15.4)
PERFORMED ON: ABNORMAL
PERFORMED ON: NORMAL
PERFORMED ON: NORMAL
PLATELET # BLD: 290 K/UL (ref 135–450)
PMV BLD AUTO: 7.4 FL (ref 5–10.5)
POTASSIUM REFLEX MAGNESIUM: 4.8 MMOL/L (ref 3.5–5.1)
RBC # BLD: 3.59 M/UL (ref 4.2–5.9)
SODIUM BLD-SCNC: 138 MMOL/L (ref 136–145)
WBC # BLD: 7.2 K/UL (ref 4–11)

## 2022-10-06 PROCEDURE — 6370000000 HC RX 637 (ALT 250 FOR IP): Performed by: INTERNAL MEDICINE

## 2022-10-06 PROCEDURE — 86706 HEP B SURFACE ANTIBODY: CPT

## 2022-10-06 PROCEDURE — 36415 COLL VENOUS BLD VENIPUNCTURE: CPT

## 2022-10-06 PROCEDURE — 1200000000 HC SEMI PRIVATE

## 2022-10-06 PROCEDURE — 6360000002 HC RX W HCPCS: Performed by: INTERNAL MEDICINE

## 2022-10-06 PROCEDURE — G0378 HOSPITAL OBSERVATION PER HR: HCPCS

## 2022-10-06 PROCEDURE — 87340 HEPATITIS B SURFACE AG IA: CPT

## 2022-10-06 PROCEDURE — 2580000003 HC RX 258: Performed by: INTERNAL MEDICINE

## 2022-10-06 PROCEDURE — 6360000002 HC RX W HCPCS

## 2022-10-06 PROCEDURE — 96372 THER/PROPH/DIAG INJ SC/IM: CPT

## 2022-10-06 PROCEDURE — 80048 BASIC METABOLIC PNL TOTAL CA: CPT

## 2022-10-06 PROCEDURE — 85025 COMPLETE CBC W/AUTO DIFF WBC: CPT

## 2022-10-06 PROCEDURE — 90935 HEMODIALYSIS ONE EVALUATION: CPT

## 2022-10-06 RX ORDER — PROMETHAZINE HYDROCHLORIDE 25 MG/1
25 TABLET ORAL EVERY 6 HOURS PRN
Status: DISCONTINUED | OUTPATIENT
Start: 2022-10-06 | End: 2022-10-07 | Stop reason: HOSPADM

## 2022-10-06 RX ADMIN — HEPARIN SODIUM 5000 UNITS: 5000 INJECTION INTRAVENOUS; SUBCUTANEOUS at 14:36

## 2022-10-06 RX ADMIN — METOCLOPRAMIDE HYDROCHLORIDE 5 MG: 5 INJECTION INTRAMUSCULAR; INTRAVENOUS at 05:51

## 2022-10-06 RX ADMIN — METOCLOPRAMIDE HYDROCHLORIDE 5 MG: 5 INJECTION INTRAMUSCULAR; INTRAVENOUS at 23:11

## 2022-10-06 RX ADMIN — CARVEDILOL 12.5 MG: 12.5 TABLET, FILM COATED ORAL at 17:35

## 2022-10-06 RX ADMIN — SEVELAMER CARBONATE 2400 MG: 800 TABLET, FILM COATED ORAL at 17:36

## 2022-10-06 RX ADMIN — PROMETHAZINE HYDROCHLORIDE 25 MG: 25 TABLET ORAL at 09:35

## 2022-10-06 RX ADMIN — SODIUM CHLORIDE, PRESERVATIVE FREE 10 ML: 5 INJECTION INTRAVENOUS at 08:00

## 2022-10-06 RX ADMIN — FUROSEMIDE 40 MG: 40 TABLET ORAL at 11:58

## 2022-10-06 RX ADMIN — SERTRALINE HYDROCHLORIDE 50 MG: 50 TABLET ORAL at 11:58

## 2022-10-06 RX ADMIN — ATORVASTATIN CALCIUM 40 MG: 40 TABLET, FILM COATED ORAL at 11:58

## 2022-10-06 RX ADMIN — METOCLOPRAMIDE HYDROCHLORIDE 5 MG: 5 INJECTION INTRAMUSCULAR; INTRAVENOUS at 17:37

## 2022-10-06 RX ADMIN — ASPIRIN 81 MG: 81 TABLET, COATED ORAL at 11:58

## 2022-10-06 RX ADMIN — METOCLOPRAMIDE HYDROCHLORIDE 5 MG: 5 INJECTION INTRAMUSCULAR; INTRAVENOUS at 11:59

## 2022-10-06 RX ADMIN — HEPARIN SODIUM 5000 UNITS: 5000 INJECTION INTRAVENOUS; SUBCUTANEOUS at 05:51

## 2022-10-06 RX ADMIN — HEPARIN SODIUM 5000 UNITS: 5000 INJECTION INTRAVENOUS; SUBCUTANEOUS at 21:17

## 2022-10-06 RX ADMIN — SODIUM CHLORIDE, PRESERVATIVE FREE 10 ML: 5 INJECTION INTRAVENOUS at 23:12

## 2022-10-06 RX ADMIN — INSULIN GLARGINE 6 UNITS: 100 INJECTION, SOLUTION SUBCUTANEOUS at 21:17

## 2022-10-06 RX ADMIN — CLOPIDOGREL BISULFATE 75 MG: 75 TABLET ORAL at 11:58

## 2022-10-06 RX ADMIN — TRAZODONE HYDROCHLORIDE 50 MG: 50 TABLET ORAL at 21:17

## 2022-10-06 NOTE — PROGRESS NOTES
Nephrology Consult Note                                                                                                                                                                                                                                                                                                                                                               Office : [phone]     Fax :[phone]              Patient's Name: Jennifer Quiroz      Reason for Consult:  ESRD   Requesting Physician:  [facility]      Pt seen on HD   Jack well   No SOB   No CP    Past Medical History:   Diagnosis Date    Chronic kidney disease     Diabetes mellitus ([facility]     Hyperlipidemia     Hypertension        Past Surgical History:   Procedure Laterality Date    CARDIAC SURGERY      Heart valve replacement        History reviewed. No pertinent family history. reports that he has been smoking. He has been smoking an average of .5 packs per day. He has never used smokeless tobacco. He reports that he does not currently use alcohol. He reports current drug use. Drug: Marijuana Estrada Radon).     Allergies:  Naproxen    Current Medications:    promethazine (PHENERGAN) tablet 25 mg, Q6H PRN  dextrose 10 % infusion, Continuous PRN  aspirin EC tablet 81 mg, Daily  atorvastatin (LIPITOR) tablet 40 mg, Daily  carvedilol (COREG) tablet 12.5 mg, BID WC  clopidogrel (PLAVIX) tablet 75 mg, Daily  furosemide (LASIX) tablet 40 mg, Daily  sertraline (ZOLOFT) tablet 50 mg, Daily  sevelamer (RENVELA) tablet 2,400 mg, TID WC  traZODone (DESYREL) tablet 50 mg, Nightly  sodium chloride flush 0.9 % injection 5-40 mL, 2 times per day  sodium chloride flush 0.9 % injection 5-40 mL, PRN  0.9 % sodium chloride infusion, PRN  heparin (porcine) injection 5,000 Units, 3 times per day  acetaminophen (TYLENOL) tablet 650 mg, Q6H PRN   Or  acetaminophen (TYLENOL) suppository 650 mg, Q6H PRN  glucose chewable tablet 16 g, PRN  dextrose bolus 10% 125 mL, PRN   Or  dextrose bolus 10% 250 mL, PRN  glucagon (rDNA) injection 1 mg, PRN  dextrose 10 % infusion, Continuous PRN  insulin glargine (LANTUS;BASAGLAR) injection pen 6 Units, Nightly  insulin lispro (1 Unit Dial) (HUMALOG/ADMELOG) pen 0-4 Units, TID WC  insulin lispro (1 Unit Dial) (HUMALOG/ADMELOG) pen 0-4 Units, Nightly  metoclopramide (REGLAN) injection 5 mg, Q6H      Review of Systems:   14 point ROS obtained but were negative except mentioned in HPI      Physical exam:     Vitals:  BP (!) 156/43   Pulse 70   Temp 97.6 °F (36.4 °C)   Resp 16   Ht 5' 8\" (1.727 m)   Wt 135 lb 9.3 oz (61.5 kg)   SpO2 97%   BMI 20.62 kg/m²   Constitutional:  OAA X3 NAD  Skin: no rash, turgor wnl  Heent:  eomi, mmm  Neck: no bruits or jvd noted  Cardiovascular:  S1, S2 without m/r/g  Respiratory: CTA B without w/r/r  Abdomen:  +bs, soft, nt, nd  Ext: + lower extremity edema  Psychiatric: mood and affect appropriate  Musculoskeletal:  Rom, muscular strength intact    Data:   Labs:  CBC:   Recent Labs     10/05/22  1221 10/06/22  0654   WBC 9.4 7.2   HGB 13.2* 11.6*    290       BMP:    Recent Labs     10/05/22  1221 10/05/22  1357 10/06/22  0654     --  138   K 6.5* 7.1* 4.8   CL 90*  --  95*   CO2 17*  --  23   *  --  75*   CREATININE 17.4*  --  10.5*   GLUCOSE 127*  --  69*       Ca/Mg/Phos:   Recent Labs     10/05/22  1221 10/06/22  0654   CALCIUM 10.1 9.0       Hepatic:   Recent Labs     10/05/22  1221   AST 16   ALT 13   BILITOT 0.5   ALKPHOS 92       Troponin:   Recent Labs     10/05/22  1221 10/05/22  1357   TROPONINI 0.17* 0.16*       BNP: No results for input(s): BNP in the last 72 hours. Lipids: No results for input(s): CHOL, TRIG, HDL, LDLCALC, LABVLDL in the last 72 hours. ABGs: No results for input(s): PHART, PO2ART, [id number] in the last 72 hours. INR: No results for input(s): INR in the last 72 hours.   UA:No results for input(s): Louise Medicus, [facility], [address] [facility], [address] [place] [address]

## 2022-10-06 NOTE — PROGRESS NOTES
Pt was HTN at the beginning of shift, scheduled BP medication was given with benefit. Pt denies pain and n/v during this shift. Pt NPO since midnight. Fall precaution in place, call light within reach. Uneventful night with pt.

## 2022-10-06 NOTE — PLAN OF CARE
Problem: Discharge Planning  Goal: Discharge to home or other facility with appropriate resources  Flowsheets (Taken 10/6/2022 1253)  Discharge to home or other facility with appropriate resources:   Identify barriers to discharge with patient and caregiver   Arrange for needed discharge resources and transportation as appropriate   Identify discharge learning needs (meds, wound care, etc)   Arrange for interpreters to assist at discharge as needed   Refer to discharge planning if patient needs post-hospital services based on physician order or complex needs related to functional status, cognitive ability or social support system     Problem: Chronic Conditions and Co-morbidities  Goal: Patient's chronic conditions and co-morbidity symptoms are monitored and maintained or improved  Flowsheets (Taken 10/6/2022 1253)  Care Plan - Patient's Chronic Conditions and Co-Morbidity Symptoms are Monitored and Maintained or Improved:   Update acute care plan with appropriate goals if chronic or comorbid symptoms are exacerbated and prevent overall improvement and discharge   Collaborate with multidisciplinary team to address chronic and comorbid conditions and prevent exacerbation or deterioration   Monitor and assess patient's chronic conditions and comorbid symptoms for stability, deterioration, or improvement

## 2022-10-06 NOTE — PROGRESS NOTES
Patient O&A, VS stable, BG level 85, denies any pain. Patient had dialysis in the beginning of the shift, had nausea that was controlled with phenergan. New diet order, tolerating, placed by Dr. Anay Salmeron:    ADULT DIET; Regular; 3 carb choices (45 gm/meal); Low Fat/Low Chol/High Fiber/ALEXANDER; No Added Salt (3-4 gm); Low Potassium (Less than 3000 mg/day);  Low Phosphorus (Less than 1000 mg); 60 to 80 gm; 1200 ml [[id number]]     (Order #: [id number])

## 2022-10-06 NOTE — FLOWSHEET NOTE
Treatment time: 3 hours  Net UF: 600 ml     Pre weight: 61.5 kg   Post weight: 61 kg  EDW: 63 kg     Access used: LFA AVF  Access function: Good with  ml/min     Medications or blood products given: N/A     Regular outpatient schedule: TTS     Summary of response to treatment:    10/06/22 0800 10/06/22 1100   Vital Signs   BP (!) 156/43 (!) 115/44   Temp 97.6 °F (36.4 °C) 97.7 °F (36.5 °C)   Heart Rate 70 81   Resp 16 16   Weight 135 lb 9.3 oz (61.5 kg) 134 lb 7.7 oz (61 kg)   Weight Method Bed scale  --    Percent Weight Change -0.81 -0.81   Pain Assessment   Pain Assessment None - Denies Pain None - Denies Pain   Post-Hemodialysis Assessment   Post-Treatment Procedures  --  Blood returned; Access bleeding time < 10 minutes   Machine Disinfection Process  --  Heat Disinfect;Acid/Vinegar Clean;Exterior Machine Disinfection   Rinseback Volume (ml)  --  400 ml   Blood Volume Processed (Liters)  --  69 l/min   Dialyzer Clearance  --  Moderately streaked   Duration of Treatment (minutes)  --  180 minutes   Heparin Amount Administered During Treatment (mL)  --  0 mL   Hemodialysis Intake (ml)  --  600 ml   Hemodialysis Output (ml)  --  1200 ml   NET Removed (ml)  --  600   Tolerated Treatment  --  Good   Time Off  --  1100   Copy of dialysis treatment record placed in chart, to be scanned into EMR.  Report provided to Dilshad Vanessa RN at bedside

## 2022-10-06 NOTE — PROGRESS NOTES
4 Eyes Admission Assessment     I agree as the admission nurse that 2 RN's have performed a thorough Head to Toe Skin Assessment on the patient. ALL assessment sites listed below have been assessed on admission. Areas assessed by both nurses: ***  [x]   Head, Face, and Ears   [x]   Shoulders, Back, and Chest  [x]   Arms, Elbows, and Hands   [x]   Coccyx, Sacrum, and Ischium  [x]   Legs, Feet, and Heels        Does the Patient have Skin Breakdown?   No         Cezar Prevention initiated:  No   Wound Care Orders initiated:  No      [facility] nurse consulted for Pressure Injury (Stage 3,4, Unstageable, DTI, NWPT, and Complex wounds) or Cezar score 18 or lower:  No      Nurse 1 eSignature: Electronically signed by Bart Duval RN on 10/6/22 at 3:28 AM EDT    **SHARE this note so that the co-signing nurse is able to place an eSignature**    Nurse 2 eSignature: {Esignature:[id number]}

## 2022-10-06 NOTE — PROGRESS NOTES
Progress Note    Admit Date: 10/5/2022  Day: 2  Diet: Diet NPO    CC:  N/V    Interval history:   No acute events. Pt currently resting comfortably receiving HD. Pt reports ongoing nausea, controlled at this time, but denies any further emesis. Denies any chest pain or abdominal pain. Medications:     Scheduled Meds:   aspirin  81 mg Oral Daily    atorvastatin  40 mg Oral Daily    carvedilol  12.5 mg Oral BID WC    clopidogrel  75 mg Oral Daily    furosemide  40 mg Oral Daily    sertraline  50 mg Oral Daily    sevelamer  2,400 mg Oral TID WC    traZODone  50 mg Oral Nightly    sodium chloride flush  5-40 mL IntraVENous 2 times per day    heparin (porcine)  5,000 Units SubCUTAneous 3 times per day    insulin glargine  0.1 Units/kg SubCUTAneous Nightly    insulin lispro  0-4 Units SubCUTAneous TID WC    insulin lispro  0-4 Units SubCUTAneous Nightly    metoclopramide  5 mg IntraVENous Q6H     Continuous Infusions:   dextrose      sodium chloride      dextrose       PRN Meds:promethazine, dextrose, sodium chloride flush, sodium chloride, acetaminophen **OR** acetaminophen, glucose, dextrose bolus **OR** dextrose bolus, glucagon (rDNA), dextrose    Objective:   Vitals:   T-max:  Patient Vitals for the past 8 hrs:   BP Temp Temp src Pulse Resp SpO2 Weight   10/06/22 0800 (!) 156/43 97.6 °F (36.4 °C) -- 70 16 -- 135 lb 9.3 oz (61.5 kg)   10/06/22 0319 (!) 151/83 97.7 °F (36.5 °C) Oral 75 16 97 % --     No intake or output data in the 24 hours ending 10/06/22 1111    Physical Exam  Vitals and nursing note reviewed. Constitutional:       General: He is not in acute distress. Appearance: He is normal weight. He is not ill-appearing or diaphoretic. HENT:      Mouth/Throat:      Mouth: Mucous membranes are moist.      Pharynx: Oropharynx is clear. Cardiovascular:      Rate and Rhythm: Normal rate and regular rhythm. Pulses: Normal pulses. Heart sounds: Normal heart sounds. No murmur heard.     No 6.5 on arrival. Likely 2/2 HD noncompliance.    - HD  - Nephro cx, apprec recs  - EKG w/o changes, received Ca gluconate  - Daily BMP  - Cont Lasix 40 qD    Acute AGMA  AG 32, BC 17 on arrival.   - HD  - Nephro cx  - Improving, AG 20, BC 23 today    Intractable Nausea/Vomiting  Ongoing; currently being worked up for gastroparesis  - NPO  - Zofran prn  - Reglan  - GI cx, no acute interventions at this time    Chronic Issues  -HTN: cont home Coreg  -HLD: cont home statin    Code Status: Full    FEN: NPO  PPX: SQH  DISPO: [address]  Internal Medicine Resident  PGY-1    This patient has been staffed and discussed with Carlos Alberto Sampson MD.
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
## 2022-10-07 VITALS
BODY MASS INDEX: 20.38 KG/M2 | HEIGHT: 68 IN | SYSTOLIC BLOOD PRESSURE: 149 MMHG | DIASTOLIC BLOOD PRESSURE: 66 MMHG | HEART RATE: 77 BPM | RESPIRATION RATE: 18 BRPM | WEIGHT: 134.48 LBS | TEMPERATURE: 98.2 F | OXYGEN SATURATION: 98 %

## 2022-10-07 LAB
ALBUMIN SERPL-MCNC: 4.2 G/DL (ref 3.4–5)
ANION GAP SERPL CALCULATED.3IONS-SCNC: 17 MMOL/L (ref 3–16)
ANION GAP SERPL CALCULATED.3IONS-SCNC: 22 MMOL/L (ref 3–16)
BASOPHILS ABSOLUTE: 0.1 K/UL (ref 0–0.2)
BASOPHILS RELATIVE PERCENT: 0.9 %
BUN BLDV-MCNC: 41 MG/DL (ref 7–20)
BUN BLDV-MCNC: 41 MG/DL (ref 7–20)
CALCIUM SERPL-MCNC: 9 MG/DL (ref 8.3–10.6)
CALCIUM SERPL-MCNC: 9.2 MG/DL (ref 8.3–10.6)
CHLORIDE BLD-SCNC: 95 MMOL/L (ref 99–110)
CHLORIDE BLD-SCNC: 98 MMOL/L (ref 99–110)
CO2: 19 MMOL/L (ref 21–32)
CO2: 23 MMOL/L (ref 21–32)
CREAT SERPL-MCNC: 6.9 MG/DL (ref 0.8–1.3)
CREAT SERPL-MCNC: 7.4 MG/DL (ref 0.8–1.3)
EOSINOPHILS ABSOLUTE: 0.2 K/UL (ref 0–0.6)
EOSINOPHILS RELATIVE PERCENT: 3 %
GFR AFRICAN AMERICAN: 10
GFR AFRICAN AMERICAN: 9
GFR NON-AFRICAN AMERICAN: 8
GFR NON-AFRICAN AMERICAN: 8
GLUCOSE BLD-MCNC: 250 MG/DL (ref 70–99)
GLUCOSE BLD-MCNC: 73 MG/DL (ref 70–99)
GLUCOSE BLD-MCNC: 75 MG/DL (ref 70–99)
GLUCOSE BLD-MCNC: 82 MG/DL (ref 70–99)
HCT VFR BLD CALC: 33.7 % (ref 40.5–52.5)
HEMOGLOBIN: 11.3 G/DL (ref 13.5–17.5)
LYMPHOCYTES ABSOLUTE: 1 K/UL (ref 1–5.1)
LYMPHOCYTES RELATIVE PERCENT: 13.6 %
MCH RBC QN AUTO: 32.1 PG (ref 26–34)
MCHC RBC AUTO-ENTMCNC: 33.5 G/DL (ref 31–36)
MCV RBC AUTO: 95.8 FL (ref 80–100)
MONOCYTES ABSOLUTE: 0.9 K/UL (ref 0–1.3)
MONOCYTES RELATIVE PERCENT: 12.5 %
NEUTROPHILS ABSOLUTE: 4.9 K/UL (ref 1.7–7.7)
NEUTROPHILS RELATIVE PERCENT: 70 %
PDW BLD-RTO: 14.4 % (ref 12.4–15.4)
PERFORMED ON: ABNORMAL
PERFORMED ON: NORMAL
PHOSPHORUS: 5.9 MG/DL (ref 2.5–4.9)
PLATELET # BLD: 246 K/UL (ref 135–450)
PMV BLD AUTO: 7.4 FL (ref 5–10.5)
POTASSIUM SERPL-SCNC: 4.7 MMOL/L (ref 3.5–5.1)
POTASSIUM SERPL-SCNC: 4.7 MMOL/L (ref 3.5–5.1)
RBC # BLD: 3.52 M/UL (ref 4.2–5.9)
SODIUM BLD-SCNC: 135 MMOL/L (ref 136–145)
SODIUM BLD-SCNC: 139 MMOL/L (ref 136–145)
WBC # BLD: 7 K/UL (ref 4–11)

## 2022-10-07 PROCEDURE — 97161 PT EVAL LOW COMPLEX 20 MIN: CPT

## 2022-10-07 PROCEDURE — 97165 OT EVAL LOW COMPLEX 30 MIN: CPT

## 2022-10-07 PROCEDURE — 96372 THER/PROPH/DIAG INJ SC/IM: CPT

## 2022-10-07 PROCEDURE — 97116 GAIT TRAINING THERAPY: CPT

## 2022-10-07 PROCEDURE — 80069 RENAL FUNCTION PANEL: CPT

## 2022-10-07 PROCEDURE — 97535 SELF CARE MNGMENT TRAINING: CPT

## 2022-10-07 PROCEDURE — 6370000000 HC RX 637 (ALT 250 FOR IP): Performed by: INTERNAL MEDICINE

## 2022-10-07 PROCEDURE — G0378 HOSPITAL OBSERVATION PER HR: HCPCS

## 2022-10-07 PROCEDURE — 85025 COMPLETE CBC W/AUTO DIFF WBC: CPT

## 2022-10-07 PROCEDURE — 2580000003 HC RX 258: Performed by: INTERNAL MEDICINE

## 2022-10-07 PROCEDURE — 6360000002 HC RX W HCPCS: Performed by: INTERNAL MEDICINE

## 2022-10-07 PROCEDURE — 36415 COLL VENOUS BLD VENIPUNCTURE: CPT

## 2022-10-07 PROCEDURE — 6360000002 HC RX W HCPCS

## 2022-10-07 RX ORDER — SEVELAMER CARBONATE 800 MG/1
3 TABLET, FILM COATED ORAL
Qty: 90 TABLET | Refills: 0 | Status: SHIPPED | OUTPATIENT
Start: 2022-10-07

## 2022-10-07 RX ADMIN — FUROSEMIDE 40 MG: 40 TABLET ORAL at 08:19

## 2022-10-07 RX ADMIN — CLOPIDOGREL BISULFATE 75 MG: 75 TABLET ORAL at 08:19

## 2022-10-07 RX ADMIN — CARVEDILOL 12.5 MG: 12.5 TABLET, FILM COATED ORAL at 08:19

## 2022-10-07 RX ADMIN — ATORVASTATIN CALCIUM 40 MG: 40 TABLET, FILM COATED ORAL at 08:19

## 2022-10-07 RX ADMIN — SEVELAMER CARBONATE 2400 MG: 800 TABLET, FILM COATED ORAL at 08:19

## 2022-10-07 RX ADMIN — SERTRALINE HYDROCHLORIDE 50 MG: 50 TABLET ORAL at 08:19

## 2022-10-07 RX ADMIN — ASPIRIN 81 MG: 81 TABLET, COATED ORAL at 08:19

## 2022-10-07 RX ADMIN — SODIUM CHLORIDE, PRESERVATIVE FREE 10 ML: 5 INJECTION INTRAVENOUS at 08:21

## 2022-10-07 RX ADMIN — METOCLOPRAMIDE HYDROCHLORIDE 5 MG: 5 INJECTION INTRAMUSCULAR; INTRAVENOUS at 06:22

## 2022-10-07 RX ADMIN — HEPARIN SODIUM 5000 UNITS: 5000 INJECTION INTRAVENOUS; SUBCUTANEOUS at 06:22
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
## 2022-10-07 NOTE — CARE COORDINATION
Case Management Assessment            Discharge Note                    Date / Time of Note: 10/7/2022 1:07 PM                  Discharge Note Completed by: Jinnie Kayser, RN    Patient Name: Alexandria Zamorano   YOB: 1960  Diagnosis: Malignant hypertension with ESRD (end stage renal disease) ([facility] [address] [I12.0, N18.6]  ESRD needing dialysis ([facility] [address] [N18.6, Z99.2]  Nausea and vomiting, unspecified vomiting type [R11.2]   Date / Time: 10/5/2022 11:58 AM    Current PCP: [address] [facility] [place] patient: No    Hospitalization in the last 30 days: No    Advance Directives:  Code Status: Full Code  [place] DNR form completed and on chart: Not Indicated    Financial:  Payor: Rome Lundberg / Plan: [employer] PPO / Product Type: Medicare /      Pharmacy:    [facility] [phone]  [address] [address]  Phone: [phone] Fax: [phone]      Assistance purchasing medications?: Potential Assistance Purchasing Medications: No  Assistance provided by Case Management: Community Prescription Assistance Programs    Does patient want to participate in local refill/ meds to beds program?:      Meds To [employer] Rules:  1. Can ONLY be done Monday- Friday between 8:30am-5pm  2. Prescription(s) must be in pharmacy by 3pm to be filled same day  3. Copy of patient's insurance/ prescription drug card and patient face sheet must be sent along with the prescription(s)  4. Cost of Rx cannot be added to hospital bill. If financial assistance is needed, please contact unit  or ;  or  CANNOT provide pharmacy voucher for patients co-pays  5.  Patients can then  the prescription on their way out of the hospital at discharge, or pharmacy can deliver to the bedside if staff is available. (payment due at time of pick-up or delivery - cash, check, or card accepted)     Able to afford home medications/ co-pay costs: Yes    ADLS:  Current PT AM-PAC Score:   /24  Current OT AM-PAC Score:   /24      DISCHARGE Disposition: Home- No Services Needed    LOC at discharge: Not Applicable  RAVINDER Completed: Not Indicated    Notification completed in HENS/PAS?:  Not Applicable    IMM Completed:   Not Indicated    Transportation:  Transportation PLAN for discharge:  Lyft    Mode of Transport: Private Car    Dialysis:  Dialysis patient: Yes    Dialysis Center:    [place], [place]  Address: [place], [address]  Phone: [phone]    Referrals made at [facility] for outpatient continued care:  [facility]    Additional CM Notes: Pt to return home with wife. CM will set up a Lyft when dc ready. [facility] referral made for patient. Pt arranged transport to and from dialysis with [facility]. CM made pt aware of his Alec Fontenot benefits of transportation. The Plan for Transition of Care is related to the following treatment goals of Malignant hypertension with ESRD (end stage renal disease) ([facility] [address] [I12.0, N18.6]  ESRD needing dialysis ([facility] [address] [N18.6, Z99.2]  Nausea and vomiting, unspecified vomiting type [R11.2]    The Patient and/or patient representative Salvador Link and his family were provided with a choice of provider and agrees with the discharge plan Yes    Freedom of choice list was provided with basic dialogue that supports the patient's individualized plan of care/goals and shares the quality data associated with the providers.  Yes    Care Transitions patient: Yes    Johanna Salguero RN  [facility]  Case Management Department  Ph: [phone]  Fax: [phone]

## 2022-10-07 NOTE — PROGRESS NOTES
Patient A&Ox4. VSS on RA. HD fistula LUE. Pt SBA overnight and is a little wobbly. PT/OT workup pending. All care needs addressed with no further needs at this time. Bed is locked and in lowest position with alarm on. Call light and bedside table within reach. Will continue to monitor per protocol.      Electronically signed by Jim Ramirez RN on 10/7/2022 at 8:00 AM

## 2022-10-07 NOTE — DISCHARGE INSTRUCTIONS
Thank you for visiting [facility]    Your medications have changed. You informed us you were no longer taking the following medications. Your medical chart has been updated to remove the following:  Gabapentin (Neurontin) 100mg Capsule  Insulin lispro (Humalog) 100 unit/mL three times daily before meals    Please continue taking your other medications as prescribed. Please follow up with your primary physician in 1 week. You can call the following number to arrange an appointment: [phone].

## 2022-10-08 NOTE — PROGRESS NOTES
Nephrology Consult Note                                                                                                                                                                                                                                                                                                                                                               Office : [phone]     Fax :[phone]              Patient's Name: Connie Montano      Reason for Consult:  ESRD   Requesting Physician:  Yulisa Spring [address] Direct      S/p HD yesterday    Jack well   No SOB   No CP    Past Medical History:   Diagnosis Date    Chronic kidney disease     Diabetes mellitus ([facility] [address]     Hyperlipidemia     Hypertension        Past Surgical History:   Procedure Laterality Date    CARDIAC SURGERY      Heart valve replacement        History reviewed. No pertinent family history. reports that he has been smoking. He has been smoking an average of .5 packs per day. He has never used smokeless tobacco. He reports that he does not currently use alcohol. He reports current drug use. Drug: Marijuana Siria Pleas). Allergies:  Naproxen    Current Medications:    No current facility-administered medications for this encounter.       Review of Systems:   14 point ROS obtained but were negative except mentioned in HPI      Physical exam:     Vitals:  BP (!) 149/66   Pulse 77   Temp 98.2 °F (36.8 °C) (Oral)   Resp 18   Ht 5' 8\" (1.727 m)   Wt 134 lb 7.7 oz (61 kg)   SpO2 98%   BMI 20.45 kg/m²   Constitutional:  OAA X3 NAD  Skin: no rash, turgor wnl  Heent:  eomi, mmm  Neck: no bruits or jvd noted  Cardiovascular:  S1, S2 without m/r/g  Respiratory: CTA B without w/r/r  Abdomen:  +bs, soft, nt, nd  Ext: + lower extremity edema  Psychiatric: mood and affect appropriate  Musculoskeletal:  Rom, muscular strength intact    Data:   Labs:  CBC:   Recent Labs     10/05/22  1221 10/06/22  0654 10/07/22  0807   WBC 9.4 7.2 7.0   HGB 13.2* 11.6* 11.3*    290 246       BMP:    Recent Labs     10/05/22  1221 10/05/22  1357 10/06/22  0654 10/07/22  0807     --  138 139  135*   K 6.5* 7.1* 4.8 4.7  4.7   CL 90*  --  95* 98*  95*   CO2 17*  --  23 19*  23   *  --  75* 41*  41*   CREATININE 17.4*  --  10.5* 6.9*  7.4*   GLUCOSE 127*  --  69* 73  82       Ca/Mg/Phos:   Recent Labs     10/05/22  1221 10/06/22  0654 10/07/22  0807   CALCIUM 10.1 9.0 9.2  9.0   PHOS  --   --  5.9*       Hepatic:   Recent Labs     10/05/22  1221   AST 16   ALT 13   BILITOT 0.5   ALKPHOS 92       Troponin:   Recent Labs     10/05/22  1221 10/05/22  1357   TROPONINI 0.17* 0.16*       BNP: No results for input(s): BNP in the last 72 hours. Lipids: No results for input(s): CHOL, TRIG, HDL, LDLCALC, LABVLDL in the last 72 hours. ABGs: No results for input(s): PHART, PO2ART, [id number] in the last 72 hours. INR: No results for input(s): INR in the last 72 hours. UA:No results for input(s): [place], GLUCOSEU, BILIRUBINUR, Jeanann Dials, BLOODU, PHUR, PROTEINU, UROBILINOGEN, NITRU, LEUKOCYTESUR, LABMICR, URINETYPE in the last 72 hours. Urine Microscopic: No results for input(s): LABCAST, BACTERIA, COMU, HYALCAST, WBCUA, RBCUA, EPIU in the last 72 hours. Urine Culture: No results for input(s): LABURIN in the last 72 hours. Urine Chemistry: No results for input(s): Carmen Ke, PROTEINUR, NAUR in the last 72 hours. IMAGING:  XR CHEST (2 VW)   Final Result      Clear lungs. Prominent skinfold overlying the right lateral lung base. Stable cardiomediastinal silhouette status post valve replacement. Assessment/Plan   ESRD      2. HTN    3. Anemia    4. Acid- base/ Electrolyte imbalance     5.  Hyperkalemia     Plan   - HD in am   - UF as kitty   - Low K bath   - renal diet   - home BP meds   - Nausea control  - Anemia management   - MBD management                   Thank you for allowing us to participate in care of [facility] Whitney Javed MD  Feel free to contact me   [facility] [address]  Office : [phone]  Fax :[phone]

## 2022-10-10 ENCOUNTER — CARE COORDINATION (OUTPATIENT)
Dept: CASE MANAGEMENT | Age: 62
End: 2022-10-10

## 2022-10-10 NOTE — CARE COORDINATION
[facility] Care Transitions Initial Follow Up Call    Call within 2 business days of discharge: Yes    Patient: Rae Serrano Patient : 1960   MRN: <[id number]>  Reason for Admission: ESRD needing dialysis  Discharge Date: 10/7/22 RARS: Readmission Risk Score: 20.6      Last Discharge  Street       Date Complaint Diagnosis Description Type Department Provider    10/5/22 Dizziness; Nausea ESRD needing dialysis ([facility] [address] . .. ED to Hosp-Admission (Discharged) (ADMITTED) Josesito Membreno MD; Serafin Horn. .. Attempted to contact patient for initial follow up transition call. Patient was unable to speak to me at this time. He stated he would call back as soon as he could. Contact information showed up on his caller id. Will try again if call not received. Care Transitions 24 Hour Call    Care Transitions Interventions         Follow Up  No future appointments.   Sharon Chappell, EMIN

## 2022-10-11 ENCOUNTER — CARE COORDINATION (OUTPATIENT)
Dept: CASE MANAGEMENT | Age: 62
End: 2022-10-11

## 2022-11-12 NOTE — ED NOTES
released body.  primary care clinic sts they will not sign death certificate at this time. sts \"your doctor will take care of it. \" ED physician not able to sign death certificate.  also contacted  and was told the same but that we should also contact pts cardiologist/ nephrologist to sign death certificate. On call NP for cardiology group contacted. sts that they will \" do some research and call back. \"      Nesha Clark RN  11/12/22 3485

## 2022-11-12 NOTE — ED PROVIDER NOTES
[address] [facility]          ATTENDING PHYSICIAN NOTE       Date of evaluation: 11/12/2022    Chief Complaint     Cardiac Arrest      History of Present Illness     Rachelle Garcia is a 63 y.o. male who presents to the emergency department by EMS in cardiopulmonary arrest.  According to EMS, they were dispatched at [phone]  this morning, and arrived to find the patient unresponsive, apneic, pulseless. Initial rhythm was a bradycardic PEA in the 30s. Airway was secured with an I-gel, access was obtained with an IO, and chest compressions were initiated, then continued with the Garlin Milling. Paramedics note that after about 20 minutes of resuscitation, they briefly had a pulse with a heart rate in the 60s, but in the process of transferring the patient to the EMS stretcher, pulse was lost, and he has remained in PEA since that time. The patient received a total of 6 doses of epinephrine, as well as some IV fluids through the IO line. By the time of arrival to the emergency department, the patient's downtime had been approximately 55 minutes. Further history was later obtained from the patient's family, who presented to the emergency department. The patient's wife noted that she had been getting him up and ready for dialysis this morning, when he collapsed to the ground and became unresponsive. He had reportedly not been feeling well and complaining of some shortness of breath over the last couple of days. Other family members note that he had had a marked decline in his health over the past several months. Review of Systems     Review of Systems   Unable to perform ROS: Acuity of condition     Past Medical, Surgical, Family, and Social History     He has a past medical history of Chronic kidney disease, Diabetes mellitus ([address] Hyperlipidemia, and Hypertension. He has a past surgical history that includes Cardiac surgery. His family history is not on file.   He reports that he has compressions. Respiratory: Patient has no spontaneous respirations. He has good air movement and chest wall movement with bagging through the I-gel. Abdomen: Soft and flat. No masses or hepatosplenomegaly. Skin: No rashes or ecchymoses, lacerations or abrasions. Neuro: Patient is GCS 1 T1, with no elicitable neurologic function. Extremities: Slender, cool. No edema noted. No deformity noted. Psych: Not assessable. Diagnostic Results       RADIOLOGY:  No orders to display       LABS:   No results found for this visit on 11/12/22. RECENT VITALS:   ,  ,  ,  ,       Procedures         ED Course     Nursing Notes, Past Medical Hx, Past Surgical Hx, Social Hx, Allergies, and Family Hx were reviewed. The patient was given the following medications:  Orders Placed This Encounter   Medications    sodium bicarbonate 8.4 % injection    calcium chloride 10 % injection    EPINEPHrine 1 MG/10ML injection       CONSULTS:  None    MEDICAL DECISION MAKING / ASSESSMENT / Bonnie Adrien is a 63 y.o. male with a history of end-stage renal disease on hemodialysis, as well as CHF and other medical comorbidities, who presents to the emergency department by EMS undergoing active compressions due to cardiac arrest.  The patient's family is able to describe that he has had a decline in his health over the past several months, and had been complaining of not feeling well and being short of breath for the last couple of days. He was getting ready to leave for dialysis, when he collapsed and became unresponsive, and was found pulseless and apneic by EMS. The patient never had a shockable rhythm. He was managed for PEA arrest appropriately by EMS with compressions and epinephrine. He briefly had return of spontaneous circulation, with loss of pulses during transfer to the [facility]. By the time of arrival to the emergency department, the patient's total downtime was around 55 minutes.   He had a supraglottic airway in place, which was bagging well, with good end-tidal waveform, good chest wall movement, and good air movement on auscultation. This airway was left in place during resuscitation. Compressions were continued by the Loretta Siemens device. IV access was obtained by nursing staff. During placement of IV access, initial round of resuscitation was given through the left tibial IO. Given the patient's history of end-stage renal disease on hemodialysis, he was given calcium and bicarbonate, together with his initial dose of epinephrine in this emergency department. Initial pulse check was delayed after administration of these medications until preparations were in place to place pads and transfer Loretta Siemens devices, which was performed with minimal interruption and CPR. Once on our pads and Drew device, the patient was resuscitated through several rounds of ACLS, and continued in PEA throughout. By final pulse check, PEA was slow and irregular, nearing asystole. At that point, the patient's total downtime was an hour and 10 minutes. Further resuscitation was felt to be futile by the team.  Time of death was called at [phone]. Critical Care:  Due to the immediate potential for life-threatening deterioration due to cardiac arrest, I spent a total of approximately 40 minutes providing critical care. This time excludes time spent performing procedures but includes time spent on direct patient care, history retrieval, review of the chart, and discussions with patient, family, and consultant(s). Clinical Impression     1. Cardiac arrest [facility])        Disposition     PATIENT REFERRED TO:  No follow-up provider specified. DISCHARGE MEDICATIONS:  New Prescriptions    No medications on file       DISPOSITION     (Please note that portions of this note were completed with voice recognition software.   Efforts were made to edit the dictations but occasionally words are mis-transcribed.)     Remy Holt Lalo Starr MD  11/12/22 [id number]
